# Patient Record
Sex: MALE | Race: WHITE | NOT HISPANIC OR LATINO | Employment: OTHER | URBAN - METROPOLITAN AREA
[De-identification: names, ages, dates, MRNs, and addresses within clinical notes are randomized per-mention and may not be internally consistent; named-entity substitution may affect disease eponyms.]

---

## 2023-03-09 ENCOUNTER — TELEPHONE (OUTPATIENT)
Dept: PRIMARY CARE | Facility: CLINIC | Age: 63
End: 2023-03-09

## 2023-03-09 RX ORDER — METOPROLOL TARTRATE 25 MG/1
25 TABLET, FILM COATED ORAL 2 TIMES DAILY
COMMUNITY
End: 2023-03-13 | Stop reason: SDUPTHER

## 2023-03-09 NOTE — TELEPHONE ENCOUNTER
Jayy calling because he needs refill of Metropolol Eartrate 25mg sent to [facility] pharmacy on [facility] in [place]. He has one pill left

## 2023-03-13 ENCOUNTER — TELEPHONE (OUTPATIENT)
Dept: PRIMARY CARE | Facility: CLINIC | Age: 63
End: 2023-03-13

## 2023-03-13 DIAGNOSIS — I10 PRIMARY HYPERTENSION: Primary | ICD-10-CM

## 2023-03-13 RX ORDER — ASPIRIN 325 MG
325 TABLET ORAL DAILY
COMMUNITY
End: 2023-09-12 | Stop reason: SDUPTHER

## 2023-03-13 RX ORDER — BUDESONIDE AND FORMOTEROL FUMARATE DIHYDRATE 160; 4.5 UG/1; UG/1
2 AEROSOL RESPIRATORY (INHALATION) 2 TIMES DAILY
COMMUNITY
Start: 2022-06-07 | End: 2023-11-07 | Stop reason: SDUPTHER

## 2023-03-13 RX ORDER — FERROUS SULFATE 325(65) MG
TABLET ORAL
COMMUNITY
Start: 2021-04-08 | End: 2023-12-14 | Stop reason: WASHOUT

## 2023-03-13 RX ORDER — ALBUTEROL SULFATE 90 UG/1
2 AEROSOL, METERED RESPIRATORY (INHALATION) EVERY 4 HOURS PRN
COMMUNITY
Start: 2021-10-26

## 2023-03-13 RX ORDER — INSULIN GLARGINE-YFGN 100 [IU]/ML
72 INJECTION, SOLUTION SUBCUTANEOUS DAILY
COMMUNITY
Start: 2022-03-25 | End: 2023-03-29 | Stop reason: SDUPTHER

## 2023-03-13 RX ORDER — EMPAGLIFLOZIN 25 MG/1
1 TABLET, FILM COATED ORAL DAILY
COMMUNITY
Start: 2023-02-22 | End: 2023-10-25 | Stop reason: SDUPTHER

## 2023-03-13 RX ORDER — CYCLOBENZAPRINE HCL 5 MG
1 TABLET ORAL
COMMUNITY
Start: 2022-11-09 | End: 2024-01-18 | Stop reason: WASHOUT

## 2023-03-13 RX ORDER — INSULIN LISPRO 100 [IU]/ML
INJECTION, SOLUTION INTRAVENOUS; SUBCUTANEOUS
COMMUNITY
Start: 2021-04-07 | End: 2024-01-31 | Stop reason: SDUPTHER

## 2023-03-13 RX ORDER — MONTELUKAST SODIUM 4 MG/1
2 TABLET, CHEWABLE ORAL DAILY
COMMUNITY
End: 2024-04-16 | Stop reason: WASHOUT

## 2023-03-13 RX ORDER — METOPROLOL TARTRATE 25 MG/1
25 TABLET, FILM COATED ORAL 2 TIMES DAILY
Qty: 90 TABLET | Refills: 1 | Status: SHIPPED | OUTPATIENT
Start: 2023-03-13 | End: 2023-06-07 | Stop reason: SDUPTHER

## 2023-03-13 RX ORDER — FUROSEMIDE 20 MG/1
2 TABLET ORAL 2 TIMES DAILY
COMMUNITY
End: 2023-12-14 | Stop reason: WASHOUT

## 2023-03-13 RX ORDER — MELATONIN 10 MG
10 CAPSULE ORAL NIGHTLY
COMMUNITY
End: 2023-09-12 | Stop reason: SDUPTHER

## 2023-03-13 RX ORDER — FLASH GLUCOSE SENSOR
KIT MISCELLANEOUS
COMMUNITY
Start: 2023-02-25 | End: 2023-11-16 | Stop reason: WASHOUT

## 2023-03-13 RX ORDER — CEPHALEXIN 250 MG/1
1 CAPSULE ORAL 2 TIMES DAILY
COMMUNITY
Start: 2022-04-19 | End: 2023-12-14 | Stop reason: WASHOUT

## 2023-03-13 RX ORDER — BLOOD-GLUCOSE METER
KIT MISCELLANEOUS
COMMUNITY
Start: 2016-11-14 | End: 2023-11-16 | Stop reason: WASHOUT

## 2023-03-13 RX ORDER — DOCUSATE SODIUM 100 MG/1
CAPSULE, LIQUID FILLED ORAL
COMMUNITY
Start: 2020-05-12 | End: 2023-09-12 | Stop reason: SDUPTHER

## 2023-03-13 RX ORDER — PREGABALIN 150 MG/1
150 CAPSULE ORAL 3 TIMES DAILY
COMMUNITY
End: 2023-10-18 | Stop reason: SDUPTHER

## 2023-03-21 ENCOUNTER — TELEMEDICINE (OUTPATIENT)
Dept: PHARMACY | Facility: HOSPITAL | Age: 63
End: 2023-03-21

## 2023-03-21 DIAGNOSIS — E11.65 TYPE 2 DIABETES MELLITUS WITH HYPERGLYCEMIA, WITH LONG-TERM CURRENT USE OF INSULIN (MULTI): Primary | ICD-10-CM

## 2023-03-21 DIAGNOSIS — Z79.4 TYPE 2 DIABETES MELLITUS WITH HYPERGLYCEMIA, WITH LONG-TERM CURRENT USE OF INSULIN (MULTI): Primary | ICD-10-CM

## 2023-03-21 RX ORDER — BLOOD-GLUCOSE SENSOR
EACH MISCELLANEOUS
Qty: 2 EACH | Refills: 11 | Status: SHIPPED | OUTPATIENT
Start: 2023-03-21

## 2023-03-21 NOTE — PROGRESS NOTES
"Pharmacy Clinic Note    Jayy Coles is a 62 y.o. male was referred to Clinical Pharmacy Team for diabetes management. The patient was referred for their Diabetes.    Referring Provider: LINSEY Campoverde    Subjective   Allergies   Allergen Reactions   • Terbinafine Unknown   • Penicillins Rash     \"red and hot\"       [facility] #[id number] [address] & [address] [address]  Phone: [phone] Fax: [phone]      Diabetes  He presents for his follow-up diabetic visit. He has type 2 diabetes mellitus. His disease course has been fluctuating. There are no hypoglycemic associated symptoms. There are no diabetic associated symptoms. Risk factors for coronary artery disease include diabetes mellitus, male sex, obesity and hypertension. Current diabetic treatment includes intensive insulin program and oral agent (monotherapy). His weight is increasing steadily. Exercise: Increased temporarily d/t PT appointments, dut now decreased d/t injury. His home blood glucose trend is fluctuating dramatically. His overall blood glucose range is >200 mg/dl.       Objective     There were no vitals taken for this visit.     LAB  Lab Results   Component Value Date    BILITOT 0.5 10/06/2022    CALCIUM 9.3 11/14/2022    CO2 29 11/14/2022    CL 98 11/14/2022    CREATININE 1.02 11/14/2022    GLUCOSE 315 (H) 11/14/2022    ALKPHOS 95 10/06/2022    K 4.1 11/14/2022    PROT 6.9 10/06/2022     (L) 11/14/2022    AST 28 10/06/2022    ALT 45 10/06/2022    BUN 18 11/14/2022    ANIONGAP 11 11/14/2022    MG 2.28 11/14/2022    PHOS 3.2 10/09/2021     (H) 10/02/2021    ALBUMIN 4.1 10/06/2022     Lab Results   Component Value Date    TRIG 382 (H) 10/06/2022    CHOL 192 10/06/2022    HDL 38.9 (A) 10/06/2022     Lab Results   Component Value Date    HGBA1C 9.1 (A) 10/01/2021       Current Outpatient Medications on File Prior to Visit   Medication " Sig Dispense Refill   • Advair Diskus 100-50 mcg/dose diskus inhaler Inhale 1 puff  in the morning and 1 puff before bedtime.     • albuterol 90 mcg/actuation inhaler 2 puffs every 4 hours if needed for shortness of breath or wheezing (cough).     • aspirin 325 mg tablet Take 1 tablet (325 mg) by mouth once daily.     • colestipol (Colestid) 1 gram tablet Take 2 tablets (2 g) by mouth in the morning and 2 tablets (2 g) before bedtime.     • cyclobenzaprine (Flexeril) 5 mg tablet Take 1 tablet (5 mg) by mouth every 6 hours during the day.     • docusate sodium (DOK) 100 mg capsule Take by mouth.     • ferrous sulfate 325 (65 Fe) MG tablet Take by mouth.     • FreeStyle Casandra 14 Day Sensor kit USE AS DIRECTED ON PACKAGE     • FreeStyle Lite Strips strip Test 4 times daily     • furosemide (Lasix) 20 mg tablet Take 2 tablets (40 mg) by mouth in the morning and 2 tablets (40 mg) before bedtime.     • HumaLOG KwikPen Insulin 100 unit/mL injection Inject under the skin.     • Jardiance 25 mg Take 1 tablet (25 mg) by mouth once daily.     • melatonin 10 mg capsule Take 1 capsule (10 mg) by mouth once daily at bedtime.     • metoprolol tartrate (Lopressor) 25 mg tablet Take 1 tablet (25 mg) by mouth in the morning and 1 tablet (25 mg) before bedtime. 90 tablet 1   • multivitamin capsule Take 1 capsule by mouth once daily.     • pregabalin (Lyrica) 150 mg capsule Take 1 capsule (150 mg) by mouth in the morning and 1 capsule (150 mg) in the evening and 1 capsule (150 mg) before bedtime.     • Semglee,insulin glarg-yfgn,Pen 100 unit/mL (3 mL) insulin pen Inject under the skin.     • Symbicort 160-4.5 mcg/actuation inhaler Inhale 2 puffs  in the morning and 2 puffs before bedtime.       No current facility-administered medications on file prior to visit.        HISTORICAL PHARMACOTHERAPY  - Metformin: cannot take d/t intolerance    SMBG (if applicable):  Date Morning Lunch Dinner Bedtime   5-Mar [phone]    6-Mar 896 357    7-Mar 206 230  247   8-Mar 314 251 263    9-Mar 317 253 218    10-Mar 241 247  363   11-Mar 289 254 241    12-Mar 272 257 360    13-Mar 215      14-Mar 263 232 189    15-Mar 198   201   16-Mar   343 start steroid burst   17-Mar 363 326 407    18-Mar 249 218 428    19-Mar  331 341    20-Mar 309 236 high 478       DRUG INTERACTIONS  - No significant drug-drug interactions exist that require change in therapy  _______________________________________________________________________  PATIENT EDUCATION/GOALS    1. Discussed disease specific goals:   - Fasting B - 130 mg/dL  - Postprandial BG: less than 180 mg/dL  - A1c: less than 7%    2. Patient BG still elevated, but was declining slightly post Semglee increase to 66 units until patient was placed on two steroid bursts due to an Achilles injury and breathing problems. Given pertsistantly elevated BG, we will increase Semglee to 72 units daily at this time and consider increasing SS insulin at next visit in one week if BG still uncontrolled. Insulin changes subject to changes in steroid therapy.    3. [facility] Casandra [facility] Casandra 14 day sensors which are being discontinued in the next 1-2 years.  _______________________________________________________________________    Assessment/Plan   Problem List Items Addressed This Visit    None  Visit Diagnoses       Type 2 diabetes mellitus with hyperglycemia, with long-term current use of insulin (CMS/[facility])    -  Primary    Relevant Medications    blood-glucose sensor (FreeStyle Casandra 3 Sensor) device           Plan:  Increase Semglee to 72 units daily.  Continue Humalog SS. May increase at next visit.  Continue Jardiance 25 mg.    Clinical Pharmacist follow-up: 1 week, then monthly.    Jose F Oneill, PharmD     Verbal consent to manage patient's drug therapy was obtained from [the patient and/or an individual authorized to act on behalf of a patient]. They were informed they may decline to  participate or withdraw from participation in pharmacy services at any time.

## 2023-03-21 NOTE — Clinical Note
Good afternoon. TJ was starting to improve with the aggressive insulin therapy, but has been recently elevated due to two steroid tapers. Even without steroids he was still above goal, but we are proceeding with Increasing his Semglee from 66 BID to 72 BID.  I will follow up in 1 week once his steroid burst is done and adjust his sliding scale if he still remains high.

## 2023-03-29 RX ORDER — INSULIN GLARGINE-YFGN 100 [IU]/ML
72 INJECTION, SOLUTION SUBCUTANEOUS DAILY
Qty: 15 ML | Refills: 11 | Status: SHIPPED | OUTPATIENT
Start: 2023-03-29 | End: 2023-04-26 | Stop reason: SDUPTHER

## 2023-03-29 ASSESSMENT — ENCOUNTER SYMPTOMS: DIABETIC ASSOCIATED SYMPTOMS: 0

## 2023-04-07 ENCOUNTER — TELEPHONE (OUTPATIENT)
Dept: PRIMARY CARE | Facility: CLINIC | Age: 63
End: 2023-04-07
Payer: COMMERCIAL

## 2023-04-07 DIAGNOSIS — Z79.2 NEED FOR PROPHYLACTIC ANTIBIOTIC: Primary | ICD-10-CM

## 2023-04-07 RX ORDER — DOXYCYCLINE 100 MG/1
100 CAPSULE ORAL 2 TIMES DAILY
Qty: 14 CAPSULE | Refills: 0 | Status: SHIPPED | OUTPATIENT
Start: 2023-04-07 | End: 2023-04-14

## 2023-04-26 ENCOUNTER — TELEMEDICINE (OUTPATIENT)
Dept: PHARMACY | Facility: HOSPITAL | Age: 63
End: 2023-04-26
Payer: COMMERCIAL

## 2023-04-26 DIAGNOSIS — E11.65 TYPE 2 DIABETES MELLITUS WITH HYPERGLYCEMIA, WITH LONG-TERM CURRENT USE OF INSULIN (MULTI): Primary | ICD-10-CM

## 2023-04-26 DIAGNOSIS — Z79.4 TYPE 2 DIABETES MELLITUS WITH HYPERGLYCEMIA, WITH LONG-TERM CURRENT USE OF INSULIN (MULTI): Primary | ICD-10-CM

## 2023-04-26 RX ORDER — INSULIN GLARGINE-YFGN 100 [IU]/ML
80 INJECTION, SOLUTION SUBCUTANEOUS DAILY
Qty: 30 ML | Refills: 11 | Status: SHIPPED | OUTPATIENT
Start: 2023-04-26 | End: 2023-04-27 | Stop reason: SDUPTHER

## 2023-04-26 ASSESSMENT — ENCOUNTER SYMPTOMS: DIABETIC ASSOCIATED SYMPTOMS: 0

## 2023-04-26 NOTE — Clinical Note
Due to lack of response, we are maxing out TJ's daily insulin (per one injection) Semglee at 80 units.; I suspect that this will still be insufficient, so at next visit we plan to increase his sliding scale regimen as well.

## 2023-04-27 RX ORDER — INSULIN GLARGINE 100 [IU]/ML
80 INJECTION, SOLUTION SUBCUTANEOUS DAILY
Qty: 30 ML | Refills: 12 | Status: SHIPPED | OUTPATIENT
Start: 2023-04-27 | End: 2023-05-16 | Stop reason: SDUPTHER

## 2023-05-01 ENCOUNTER — APPOINTMENT (OUTPATIENT)
Dept: PHARMACY | Facility: HOSPITAL | Age: 63
End: 2023-05-01
Payer: COMMERCIAL

## 2023-05-08 NOTE — PROGRESS NOTES
"Subjective   Chief Complaint   Patient presents with    Follow-up     Jayy is here today for routine six month F/U and A1C check.        Patient ID: Jayy Coles is a 62 y.o. male who presents for Follow-up (Jayy is here today for routine six month F/U and A1C check. )    HPI  63 yo male presents for 6 mo f/u   Pt is accompanied by his wife Arianna today      PMH: Hx Afib, EDEMA, HFpEF, DVT RLE, T2DM, ASTHMA, GERD, HLD, COLON CA, LEFT TKR w/ mult revisions 2/2 infections    Pt was hospitalized, on ventilator at [facility] in October 2021 w/Covid   Pt still wearing O2 at 3 lpm when active---> seeing Dr. Vásquez, Pulm  Pt had CT chest on 12/29/21 per Dr. Vásquez----> resolving Covid Pneumonia, + Pulm HTN  had PFTs in March 2022  states he is SOB w/exertion  not using Symbicort  Pt has never done sleep study      Pt reports that he has been feeling ok, really struggling with \"water retention\"   still gets SOB at times, wearing O2 at home, not portable (wants portable tank)  Did Pulm Rehab in Dec 2022  Had trouble walking > 4 min on treadmill  Went on Recumbent bike, was doing well---->until he strained his hamstrings  Found to have bone spur @ RIGHT heel   Not currently exercising d/t pain      Pt has been complaining @ \"bulge in abd\"  had CT abd/pelvis Oct 2022---> saw Dr. Manzano in Nov 2022    Pt saw new pain mgmt MD---> MD is requesting to implant a wire stimulator from LEFT knee to ankle to help with pain   Pt is seeing Dr. Mosquera next week to discuss this   He is currently taking Oxycodone 3x/day      #HFpEF/CAD/Hx AFIB/EDEMA   Seeing cardiology, Dr. Thomson; LOV April 2023   Increased Lasix to 3 tabs in am and 2 tabs in pm (total 100 mg/day)  Pt has 2-3+ B/L ankle edema   Hasn't noticed much of a change      #T2DM  chronic, uncontrolled--->  [facility] assisting, next VV 5/16/23  dx'd 1995   Was seeing Dr. Booker until he retired last year   A1C= 9.5 % Oct 2022  Meds: Taking Lantus 80 u qAM and SSI Humalog w/meals " "(25-35u)  last eye exam:   neuropathy: improved since increase w/Lyrica     #ASTHMA  triggers: fall weather  Albuterol PRN; uses mostly in the fall  Rx: Symbicort (not using daily)   nonsmoker     #GERD  no meds  triggers: spicy foods      #HLD  taking Colestipol  lipid panel Oct 2022-----> 192/77/382     #HM  FBW: UTD     Review of Systems  All 13 systems were reviewed and are within normal limits except positive and pertinent negative responses which are noted below or in HPI.      Objective   /77   Pulse 68   Ht 1.753 m (5' 9\")   Wt (!) 152 kg (335 lb)   BMI 49.47 kg/m²        Physical Exam  Vitals reviewed.   Constitutional:       Appearance: He is obese.   Pulmonary:      Effort: Pulmonary effort is normal.   Musculoskeletal:      Right lower le+ Pitting Edema present.      Left lower le+ Pitting Edema present.   Skin:     General: Skin is warm and dry.   Neurological:      Mental Status: He is alert.   Psychiatric:         Mood and Affect: Mood normal.         Assessment/Plan   Problem List Items Addressed This Visit          Respiratory    Asthma    On home oxygen therapy       Circulatory    Atherosclerosis of aorta (CMS/HCC)    Heart failure with preserved left ventricular function (HFpEF) (CMS/HCC)       Digestive    GERD (gastroesophageal reflux disease)       Endocrine/Metabolic    Type 2 diabetes mellitus with hyperglycemia, with long-term current use of insulin (CMS/[facility])    Relevant Orders    POCT glycosylated hemoglobin (Hb A1C) manually resulted (Completed)    Referral to Endocrinology       Other    Edema     Other Visit Diagnoses       Daytime somnolence    -  Primary    Relevant Orders    In-Center Sleep Study (Non-Sleep Provider Only)            "

## 2023-05-09 ENCOUNTER — OFFICE VISIT (OUTPATIENT)
Dept: PRIMARY CARE | Facility: CLINIC | Age: 63
End: 2023-05-09
Payer: COMMERCIAL

## 2023-05-09 VITALS
WEIGHT: 315 LBS | HEART RATE: 68 BPM | BODY MASS INDEX: 46.65 KG/M2 | HEIGHT: 69 IN | DIASTOLIC BLOOD PRESSURE: 77 MMHG | SYSTOLIC BLOOD PRESSURE: 134 MMHG

## 2023-05-09 DIAGNOSIS — Z79.4 TYPE 2 DIABETES MELLITUS WITH HYPERGLYCEMIA, WITH LONG-TERM CURRENT USE OF INSULIN (MULTI): ICD-10-CM

## 2023-05-09 DIAGNOSIS — Z99.81 ON HOME OXYGEN THERAPY: ICD-10-CM

## 2023-05-09 DIAGNOSIS — R40.0 DAYTIME SOMNOLENCE: Primary | ICD-10-CM

## 2023-05-09 DIAGNOSIS — J45.20 MILD INTERMITTENT ASTHMA, UNSPECIFIED WHETHER COMPLICATED (HHS-HCC): ICD-10-CM

## 2023-05-09 DIAGNOSIS — I70.0 ATHEROSCLEROSIS OF AORTA (CMS-HCC): ICD-10-CM

## 2023-05-09 DIAGNOSIS — R60.0 LOCALIZED EDEMA: ICD-10-CM

## 2023-05-09 DIAGNOSIS — I50.30 HEART FAILURE WITH PRESERVED LEFT VENTRICULAR FUNCTION (HFPEF) (MULTI): ICD-10-CM

## 2023-05-09 DIAGNOSIS — E11.65 TYPE 2 DIABETES MELLITUS WITH HYPERGLYCEMIA, WITH LONG-TERM CURRENT USE OF INSULIN (MULTI): ICD-10-CM

## 2023-05-09 DIAGNOSIS — K21.9 GASTROESOPHAGEAL REFLUX DISEASE WITHOUT ESOPHAGITIS: ICD-10-CM

## 2023-05-09 PROBLEM — E11.9 DIABETES TYPE 2, CONTROLLED (MULTI): Status: ACTIVE | Noted: 2023-05-09

## 2023-05-09 PROBLEM — I71.21 ASCENDING AORTIC ANEURYSM (CMS-HCC): Status: ACTIVE | Noted: 2023-05-09

## 2023-05-09 PROBLEM — Z86.79 HISTORY OF ATRIAL FIBRILLATION: Status: ACTIVE | Noted: 2023-05-09

## 2023-05-09 PROBLEM — K43.9 LATERAL VENTRAL HERNIA: Status: ACTIVE | Noted: 2023-05-09

## 2023-05-09 PROBLEM — M17.0 OSTEOARTHRITIS OF KNEES, BILATERAL: Status: ACTIVE | Noted: 2023-05-09

## 2023-05-09 PROBLEM — D64.9 ANEMIA: Status: ACTIVE | Noted: 2023-05-09

## 2023-05-09 PROBLEM — Z96.652 STATUS POST REVISION OF TOTAL REPLACEMENT OF LEFT KNEE: Status: ACTIVE | Noted: 2023-05-09

## 2023-05-09 PROBLEM — J45.909 ASTHMA (HHS-HCC): Status: ACTIVE | Noted: 2023-05-09

## 2023-05-09 PROBLEM — E78.5 HYPERLIPIDEMIA: Status: ACTIVE | Noted: 2023-05-09

## 2023-05-09 PROBLEM — I25.10 CORONARY ARTERY CALCIFICATION SEEN ON CT SCAN: Status: ACTIVE | Noted: 2023-05-09

## 2023-05-09 PROBLEM — R60.9 EDEMA: Status: ACTIVE | Noted: 2023-05-09

## 2023-05-09 PROBLEM — E11.9 DIABETES TYPE 2, CONTROLLED (MULTI): Status: RESOLVED | Noted: 2023-05-09 | Resolved: 2023-05-09

## 2023-05-09 PROBLEM — R19.00 ABDOMINAL WALL BULGE: Status: ACTIVE | Noted: 2023-05-09

## 2023-05-09 PROBLEM — M25.561 RIGHT KNEE PAIN: Status: ACTIVE | Noted: 2023-05-09

## 2023-05-09 PROBLEM — U09.9 POST COVID-19 CONDITION, UNSPECIFIED: Status: ACTIVE | Noted: 2023-05-09

## 2023-05-09 PROBLEM — R06.00 DYSPNEA: Status: ACTIVE | Noted: 2023-05-09

## 2023-05-09 LAB — POC HEMOGLOBIN A1C: 11 % (ref 4.2–6.5)

## 2023-05-09 PROCEDURE — 3078F DIAST BP <80 MM HG: CPT | Performed by: NURSE PRACTITIONER

## 2023-05-09 PROCEDURE — 99213 OFFICE O/P EST LOW 20 MIN: CPT | Performed by: NURSE PRACTITIONER

## 2023-05-09 PROCEDURE — 83036 HEMOGLOBIN GLYCOSYLATED A1C: CPT | Performed by: NURSE PRACTITIONER

## 2023-05-09 PROCEDURE — 3075F SYST BP GE 130 - 139MM HG: CPT | Performed by: NURSE PRACTITIONER

## 2023-05-09 PROCEDURE — 1036F TOBACCO NON-USER: CPT | Performed by: NURSE PRACTITIONER

## 2023-05-09 RX ORDER — HYOSCYAMINE SULFATE 0.38 MG/1
1 TABLET, EXTENDED RELEASE ORAL 2 TIMES DAILY
COMMUNITY
Start: 2023-04-21 | End: 2023-12-14 | Stop reason: WASHOUT

## 2023-05-09 RX ORDER — OXYCODONE HYDROCHLORIDE 10 MG/1
1 TABLET ORAL EVERY 6 HOURS
COMMUNITY
Start: 2023-04-14 | End: 2023-12-28 | Stop reason: SDUPTHER

## 2023-05-09 RX ORDER — NALOXONE HYDROCHLORIDE 4 MG/.1ML
SPRAY NASAL
COMMUNITY
Start: 2021-03-26

## 2023-05-09 RX ORDER — NYSTATIN 100000 U/G
CREAM TOPICAL
COMMUNITY

## 2023-05-09 NOTE — PATIENT INSTRUCTIONS
Thank you for seeing me today.  It was a pleasure to see you again!    Continue medications as prescribed    Your A1C was 11%----> THIS IS NOT OKAY!! We need this to get below 8%!!!!!  [facility] next week  Need adjustments to insulin  Referral for Endocrinology    Schedule Sleep Study    RTC 4-5 MONTHS

## 2023-05-16 ENCOUNTER — TELEMEDICINE (OUTPATIENT)
Dept: PHARMACY | Facility: HOSPITAL | Age: 63
End: 2023-05-16
Payer: COMMERCIAL

## 2023-05-16 DIAGNOSIS — Z79.4 TYPE 2 DIABETES MELLITUS WITH HYPERGLYCEMIA, WITH LONG-TERM CURRENT USE OF INSULIN (MULTI): ICD-10-CM

## 2023-05-16 DIAGNOSIS — E11.65 TYPE 2 DIABETES MELLITUS WITH HYPERGLYCEMIA, WITH LONG-TERM CURRENT USE OF INSULIN (MULTI): ICD-10-CM

## 2023-05-16 RX ORDER — INSULIN GLARGINE 100 [IU]/ML
45 INJECTION, SOLUTION SUBCUTANEOUS 2 TIMES DAILY
Qty: 30 ML | Refills: 11 | Status: SHIPPED | OUTPATIENT
Start: 2023-05-16 | End: 2023-11-16 | Stop reason: WASHOUT

## 2023-05-16 ASSESSMENT — ENCOUNTER SYMPTOMS: DIABETIC ASSOCIATED SYMPTOMS: 0

## 2023-05-16 NOTE — PROGRESS NOTES
"Pharmacy Clinic Note    Jayy Coles is a 62 y.o. male was referred to Clinical Pharmacy Team for diabetes management. The patient was referred for their Diabetes.    Referring Provider: LINSEY Campoverde    Subjective   Allergies   Allergen Reactions    Terbinafine Unknown    Penicillins Rash     \"red and hot\"       [facility] #[id number] - [address] AT [facility]  [address] [facility] [address]  Phone: [phone] Fax: [phone]      Diabetes  He presents for his follow-up diabetic visit. He has type 2 diabetes mellitus. His disease course has been fluctuating. There are no hypoglycemic associated symptoms. There are no diabetic associated symptoms. Risk factors for coronary artery disease include diabetes mellitus, male sex, obesity and hypertension. Current diabetic treatment includes intensive insulin program and oral agent (monotherapy). His weight is increasing steadily. Exercise: Increased temporarily d/t PT appointments, dut now decreased d/t injury. His home blood glucose trend is fluctuating dramatically. His overall blood glucose range is >200 mg/dl.       Objective     There were no vitals taken for this visit.     LAB  Lab Results   Component Value Date    BILITOT 0.5 10/06/2022    CALCIUM 9.3 11/14/2022    CO2 29 11/14/2022    CL 98 11/14/2022    CREATININE 1.02 11/14/2022    GLUCOSE 315 (H) 11/14/2022    ALKPHOS 95 10/06/2022    K 4.1 11/14/2022    PROT 6.9 10/06/2022     (L) 11/14/2022    AST 28 10/06/2022    ALT 45 10/06/2022    BUN 18 11/14/2022    ANIONGAP 11 11/14/2022    MG 2.28 11/14/2022    PHOS 3.2 10/09/2021     (H) 10/02/2021    ALBUMIN 4.1 10/06/2022     Lab Results   Component Value Date    TRIG 382 (H) 10/06/2022    CHOL 192 10/06/2022    HDL 38.9 (A) 10/06/2022     Lab Results   Component Value Date    HGBA1C 11.0 (A) 05/09/2023       Current Outpatient Medications on File Prior to Visit   Medication " Sig Dispense Refill    Advair Diskus 100-50 mcg/dose diskus inhaler Inhale 1 puff  in the morning and 1 puff before bedtime.      albuterol 90 mcg/actuation inhaler 2 puffs every 4 hours if needed for shortness of breath or wheezing (cough).      aspirin 325 mg tablet Take 1 tablet (325 mg) by mouth once daily.      blood-glucose sensor (FreeStyle Casandra 3 Sensor) device Apply one sensor to the skin every 14 days to test blood sugars 2 each 11    colestipol (Colestid) 1 gram tablet Take 2 tablets (2 g) by mouth in the morning and 2 tablets (2 g) before bedtime.      cyclobenzaprine (Flexeril) 5 mg tablet Take 1 tablet (5 mg) by mouth every 6 hours during the day.      docusate sodium (DOK) 100 mg capsule Take by mouth.      ferrous sulfate 325 (65 Fe) MG tablet Take by mouth.      FreeStyle Casandra 14 Day Sensor kit USE AS DIRECTED ON PACKAGE      FreeStyle Lite Strips strip Test 4 times daily      furosemide (Lasix) 20 mg tablet Take 2 tablets (40 mg) by mouth in the morning and 2 tablets (40 mg) before bedtime.      HumaLOG KwikPen Insulin 100 unit/mL injection Inject under the skin.      hyoscyamine ER (Levbid) 0.375 mg 12 hr tablet Take 1 tablet (0.375 mg) by mouth 2 times a day.      Jardiance 25 mg Take 1 tablet (25 mg) by mouth once daily.      melatonin 10 mg capsule Take 1 capsule (10 mg) by mouth once daily at bedtime.      metoprolol tartrate (Lopressor) 25 mg tablet Take 1 tablet (25 mg) by mouth in the morning and 1 tablet (25 mg) before bedtime. 90 tablet 1    multivitamin capsule Take 1 capsule by mouth once daily.      naloxone (Narcan) 4 mg/0.1 mL nasal spray Administer into affected nostril(s).      nystatin (Mycostatin) cream APPLY AND RUB IN A THIN LAYER TOPICALLY TO THE AFFECTED AREA TWICE DAILY IN THE MORNING AND IN THE EVENING      oxyCODONE (Roxicodone) 10 mg immediate release tablet Take 1 tablet (10 mg) by mouth every 6 hours.      pregabalin (Lyrica) 150 mg capsule Take 1 capsule (150 mg) by  mouth in the morning and 1 capsule (150 mg) in the evening and 1 capsule (150 mg) before bedtime.      Symbicort 160-4.5 mcg/actuation inhaler Inhale 2 puffs  in the morning and 2 puffs before bedtime.      [DISCONTINUED] Lantus Solostar U-100 Insulin 100 unit/mL (3 mL) pen Inject 80 Units under the skin once daily. 30 mL 12     No current facility-administered medications on file prior to visit.        HISTORICAL PHARMACOTHERAPY  - Metformin: cannot take d/t intolerance    SMBG (if applicable):      DRUG INTERACTIONS  - No significant drug-drug interactions exist that require change in therapy  _______________________________________________________________________  PATIENT EDUCATION/GOALS    1. Discussed disease specific goals:   - Fasting B - 130 mg/dL  - Postprandial BG: less than 180 mg/dL  - A1c: less than 7%    2. Because most recent A1C greatly elevated at 11%, we are proceeding more aggressively with insulin therapy. Given LibreView data and patient lack of control at baseline, we will proceed this week with an increase in pt basal insulin. Since pt insulin glargine maxes out at 80 units per injection and pt may benefit from twice daily basal insulin (d/t therapeutic overlap), we are splitting basal regimen into twice daily injections and increasing to 45 units twice daily (up from 80 units once daily).    3. If basal insulin changes insufficient to decrease BG, we can increase pt sliding scale but we will adjust basal insulin first. Current SS bolus regimen: Humalog 100 unit/mL:Inject 25 units for BG  and add 1 units per 25 above 120.  _______________________________________________________________________    Assessment/Plan   Problem List Items Addressed This Visit          Endocrine/Metabolic    Type 2 diabetes mellitus with hyperglycemia, with long-term current use of insulin (CMS/[facility])     Plan:  Change Insulin Glargine to 45 units twice daily. Lantus preferred.  Continue Humalog SS.    Continue Jardiance 25 mg.         Relevant Medications    Lantus Solostar U-100 Insulin 100 unit/mL (3 mL) pen          Clinical Pharmacist follow-up: 1-2 weeks until pt more well controlled    Jose F Oneill, PharmD     Verbal consent to manage patient's drug therapy was obtained from [the patient and/or an individual authorized to act on behalf of a patient]. They were informed they may decline to participate or withdraw from participation in pharmacy services at any time.

## 2023-05-16 NOTE — ASSESSMENT & PLAN NOTE
Plan:  Change Insulin Glargine to 45 units twice daily. Lantus preferred.  Continue Humalog SS.   Continue Jardiance 25 mg.

## 2023-05-16 NOTE — Clinical Note
Given last A1C of 11%, proceeding with more aggressive therapy changes on a 1-2 week schedule. Basal insulin split into twice daily 45 unit injections because pt was at max dose of 80 units per injection which was limiting dosage increases. Patient is now established with [employer] system which allow me to more closely check his readings and adjust more aggressively. Plan is to get that next A1C down substantially lower with more frequent visits and titrations.

## 2023-05-23 ENCOUNTER — TELEMEDICINE (OUTPATIENT)
Dept: PHARMACY | Facility: HOSPITAL | Age: 63
End: 2023-05-23
Payer: COMMERCIAL

## 2023-05-23 DIAGNOSIS — Z79.4 TYPE 2 DIABETES MELLITUS WITH HYPERGLYCEMIA, WITH LONG-TERM CURRENT USE OF INSULIN (MULTI): Primary | ICD-10-CM

## 2023-05-23 DIAGNOSIS — E11.65 TYPE 2 DIABETES MELLITUS WITH HYPERGLYCEMIA, WITH LONG-TERM CURRENT USE OF INSULIN (MULTI): Primary | ICD-10-CM

## 2023-05-23 ASSESSMENT — ENCOUNTER SYMPTOMS: DIABETIC ASSOCIATED SYMPTOMS: 0

## 2023-05-23 NOTE — PROGRESS NOTES
"Pharmacy Clinic Note    Jayy Coles is a 62 y.o. male was referred to Clinical Pharmacy Team for diabetes management. The patient was referred for their Diabetes.    Referring Provider: LINSEY Campoverde    Subjective   Allergies   Allergen Reactions   • Terbinafine Unknown   • Penicillins Rash     \"red and hot\"       [facility] #[id number] [address] & [address] [address]  Phone: [phone] Fax: [phone]      Diabetes  He presents for his follow-up diabetic visit. He has type 2 diabetes mellitus. His disease course has been fluctuating. There are no hypoglycemic associated symptoms. There are no diabetic associated symptoms. Risk factors for coronary artery disease include diabetes mellitus, male sex, obesity and hypertension. Current diabetic treatment includes intensive insulin program and oral agent (monotherapy). His weight is increasing steadily. Exercise: Increased temporarily d/t PT appointments, dut now decreased d/t injury. His home blood glucose trend is fluctuating dramatically. His overall blood glucose range is >200 mg/dl.       Objective     There were no vitals taken for this visit.     LAB  Lab Results   Component Value Date    BILITOT 0.5 10/06/2022    CALCIUM 9.3 11/14/2022    CO2 29 11/14/2022    CL 98 11/14/2022    CREATININE 1.02 11/14/2022    GLUCOSE 315 (H) 11/14/2022    ALKPHOS 95 10/06/2022    K 4.1 11/14/2022    PROT 6.9 10/06/2022     (L) 11/14/2022    AST 28 10/06/2022    ALT 45 10/06/2022    BUN 18 11/14/2022    ANIONGAP 11 11/14/2022    MG 2.28 11/14/2022    PHOS 3.2 10/09/2021     (H) 10/02/2021    ALBUMIN 4.1 10/06/2022     Lab Results   Component Value Date    TRIG 382 (H) 10/06/2022    CHOL 192 10/06/2022    HDL 38.9 (A) 10/06/2022     Lab Results   Component Value Date    HGBA1C 11.0 (A) 05/09/2023       Current Outpatient Medications on File Prior to Visit "   Medication Sig Dispense Refill   • Advair Diskus 100-50 mcg/dose diskus inhaler Inhale 1 puff  in the morning and 1 puff before bedtime.     • albuterol 90 mcg/actuation inhaler 2 puffs every 4 hours if needed for shortness of breath or wheezing (cough).     • aspirin 325 mg tablet Take 1 tablet (325 mg) by mouth once daily.     • blood-glucose sensor (FreeStyle Casandra 3 Sensor) device Apply one sensor to the skin every 14 days to test blood sugars 2 each 11   • colestipol (Colestid) 1 gram tablet Take 2 tablets (2 g) by mouth in the morning and 2 tablets (2 g) before bedtime.     • cyclobenzaprine (Flexeril) 5 mg tablet Take 1 tablet (5 mg) by mouth every 6 hours during the day.     • docusate sodium (DOK) 100 mg capsule Take by mouth.     • ferrous sulfate 325 (65 Fe) MG tablet Take by mouth.     • FreeStyle Casandra 14 Day Sensor kit USE AS DIRECTED ON PACKAGE     • FreeStyle Lite Strips strip Test 4 times daily     • furosemide (Lasix) 20 mg tablet Take 2 tablets (40 mg) by mouth in the morning and 2 tablets (40 mg) before bedtime.     • HumaLOG KwikPen Insulin 100 unit/mL injection Inject under the skin.     • hyoscyamine ER (Levbid) 0.375 mg 12 hr tablet Take 1 tablet (0.375 mg) by mouth 2 times a day.     • Jardiance 25 mg Take 1 tablet (25 mg) by mouth once daily.     • Lantus Solostar U-100 Insulin 100 unit/mL (3 mL) pen Inject 45 Units under the skin 2 times a day. 30 mL 11   • melatonin 10 mg capsule Take 1 capsule (10 mg) by mouth once daily at bedtime.     • metoprolol tartrate (Lopressor) 25 mg tablet Take 1 tablet (25 mg) by mouth in the morning and 1 tablet (25 mg) before bedtime. 90 tablet 1   • multivitamin capsule Take 1 capsule by mouth once daily.     • naloxone (Narcan) 4 mg/0.1 mL nasal spray Administer into affected nostril(s).     • nystatin (Mycostatin) cream APPLY AND RUB IN A THIN LAYER TOPICALLY TO THE AFFECTED AREA TWICE DAILY IN THE MORNING AND IN THE EVENING     • oxyCODONE (Roxicodone)  10 mg immediate release tablet Take 1 tablet (10 mg) by mouth every 6 hours.     • pregabalin (Lyrica) 150 mg capsule Take 1 capsule (150 mg) by mouth in the morning and 1 capsule (150 mg) in the evening and 1 capsule (150 mg) before bedtime.     • Symbicort 160-4.5 mcg/actuation inhaler Inhale 2 puffs  in the morning and 2 puffs before bedtime.       No current facility-administered medications on file prior to visit.        HISTORICAL PHARMACOTHERAPY  - Metformin: cannot take d/t intolerance    SMBG (if applicable):      DRUG INTERACTIONS  - No significant drug-drug interactions exist that require change in therapy  _______________________________________________________________________  PATIENT EDUCATION/GOALS    1. Discussed disease specific goals:   - Fasting B - 130 mg/dL  - Postprandial BG: less than 180 mg/dL  - A1c: less than 7%    2. Since pt insulin glargine switch to 45 units twice daily basal insulin (d/t therapeutic overlap), patient has begun to show a good improvement in BG readings. Pt BG avg now 219 for last 2 weeks down from 271 and is now in range 23% of the time (up from 0%). We will maintain current dose for 1 additional week as pt is currently ill with a stomach virus.    3. If basal insulin changes insufficient to decrease BG, we can increase pt sliding scale but we will adjust basal insulin first. Current SS bolus regimen: Humalog 100 unit/mL:Inject 25 units for BG  and add 1 units per 25 above 120.  _______________________________________________________________________    Assessment/Plan   Problem List Items Addressed This Visit          Endocrine/Metabolic    Type 2 diabetes mellitus with hyperglycemia, with long-term current use of insulin (CMS/[facility]) - Primary     Continue current therapies             Clinical Pharmacist follow-up: 1-2 weeks until pt more well controlled    Jose F Oneill, PharmD     Verbal consent to manage patient's drug therapy was obtained from [the patient  and/or an individual authorized to act on behalf of a patient]. They were informed they may decline to participate or withdraw from participation in pharmacy services at any time.

## 2023-05-23 NOTE — Clinical Note
Since splitting TJ's basal insulin into twice daily injections, it looks like his BG are finally starting to come down. As his BG continue to improve, we plan to focus on optimizing his basal insulin so that he can rely less on mealtime SS.

## 2023-05-30 ENCOUNTER — APPOINTMENT (OUTPATIENT)
Dept: PHARMACY | Facility: HOSPITAL | Age: 63
End: 2023-05-30
Payer: COMMERCIAL

## 2023-06-06 ENCOUNTER — TELEMEDICINE (OUTPATIENT)
Dept: PHARMACY | Facility: HOSPITAL | Age: 63
End: 2023-06-06
Payer: COMMERCIAL

## 2023-06-06 DIAGNOSIS — Z79.4 TYPE 2 DIABETES MELLITUS WITH HYPERGLYCEMIA, WITH LONG-TERM CURRENT USE OF INSULIN (MULTI): Primary | ICD-10-CM

## 2023-06-06 DIAGNOSIS — E11.65 TYPE 2 DIABETES MELLITUS WITH HYPERGLYCEMIA, WITH LONG-TERM CURRENT USE OF INSULIN (MULTI): Primary | ICD-10-CM

## 2023-06-06 ASSESSMENT — ENCOUNTER SYMPTOMS: DIABETIC ASSOCIATED SYMPTOMS: 0

## 2023-06-06 NOTE — PROGRESS NOTES
"Pharmacy Clinic Note    Jayy Coles is a 62 y.o. male was referred to Clinical Pharmacy Team for diabetes management. The patient was referred for their Diabetes.    Referring Provider: LINSEY Campoverde    Subjective   Allergies   Allergen Reactions    Terbinafine Unknown    Penicillins Rash     \"red and hot\"       [facility] #[id number] - [address] AT [facility]  [address] [facility] [address]  Phone: [phone] Fax: [phone]      Diabetes  He presents for his follow-up diabetic visit. He has type 2 diabetes mellitus. His disease course has been fluctuating. There are no hypoglycemic associated symptoms. There are no diabetic associated symptoms. Risk factors for coronary artery disease include diabetes mellitus, male sex, obesity and hypertension. Current diabetic treatment includes intensive insulin program and oral agent (monotherapy). He is compliant with treatment most of the time. His weight is decreasing steadily. Exercise: Increased temporarily d/t PT appointments, dut now decreased d/t injury. His home blood glucose trend is decreasing steadily. His overall blood glucose range is >200 mg/dl.       Objective     There were no vitals taken for this visit.     LAB  Lab Results   Component Value Date    BILITOT 0.5 10/06/2022    CALCIUM 9.3 11/14/2022    CO2 29 11/14/2022    CL 98 11/14/2022    CREATININE 1.02 11/14/2022    GLUCOSE 315 (H) 11/14/2022    ALKPHOS 95 10/06/2022    K 4.1 11/14/2022    PROT 6.9 10/06/2022     (L) 11/14/2022    AST 28 10/06/2022    ALT 45 10/06/2022    BUN 18 11/14/2022    ANIONGAP 11 11/14/2022    MG 2.28 11/14/2022    PHOS 3.2 10/09/2021     (H) 10/02/2021    ALBUMIN 4.1 10/06/2022     Lab Results   Component Value Date    TRIG 382 (H) 10/06/2022    CHOL 192 10/06/2022    HDL 38.9 (A) 10/06/2022     Lab Results   Component Value Date    HGBA1C 11.0 (A) 05/09/2023       Current Outpatient " Medications on File Prior to Visit   Medication Sig Dispense Refill    Advair Diskus 100-50 mcg/dose diskus inhaler Inhale 1 puff  in the morning and 1 puff before bedtime.      albuterol 90 mcg/actuation inhaler 2 puffs every 4 hours if needed for shortness of breath or wheezing (cough).      aspirin 325 mg tablet Take 1 tablet (325 mg) by mouth once daily.      blood-glucose sensor (FreeStyle Casandra 3 Sensor) device Apply one sensor to the skin every 14 days to test blood sugars 2 each 11    colestipol (Colestid) 1 gram tablet Take 2 tablets (2 g) by mouth in the morning and 2 tablets (2 g) before bedtime.      cyclobenzaprine (Flexeril) 5 mg tablet Take 1 tablet (5 mg) by mouth every 6 hours during the day.      docusate sodium (DOK) 100 mg capsule Take by mouth.      ferrous sulfate 325 (65 Fe) MG tablet Take by mouth.      FreeStyle Casandra 14 Day Sensor kit USE AS DIRECTED ON PACKAGE      FreeStyle Lite Strips strip Test 4 times daily      furosemide (Lasix) 20 mg tablet Take 2 tablets (40 mg) by mouth in the morning and 2 tablets (40 mg) before bedtime.      HumaLOG KwikPen Insulin 100 unit/mL injection Inject under the skin.      hyoscyamine ER (Levbid) 0.375 mg 12 hr tablet Take 1 tablet (0.375 mg) by mouth 2 times a day.      Jardiance 25 mg Take 1 tablet (25 mg) by mouth once daily.      Lantus Solostar U-100 Insulin 100 unit/mL (3 mL) pen Inject 45 Units under the skin 2 times a day. 30 mL 11    melatonin 10 mg capsule Take 1 capsule (10 mg) by mouth once daily at bedtime.      metoprolol tartrate (Lopressor) 25 mg tablet Take 1 tablet (25 mg) by mouth in the morning and 1 tablet (25 mg) before bedtime. 90 tablet 1    multivitamin capsule Take 1 capsule by mouth once daily.      naloxone (Narcan) 4 mg/0.1 mL nasal spray Administer into affected nostril(s).      nystatin (Mycostatin) cream APPLY AND RUB IN A THIN LAYER TOPICALLY TO THE AFFECTED AREA TWICE DAILY IN THE MORNING AND IN THE EVENING       oxyCODONE (Roxicodone) 10 mg immediate release tablet Take 1 tablet (10 mg) by mouth every 6 hours.      pregabalin (Lyrica) 150 mg capsule Take 1 capsule (150 mg) by mouth in the morning and 1 capsule (150 mg) in the evening and 1 capsule (150 mg) before bedtime.      Symbicort 160-4.5 mcg/actuation inhaler Inhale 2 puffs  in the morning and 2 puffs before bedtime.       No current facility-administered medications on file prior to visit.        HISTORICAL PHARMACOTHERAPY  - Metformin: cannot take d/t intolerance    SMBG (if applicable):      DRUG INTERACTIONS  - No significant drug-drug interactions exist that require change in therapy  _______________________________________________________________________  PATIENT EDUCATION/GOALS    1. Discussed disease specific goals:   - Fasting B - 130 mg/dL  - Postprandial BG: less than 180 mg/dL  - A1c: less than 7%    2. Since last visit, patient states that he has just now begun to feel better from his stomach infection. He does have GI follow-up scheduled to make sure that the GI issues are not related to previous GI cancer. Despite recent slight increase in daily BG avg, pt BG steadily declining overall. We will increase patient's insulin glargine from 45 -> 48 units BID.    3. If basal insulin changes insufficient to decrease BG, we can increase pt sliding scale but we will adjust basal insulin first. Current SS bolus regimen: Humalog 100 unit/mL:Inject 25 units for BG  and add 1 units per 25 above 120.  _______________________________________________________________________    Assessment/Plan   Problem List Items Addressed This Visit          Endocrine/Metabolic    Type 2 diabetes mellitus with hyperglycemia, with long-term current use of insulin (CMS/[facility]) - Primary     Increase Insulin Glargine to 48 units BID.  Continue all other medications unchanged.             Clinical Pharmacist follow-up: 2 weeks     Jose F Oneill, PharmD     Verbal consent to manage  patient's drug therapy was obtained from [the patient and/or an individual authorized to act on behalf of a patient]. They were informed they may decline to participate or withdraw from participation in pharmacy services at any time.

## 2023-06-07 DIAGNOSIS — I10 PRIMARY HYPERTENSION: ICD-10-CM

## 2023-06-07 RX ORDER — METOPROLOL TARTRATE 25 MG/1
25 TABLET, FILM COATED ORAL 2 TIMES DAILY
Qty: 180 TABLET | Refills: 1 | Status: SHIPPED | OUTPATIENT
Start: 2023-06-07 | End: 2023-10-25 | Stop reason: SDUPTHER

## 2023-06-20 ENCOUNTER — TELEMEDICINE (OUTPATIENT)
Dept: PHARMACY | Facility: HOSPITAL | Age: 63
End: 2023-06-20
Payer: COMMERCIAL

## 2023-06-20 ASSESSMENT — ENCOUNTER SYMPTOMS: DIABETIC ASSOCIATED SYMPTOMS: 0

## 2023-06-20 NOTE — PROGRESS NOTES
"Pharmacy Clinic Note    Jayy Coles is a 62 y.o. male was referred to Clinical Pharmacy Team for diabetes management. The patient was referred for their No chief complaint on file..    Referring Provider: LINSEY Campoverde    Subjective   Allergies   Allergen Reactions    Terbinafine Unknown    Penicillins Rash     \"red and hot\"       [facility] #[id number] [address] & [address] [address]  Phone: [phone] Fax: [phone]      Diabetes  He presents for his follow-up diabetic visit. He has type 2 diabetes mellitus. His disease course has been fluctuating. There are no hypoglycemic associated symptoms. There are no diabetic associated symptoms. Risk factors for coronary artery disease include diabetes mellitus, male sex, obesity and hypertension. Current diabetic treatment includes intensive insulin program and oral agent (monotherapy). He is compliant with treatment most of the time. His weight is decreasing steadily. Exercise: Increased temporarily d/t PT appointments, dut now decreased d/t injury. His home blood glucose trend is increasing steadily. His overall blood glucose range is >200 mg/dl.       Objective     There were no vitals taken for this visit.     LAB  Lab Results   Component Value Date    BILITOT 0.5 10/06/2022    CALCIUM 9.3 11/14/2022    CO2 29 11/14/2022    CL 98 11/14/2022    CREATININE 1.02 11/14/2022    GLUCOSE 315 (H) 11/14/2022    ALKPHOS 95 10/06/2022    K 4.1 11/14/2022    PROT 6.9 10/06/2022     (L) 11/14/2022    AST 28 10/06/2022    ALT 45 10/06/2022    BUN 18 11/14/2022    ANIONGAP 11 11/14/2022    MG 2.28 11/14/2022    PHOS 3.2 10/09/2021     (H) 10/02/2021    ALBUMIN 4.1 10/06/2022     Lab Results   Component Value Date    TRIG 382 (H) 10/06/2022    CHOL 192 10/06/2022    HDL 38.9 (A) 10/06/2022     Lab Results   Component Value Date    HGBA1C 11.0 (A) 05/09/2023 "       Current Outpatient Medications on File Prior to Visit   Medication Sig Dispense Refill    Advair Diskus 100-50 mcg/dose diskus inhaler Inhale 1 puff  in the morning and 1 puff before bedtime.      albuterol 90 mcg/actuation inhaler 2 puffs every 4 hours if needed for shortness of breath or wheezing (cough).      aspirin 325 mg tablet Take 1 tablet (325 mg) by mouth once daily.      blood-glucose sensor (FreeStyle Casandra 3 Sensor) device Apply one sensor to the skin every 14 days to test blood sugars 2 each 11    colestipol (Colestid) 1 gram tablet Take 2 tablets (2 g) by mouth in the morning and 2 tablets (2 g) before bedtime.      cyclobenzaprine (Flexeril) 5 mg tablet Take 1 tablet (5 mg) by mouth every 6 hours during the day.      docusate sodium (DOK) 100 mg capsule Take by mouth.      ferrous sulfate 325 (65 Fe) MG tablet Take by mouth.      FreeStyle Casandra 14 Day Sensor kit USE AS DIRECTED ON PACKAGE      FreeStyle Lite Strips strip Test 4 times daily      furosemide (Lasix) 20 mg tablet Take 2 tablets (40 mg) by mouth in the morning and 2 tablets (40 mg) before bedtime.      HumaLOG KwikPen Insulin 100 unit/mL injection Inject under the skin.      hyoscyamine ER (Levbid) 0.375 mg 12 hr tablet Take 1 tablet (0.375 mg) by mouth 2 times a day.      Jardiance 25 mg Take 1 tablet (25 mg) by mouth once daily.      Lantus Solostar U-100 Insulin 100 unit/mL (3 mL) pen Inject 45 Units under the skin 2 times a day. 30 mL 11    melatonin 10 mg capsule Take 1 capsule (10 mg) by mouth once daily at bedtime.      metoprolol tartrate (Lopressor) 25 mg tablet Take 1 tablet (25 mg) by mouth 2 times a day. 180 tablet 1    multivitamin capsule Take 1 capsule by mouth once daily.      naloxone (Narcan) 4 mg/0.1 mL nasal spray Administer into affected nostril(s).      nystatin (Mycostatin) cream APPLY AND RUB IN A THIN LAYER TOPICALLY TO THE AFFECTED AREA TWICE DAILY IN THE MORNING AND IN THE EVENING      oxyCODONE  (Roxicodone) 10 mg immediate release tablet Take 1 tablet (10 mg) by mouth every 6 hours.      pregabalin (Lyrica) 150 mg capsule Take 1 capsule (150 mg) by mouth in the morning and 1 capsule (150 mg) in the evening and 1 capsule (150 mg) before bedtime.      Symbicort 160-4.5 mcg/actuation inhaler Inhale 2 puffs  in the morning and 2 puffs before bedtime.       No current facility-administered medications on file prior to visit.        HISTORICAL PHARMACOTHERAPY  - Metformin: cannot take d/t intolerance    SMBG (if applicable):      DRUG INTERACTIONS  - No significant drug-drug interactions exist that require change in therapy  _______________________________________________________________________  PATIENT EDUCATION/GOALS    1. Discussed disease specific goals:   - Fasting B - 130 mg/dL  - Postprandial BG: less than 180 mg/dL  - A1c: less than 7%    2. Since last visit, patient BG have begun to increase again and he has establish with endocrinology who will be managing his diabetes now. Pt confirms the changes below to regimen and confirms new provider has access to his [employer] portal.  - Lantus -> Toujeo 90 units daily  - Humalog SS -> 30 units base + SS  _______________________________________________________________________    Assessment/Plan   Problem List Items Addressed This Visit    None       Clinical Pharmacist follow-up: 3 months to check progress and ensure transition to endocrinology team.    Jose F Oneill, PharmD     Verbal consent to manage patient's drug therapy was obtained from [the patient and/or an individual authorized to act on behalf of a patient]. They were informed they may decline to participate or withdraw from participation in pharmacy services at any time.

## 2023-08-15 LAB
ALANINE AMINOTRANSFERASE (SGPT) (U/L) IN SER/PLAS: 25 U/L (ref 10–52)
ALBUMIN (G/DL) IN SER/PLAS: 4.1 G/DL (ref 3.4–5)
ALKALINE PHOSPHATASE (U/L) IN SER/PLAS: 93 U/L (ref 33–136)
ANION GAP IN SER/PLAS: 14 MMOL/L (ref 10–20)
ASPARTATE AMINOTRANSFERASE (SGOT) (U/L) IN SER/PLAS: 20 U/L (ref 9–39)
BASOPHILS (10*3/UL) IN BLOOD BY AUTOMATED COUNT: 0.1 X10E9/L (ref 0–0.1)
BASOPHILS/100 LEUKOCYTES IN BLOOD BY AUTOMATED COUNT: 0.9 % (ref 0–2)
BILIRUBIN TOTAL (MG/DL) IN SER/PLAS: 0.5 MG/DL (ref 0–1.2)
C PEPTIDE (NG/ML) IN SER/PLAS: 1.3 NG/ML (ref 0.7–3.9)
CALCIUM (MG/DL) IN SER/PLAS: 9.1 MG/DL (ref 8.6–10.3)
CARBON DIOXIDE, TOTAL (MMOL/L) IN SER/PLAS: 28 MMOL/L (ref 21–32)
CHLORIDE (MMOL/L) IN SER/PLAS: 99 MMOL/L (ref 98–107)
CHOLESTEROL (MG/DL) IN SER/PLAS: 205 MG/DL (ref 0–199)
CHOLESTEROL IN HDL (MG/DL) IN SER/PLAS: 44.2 MG/DL
CHOLESTEROL/HDL RATIO: 4.6
CREATININE (MG/DL) IN SER/PLAS: 1.01 MG/DL (ref 0.5–1.3)
CREATININE (MG/DL) IN URINE: 56.4 MG/DL (ref 20–370)
EOSINOPHILS (10*3/UL) IN BLOOD BY AUTOMATED COUNT: 0.13 X10E9/L (ref 0–0.7)
EOSINOPHILS/100 LEUKOCYTES IN BLOOD BY AUTOMATED COUNT: 1.1 % (ref 0–6)
ERYTHROCYTE DISTRIBUTION WIDTH (RATIO) BY AUTOMATED COUNT: 16.6 % (ref 11.5–14.5)
ERYTHROCYTE DISTRIBUTION WIDTH (RATIO) BY AUTOMATED COUNT: NORMAL
ERYTHROCYTE MEAN CORPUSCULAR HEMOGLOBIN CONCENTRATION (G/DL) BY AUTOMATED: 32.3 G/DL (ref 32–36)
ERYTHROCYTE MEAN CORPUSCULAR HEMOGLOBIN CONCENTRATION (G/DL) BY AUTOMATED: NORMAL
ERYTHROCYTE MEAN CORPUSCULAR VOLUME (FL) BY AUTOMATED COUNT: 93 FL (ref 80–100)
ERYTHROCYTE MEAN CORPUSCULAR VOLUME (FL) BY AUTOMATED COUNT: NORMAL
ERYTHROCYTES (10*6/UL) IN BLOOD BY AUTOMATED COUNT: 5.79 X10E12/L (ref 4.5–5.9)
ERYTHROCYTES (10*6/UL) IN BLOOD BY AUTOMATED COUNT: NORMAL
GFR MALE: 83 ML/MIN/1.73M2
GLUCOSE (MG/DL) IN SER/PLAS: 194 MG/DL (ref 74–99)
HEMATOCRIT (%) IN BLOOD BY AUTOMATED COUNT: 53.6 % (ref 41–52)
HEMATOCRIT (%) IN BLOOD BY AUTOMATED COUNT: NORMAL
HEMOGLOBIN (G/DL) IN BLOOD: 17.3 G/DL (ref 13.5–17.5)
HEMOGLOBIN (G/DL) IN BLOOD: NORMAL
IMMATURE GRANULOCYTES/100 LEUKOCYTES IN BLOOD BY AUTOMATED COUNT: 0.8 % (ref 0–0.9)
LDL: 92 MG/DL (ref 0–99)
LEUKOCYTES (10*3/UL) IN BLOOD BY AUTOMATED COUNT: 11.4 X10E9/L (ref 4.4–11.3)
LEUKOCYTES (10*3/UL) IN BLOOD BY AUTOMATED COUNT: NORMAL
LYMPHOCYTES (10*3/UL) IN BLOOD BY AUTOMATED COUNT: 3.13 X10E9/L (ref 1.2–4.8)
LYMPHOCYTES/100 LEUKOCYTES IN BLOOD BY AUTOMATED COUNT: 27.5 % (ref 13–44)
MAGNESIUM (MG/DL) IN SER/PLAS: 2.31 MG/DL (ref 1.6–2.4)
MONOCYTES (10*3/UL) IN BLOOD BY AUTOMATED COUNT: 1.48 X10E9/L (ref 0.1–1)
MONOCYTES/100 LEUKOCYTES IN BLOOD BY AUTOMATED COUNT: 13 % (ref 2–10)
NATRIURETIC PEPTIDE B (PG/ML) IN SER/PLAS: 38 PG/ML (ref 0–99)
NEUTROPHILS (10*3/UL) IN BLOOD BY AUTOMATED COUNT: 6.45 X10E9/L (ref 1.2–7.7)
NEUTROPHILS/100 LEUKOCYTES IN BLOOD BY AUTOMATED COUNT: 56.7 % (ref 40–80)
NON HDL CHOLESTEROL: 161 MG/DL
NRBC (PER 100 WBCS) BY AUTOMATED COUNT: NORMAL
PLATELETS (10*3/UL) IN BLOOD AUTOMATED COUNT: 213 X10E9/L (ref 150–450)
PLATELETS (10*3/UL) IN BLOOD AUTOMATED COUNT: NORMAL
POTASSIUM (MMOL/L) IN SER/PLAS: 3.9 MMOL/L (ref 3.5–5.3)
PROTEIN TOTAL: 7.7 G/DL (ref 6.4–8.2)
SODIUM (MMOL/L) IN SER/PLAS: 137 MMOL/L (ref 136–145)
THYROTROPIN (MIU/L) IN SER/PLAS BY DETECTION LIMIT <= 0.05 MIU/L: 4.03 MIU/L (ref 0.44–3.98)
THYROXINE (T4) FREE (NG/DL) IN SER/PLAS: 0.79 NG/DL (ref 0.61–1.12)
TRIGLYCERIDE (MG/DL) IN SER/PLAS: 344 MG/DL (ref 0–149)
UREA NITROGEN (MG/DL) IN SER/PLAS: 24 MG/DL (ref 6–23)
VLDL: 69 MG/DL (ref 0–40)

## 2023-08-18 PROBLEM — R91.8 GROUND GLASS OPACITY PRESENT ON IMAGING OF LUNG: Status: ACTIVE | Noted: 2023-08-18

## 2023-08-18 PROBLEM — Z85.038 HISTORY OF COLON CANCER: Status: ACTIVE | Noted: 2023-08-18

## 2023-08-18 PROBLEM — E66.01 MORBID (SEVERE) OBESITY DUE TO EXCESS CALORIES (MULTI): Status: ACTIVE | Noted: 2023-08-18

## 2023-08-18 PROBLEM — M79.18: Status: ACTIVE | Noted: 2023-08-18

## 2023-08-18 PROBLEM — G47.30 BREATHING-RELATED SLEEP DISORDER: Status: ACTIVE | Noted: 2023-08-18

## 2023-08-18 PROBLEM — Z79.899 ON POTASSIUM WASTING DIURETIC THERAPY: Status: ACTIVE | Noted: 2023-08-18

## 2023-08-18 PROBLEM — M17.0 PRIMARY OSTEOARTHRITIS OF BOTH KNEES: Status: ACTIVE | Noted: 2023-08-18

## 2023-08-18 PROBLEM — R06.83 SNORES: Status: ACTIVE | Noted: 2023-08-18

## 2023-08-18 PROBLEM — Z96.659 PRESENCE OF UNSPECIFIED ARTIFICIAL KNEE JOINT: Status: ACTIVE | Noted: 2023-08-18

## 2023-08-18 RX ORDER — FUROSEMIDE 20 MG/1
4 TABLET ORAL 2 TIMES DAILY
COMMUNITY
Start: 2021-01-29 | End: 2023-09-12 | Stop reason: SDUPTHER

## 2023-08-18 RX ORDER — BLOOD-GLUCOSE METER
KIT MISCELLANEOUS 4 TIMES DAILY
COMMUNITY
Start: 2020-05-22

## 2023-08-18 RX ORDER — SOD SULF/POT CHLORIDE/MAG SULF 1.479 G
TABLET ORAL
COMMUNITY
Start: 2023-05-31 | End: 2023-09-12 | Stop reason: ALTCHOICE

## 2023-08-18 RX ORDER — OXYCODONE HYDROCHLORIDE 5 MG/1
2 TABLET ORAL 4 TIMES DAILY PRN
COMMUNITY
Start: 2022-10-03 | End: 2023-12-14 | Stop reason: WASHOUT

## 2023-08-18 RX ORDER — DICYCLOMINE HYDROCHLORIDE 10 MG/1
1 CAPSULE ORAL 3 TIMES DAILY
COMMUNITY
Start: 2023-05-31 | End: 2024-02-19 | Stop reason: WASHOUT

## 2023-08-18 RX ORDER — EMPAGLIFLOZIN 10 MG/1
10 TABLET, FILM COATED ORAL DAILY
COMMUNITY
End: 2023-09-12 | Stop reason: WASHOUT

## 2023-08-18 RX ORDER — INSULIN GLARGINE 300 U/ML
INJECTION, SOLUTION SUBCUTANEOUS EVERY MORNING
COMMUNITY
Start: 2023-06-15 | End: 2024-01-04

## 2023-08-18 RX ORDER — ASPIRIN 325 MG
325 TABLET, DELAYED RELEASE (ENTERIC COATED) ORAL 2 TIMES DAILY
COMMUNITY
End: 2023-09-12 | Stop reason: ALTCHOICE

## 2023-08-18 RX ORDER — ACETAMINOPHEN, DIPHENHYDRAMINE HCL, PHENYLEPHRINE HCL 325; 25; 5 MG/1; MG/1; MG/1
1 TABLET ORAL NIGHTLY PRN
COMMUNITY
End: 2023-12-14 | Stop reason: WASHOUT

## 2023-08-18 RX ORDER — INSULIN GLARGINE-YFGN 100 [IU]/ML
80 INJECTION, SOLUTION SUBCUTANEOUS EVERY MORNING
COMMUNITY
Start: 2022-03-25 | End: 2023-11-16 | Stop reason: WASHOUT

## 2023-08-18 RX ORDER — INSULIN PUMP SYRINGE, 3 ML
1 EACH MISCELLANEOUS
COMMUNITY

## 2023-09-12 ENCOUNTER — OFFICE VISIT (OUTPATIENT)
Dept: PRIMARY CARE | Facility: CLINIC | Age: 63
End: 2023-09-12
Payer: MEDICARE

## 2023-09-12 VITALS
HEIGHT: 69 IN | WEIGHT: 315 LBS | SYSTOLIC BLOOD PRESSURE: 107 MMHG | HEART RATE: 68 BPM | BODY MASS INDEX: 46.65 KG/M2 | DIASTOLIC BLOOD PRESSURE: 69 MMHG

## 2023-09-12 DIAGNOSIS — J45.20 MILD INTERMITTENT ASTHMA, UNSPECIFIED WHETHER COMPLICATED (HHS-HCC): ICD-10-CM

## 2023-09-12 DIAGNOSIS — E11.65 TYPE 2 DIABETES MELLITUS WITH HYPERGLYCEMIA, WITH LONG-TERM CURRENT USE OF INSULIN (MULTI): Primary | ICD-10-CM

## 2023-09-12 DIAGNOSIS — E66.01 MORBID (SEVERE) OBESITY DUE TO EXCESS CALORIES (MULTI): ICD-10-CM

## 2023-09-12 DIAGNOSIS — I70.0 ATHEROSCLEROSIS OF AORTA (CMS-HCC): ICD-10-CM

## 2023-09-12 DIAGNOSIS — Z79.4 TYPE 2 DIABETES MELLITUS WITH HYPERGLYCEMIA, WITH LONG-TERM CURRENT USE OF INSULIN (MULTI): Primary | ICD-10-CM

## 2023-09-12 DIAGNOSIS — Z99.81 ON HOME OXYGEN THERAPY: ICD-10-CM

## 2023-09-12 DIAGNOSIS — I50.30 HEART FAILURE WITH PRESERVED LEFT VENTRICULAR FUNCTION (HFPEF) (MULTI): ICD-10-CM

## 2023-09-12 DIAGNOSIS — E78.2 MIXED HYPERLIPIDEMIA: ICD-10-CM

## 2023-09-12 DIAGNOSIS — R60.0 LOCALIZED EDEMA: ICD-10-CM

## 2023-09-12 PROCEDURE — 3078F DIAST BP <80 MM HG: CPT | Performed by: NURSE PRACTITIONER

## 2023-09-12 PROCEDURE — 1036F TOBACCO NON-USER: CPT | Performed by: NURSE PRACTITIONER

## 2023-09-12 PROCEDURE — 99213 OFFICE O/P EST LOW 20 MIN: CPT | Performed by: NURSE PRACTITIONER

## 2023-09-12 PROCEDURE — 3074F SYST BP LT 130 MM HG: CPT | Performed by: NURSE PRACTITIONER

## 2023-09-12 RX ORDER — ROSUVASTATIN CALCIUM 10 MG/1
10 TABLET, COATED ORAL DAILY
COMMUNITY
Start: 2023-09-11 | End: 2024-02-01 | Stop reason: SDUPTHER

## 2023-09-12 RX ORDER — SEMAGLUTIDE 0.68 MG/ML
INJECTION, SOLUTION SUBCUTANEOUS
COMMUNITY
Start: 2023-08-29 | End: 2023-12-14 | Stop reason: WASHOUT

## 2023-09-12 NOTE — PATIENT INSTRUCTIONS
Thank you for seeing me today.  It was a pleasure to see you again!    Continue all medications    F/U with Endocrinology as scheduled    Continue to wear compression socks     Exercise regularly     RTC 6 MONTHS FOR WELCOME TO MEDICARE

## 2023-09-12 NOTE — PROGRESS NOTES
Subjective   Chief Complaint   Patient presents with    Follow-up     JAYY IS HERE TODAY FOR ROUTINE F/U .   A1C COMPLETED ON 8/18/23 AND WAS 9.0%        Patient ID: Jayy Coles is a 62 y.o. male who presents for Follow-up (JAYY IS HERE TODAY FOR ROUTINE F/U . /A1C COMPLETED ON 8/18/23 AND WAS 9.0% ).    HPI    63 yo male presents for 6 mo f/u   Pt is accompanied by his wife Arianna today      PMH: Hx Afib, EDEMA, HFpEF, DVT RLE, T2DM, ASTHMA, GERD, HLD, COLON CA, LEFT TKR w/ mult revisions 2/2 infections     Pt was hospitalized, on ventilator at [facility] in October 2021 w/Covid   Pt still wearing O2 at 2 lpm when active----> seeing Dr. Vásquez, Pulm  Pt had CT chest on 12/29/21 per Dr. Vásquez----> resolving Covid Pneumonia, + Pulm HTN  had PFTs in March 2022  states he is SOB w/exertion  not using Symbicort  Pt did sleep study in 8/2023 at [facility]      Pt reports that he has been feeling ok  still gets SOB at times, wearing O2 at home, not portable (wants portable tank)  Did Pulm Rehab in Dec 2022  Had trouble walking > 4 min on treadmill  Not currently exercising d/t SOB  Would like to get portable O2 tank      Pt saw new pain mgmt MD---> MD is requesting to implant a wire stimulator from LEFT knee to ankle to help with pain   Pt is seeing Dr. Mosquera   He is currently taking Oxycodone 3x/day      #HFpEF/CAD/Hx AFIB/EDEMA   Seeing cardiology, Dr. Thomson; LOV A  Rx Lasix 4 tabs in am and 3 tabs in pm (total 140 mg/day)  Pt has 2-3+ B/L ankle edema    Wearing knee high compression     #T2DM  chronic, uncontrolled--->  [facility] assisting, next VV   dx'd 1995   Was seeing Dr. Booker until he retired---> now seeing [facility]   A1C= 11% May 2023---> 9% Aug 2023  Meds: Ozempic 0.5 mg weekly, Taking Lantus 98 u qAM and SSI Humalog w/meals (25-35u), Jardiance 25 mg daily   last eye exam: 2022  neuropathy: improved w/Lyrica  Statin: Yes      #ASTHMA  triggers: fall weather  Albuterol PRN; uses mostly in the fall  Rx:  "Symbicort (not using daily)   nonsmoker     #GERD  no meds  triggers: spicy foods      #HLD  taking Colestipol  lipid panel Oct 2022-----> 192/77/382     #HM  FBW: UTD per Endo 8/2023    Review of Systems  All 13 systems were reviewed and are within normal limits except positive and pertinent negative responses which are noted below or in HPI.      Objective   /69   Pulse 68   Ht 1.753 m (5' 9\")   Wt 148 kg (327 lb)   BMI 48.29 kg/m²        Physical Exam  Vitals reviewed.   Constitutional:       Appearance: He is obese.   Cardiovascular:      Pulses: Normal pulses.   Pulmonary:      Effort: Pulmonary effort is normal.   Neurological:      Mental Status: He is alert.   Psychiatric:         Mood and Affect: Mood normal.         Assessment/Plan   Problem List Items Addressed This Visit       Type 2 diabetes mellitus with hyperglycemia, with long-term current use of insulin (CMS/HCC) - Primary    Asthma    Atherosclerosis of aorta (CMS/HCC)    Edema    Heart failure with preserved left ventricular function (HFpEF) (CMS/[facility])    Hyperlipidemia    On home oxygen therapy    Morbid (severe) obesity due to excess calories (CMS/[facility])       "

## 2023-09-19 ENCOUNTER — APPOINTMENT (OUTPATIENT)
Dept: PHARMACY | Facility: HOSPITAL | Age: 63
End: 2023-09-19
Payer: MEDICARE

## 2023-09-24 PROBLEM — E66.01 MORBID OBESITY WITH BMI OF 50.0-59.9, ADULT (MULTI): Status: ACTIVE | Noted: 2023-09-24

## 2023-09-24 PROBLEM — E03.8 SUBCLINICAL HYPOTHYROIDISM: Status: ACTIVE | Noted: 2023-09-24

## 2023-09-24 PROBLEM — Z79.891 CHRONIC USE OF OPIATE FOR THERAPEUTIC PURPOSE: Status: ACTIVE | Noted: 2023-09-24

## 2023-09-24 PROBLEM — I10 PRIMARY HYPERTENSION: Status: ACTIVE | Noted: 2023-09-24

## 2023-09-24 PROBLEM — G89.29 CHRONIC PAIN OF LEFT KNEE: Status: ACTIVE | Noted: 2023-09-24

## 2023-09-24 PROBLEM — M25.562 CHRONIC PAIN OF LEFT KNEE: Status: ACTIVE | Noted: 2023-09-24

## 2023-09-24 RX ORDER — PEN NEEDLE, DIABETIC 30 GX3/16"
NEEDLE, DISPOSABLE MISCELLANEOUS
COMMUNITY

## 2023-09-24 RX ORDER — BIOTIN 1 MG
1000 TABLET ORAL DAILY
COMMUNITY
End: 2023-12-14 | Stop reason: WASHOUT

## 2023-09-24 RX ORDER — CEPHALEXIN 500 MG/1
500 CAPSULE ORAL 2 TIMES DAILY
COMMUNITY
End: 2023-12-14 | Stop reason: WASHOUT

## 2023-09-24 RX ORDER — ASPIRIN 81 MG/1
81 TABLET ORAL DAILY
COMMUNITY
End: 2023-12-14 | Stop reason: WASHOUT

## 2023-09-24 RX ORDER — FUROSEMIDE 20 MG/1
80 TABLET ORAL 2 TIMES DAILY
COMMUNITY
End: 2023-12-14 | Stop reason: SDUPTHER

## 2023-10-04 ENCOUNTER — PHARMACY VISIT (OUTPATIENT)
Dept: PHARMACY | Facility: CLINIC | Age: 63
End: 2023-10-04
Payer: MEDICARE

## 2023-10-04 PROCEDURE — RXMED WILLOW AMBULATORY MEDICATION CHARGE

## 2023-10-04 RX ORDER — CEPHALEXIN 500 MG/1
500 CAPSULE ORAL 2 TIMES DAILY
Qty: 60 CAPSULE | Refills: 2 | OUTPATIENT
Start: 2023-06-12 | End: 2024-01-08 | Stop reason: SDUPTHER

## 2023-10-05 ENCOUNTER — PHARMACY VISIT (OUTPATIENT)
Dept: PHARMACY | Facility: CLINIC | Age: 63
End: 2023-10-05
Payer: COMMERCIAL

## 2023-10-05 PROCEDURE — RXMED WILLOW AMBULATORY MEDICATION CHARGE

## 2023-10-12 ENCOUNTER — OFFICE VISIT (OUTPATIENT)
Dept: ORTHOPEDIC SURGERY | Facility: CLINIC | Age: 63
End: 2023-10-12
Payer: MEDICARE

## 2023-10-12 DIAGNOSIS — M25.561 CHRONIC PAIN OF RIGHT KNEE: Primary | ICD-10-CM

## 2023-10-12 DIAGNOSIS — G89.29 CHRONIC PAIN OF RIGHT KNEE: Primary | ICD-10-CM

## 2023-10-12 PROCEDURE — 1036F TOBACCO NON-USER: CPT | Performed by: ORTHOPAEDIC SURGERY

## 2023-10-12 PROCEDURE — 3077F SYST BP >= 140 MM HG: CPT | Performed by: ORTHOPAEDIC SURGERY

## 2023-10-12 PROCEDURE — 3079F DIAST BP 80-89 MM HG: CPT | Performed by: ORTHOPAEDIC SURGERY

## 2023-10-12 PROCEDURE — 20610 DRAIN/INJ JOINT/BURSA W/O US: CPT | Performed by: ORTHOPAEDIC SURGERY

## 2023-10-12 PROCEDURE — 99024 POSTOP FOLLOW-UP VISIT: CPT | Performed by: ORTHOPAEDIC SURGERY

## 2023-10-12 PROCEDURE — 1160F RVW MEDS BY RX/DR IN RCRD: CPT | Performed by: ORTHOPAEDIC SURGERY

## 2023-10-12 PROCEDURE — 1159F MED LIST DOCD IN RCRD: CPT | Performed by: ORTHOPAEDIC SURGERY

## 2023-10-12 PROCEDURE — 1125F AMNT PAIN NOTED PAIN PRSNT: CPT | Performed by: ORTHOPAEDIC SURGERY

## 2023-10-12 NOTE — PROGRESS NOTES
63-year-old male here for gel injection right knee.    Right knee exam: skin intact no lacerations or abrations. no effusion.  Tender medial joint line. negative log roll negative patellar grind. ROM 0-120. stable to varus and valgus stress at 0 and 30 degrees. negative lachman negative posterior drawer negative vianca. 5/5 ehl/fhl/gs/ta. silt s/s/sp/dp/t. 2+ dp/pt    Procedure Note:    Diagnosis: right knee arthritis  Procedure: right knee injection    Verbal consent was obtained from the patient, risks benefits and alternatives were discussed. We discussed the risks and benefits and potential morbidity related to the treatment, and to the prescription medication administered in the injectionA timeout was performed, and the correct patient and site of injection were identified and verified. The lateral side of the knee was sterilized with Betadine, and anesthetized with ethyl chloride spray. The right knee was injected with 3 mL Durolane in the usual fashion. A sterile Band-Aid was placed. The patient tolerated the procedure well with no immediate complications. Standard post injection precautions and instructions were given.    I will see him back as needed

## 2023-10-17 ENCOUNTER — PHARMACY VISIT (OUTPATIENT)
Dept: PHARMACY | Facility: CLINIC | Age: 63
End: 2023-10-17
Payer: MEDICARE

## 2023-10-18 DIAGNOSIS — G89.29 CHRONIC PAIN OF LEFT KNEE: ICD-10-CM

## 2023-10-18 DIAGNOSIS — M17.0 PRIMARY OSTEOARTHRITIS OF BOTH KNEES: ICD-10-CM

## 2023-10-18 DIAGNOSIS — M25.562 CHRONIC PAIN OF LEFT KNEE: ICD-10-CM

## 2023-10-18 RX ORDER — PREGABALIN 150 MG/1
150 CAPSULE ORAL 3 TIMES DAILY
Qty: 90 CAPSULE | Refills: 2 | Status: SHIPPED | OUTPATIENT
Start: 2023-10-18 | End: 2023-11-16 | Stop reason: SDUPTHER

## 2023-10-20 NOTE — PROGRESS NOTES
Location of Pain:  B/L Knee   Pain worse with:  Pain better with:  Pain medication prescribed by us: Oxycodone 10 mg QID  Pain medication prescribed by other provider: no  Any adverse effects from medication: none  Average pain score with medication (0-10):   4        Percent effective: 80%  Do you take medicine exactly as prescribed? yes  Functional status (work, disability, retired, etc): Retired  Ability to manage activities of daily living: Yes  Overall quality of family/social life with the current treatment (below average, average or above average): Average  ER visit since last office visit:  New medical issues since last office visit: Dr. Mosquera 09/15/2023, to have Right Knee Gel Injection   Participating in (PT, Chiropractor, Tens Unit, Acupuncture, Aqua Therapy, Home Exercise):    Last Controlled Substance Contract date: 02/07/2023  Last toxicology date:   02/07/2023                Consistent with prescribed meds: yes  OARRS reviewed: yes  Daily MME: 63.01  Yearly Narcan prescribed: 2023

## 2023-10-23 NOTE — PROGRESS NOTES
Subjective   Patient ID: Lawrence Villalobos is a 63 y.o. male who presents for No chief complaint on file..  HPI    Lawrence follows up for interval reevaluation of his chronic left knee pain and is status post surgeries x7 to the knee. He is with chronic knee pain of aching and stiffness but also with burning sharp pain to the knee. Does have weakness. No falls since last office visit.     Last office visit right genicular nerve block was considered as well as peripheral nerve stimulator for fibular nerve. Discussed these with his orthopedic surgeon and concluded that since he is with history of infections that he might not be an optimal candidate for these.     History of intra-articular right knee joint injections have helped with weightbearing activities and decreasing pain and improving function.. The last being December 2022.     Was in pulmonary rehab and was riding a recumbent bike. Developed Achilles tendinitis. No longer rides bike.     Oxycodone 10 mg 4 times a day along with Lyrica 150 mg 3 times a day helps to reduce pain and improve function to the left knee up to 80% for several hours. Average pain score is 4 out of 10 with medication. Denies negative side effects from medication.     Has an average quality of family and social life with current condition and treatment. Has not been to the ED for pain since last office visit.     Toxicology consistent February 7, 2023. Annual controlled substance agreement and opioid risk tool are completed and scanned into the chart February 7, 2023.    Results/Data  Xray Knee Complete 4 or more View 11May2023 10:53AM Carl Mosquera      Test Name Result Flag Reference   Xray Knee Complete 4 or more View (Report)       FINAL REPORT  Interpreted by: DAPHNE PATHAK CHRISTOPHER, MD   05/12/23 08:11  MRN: [id number]  Patient Name: LAWRENCE VILLALOBOS     STUDY:  KNEE; COMPLT, 4 OR MORE VIEWS     INDICATION:  AP WB bilat in one shot , PA 30 degree flex WB bilat in one shot  (no  orthoball), LAT merchant view 30 degree flex bilat in one shot (no  orthoball) M25.562: Chronic pain of left knee G89.29:.     COMPARISON:  May 5, 2022     ACCESSION NUMBER(S):  [id number]     ORDERING CLINICIAN:  MARY WILLOUGHBY     FINDINGS:  Long-stem revision left total knee arthroplasty. Appearance is  unchanged from prior examination. Prior medial femoral condylar  fracture unchanged. No new lucencies or malalignment.     IMPRESSION:  Unchanged appearance of the long-stem revision left total knee  arthroplasty without evidence of new complication.     Electronically signed by: DAPHNE PATHAK  05/12/23 08:11      'Scores and Scales'        Signatures   Electronically signed by : GEOVANNI Godoy-CNP; Aug  2 2023  2:39PM EST (Author)     Review of Systems    Objective   Physical Exam    Assessment/Plan   {Assess/PlanSmartLinks:[id number]}

## 2023-10-24 ENCOUNTER — APPOINTMENT (OUTPATIENT)
Dept: PAIN MEDICINE | Facility: CLINIC | Age: 63
End: 2023-10-24
Payer: MEDICARE

## 2023-10-25 DIAGNOSIS — E11.65 TYPE 2 DIABETES MELLITUS WITH HYPERGLYCEMIA, WITH LONG-TERM CURRENT USE OF INSULIN (MULTI): Primary | ICD-10-CM

## 2023-10-25 DIAGNOSIS — Z79.4 TYPE 2 DIABETES MELLITUS WITH HYPERGLYCEMIA, WITH LONG-TERM CURRENT USE OF INSULIN (MULTI): Primary | ICD-10-CM

## 2023-10-25 DIAGNOSIS — I10 PRIMARY HYPERTENSION: ICD-10-CM

## 2023-10-25 RX ORDER — METOPROLOL TARTRATE 25 MG/1
25 TABLET, FILM COATED ORAL 2 TIMES DAILY
Qty: 180 TABLET | Refills: 0 | Status: SHIPPED | OUTPATIENT
Start: 2023-10-25 | End: 2024-01-29 | Stop reason: SDUPTHER

## 2023-10-26 NOTE — PROGRESS NOTES
Subjective   Patient ID: Lawrence Villalobos is a 63 y.o. male who presents for No chief complaint on file..  HPI    Lawrence follows up for interval reevaluation of his chronic left knee pain and is status post surgeries x7 to the knee. He is with chronic knee pain of aching and stiffness but also with burning sharp pain to the knee. Does have weakness. No falls since last office visit.    Since last office visit did have a right intra-articular injection from orthopedic surgeon September 15, 2023.  Reports that this did help to reduce pain and improve function up to 50%.  Right knee has been getting with increased pain and weaker.  He did fall after the knee gave out.  Right knee pain is greater than left knee pain today.  No change in left knee pain since last office visit.    Oxycodone 10 mg 4 times a day along with Lyrica 150 mg 3 times a day helps to reduce pain and improve function to the left knee up to 80% for several hours. Average pain score is 4 out of 10 with medication to the left knee and 6 out of 10 with medication to the right knee.. Denies negative side effects from medication.     Has an average quality of family and social life with current condition and treatment. Has not been to the ED for pain since last office visit.     Toxicology consistent February 7, 2023. Annual controlled substance agreement and opioid risk tool are completed and scanned into the chart February 7, 2023.    Results/Data  Xray Knee Complete 4 or more View [id number] 10:53AM Carl Mosquera      Test Name Result Flag Reference   Xray Knee Complete 4 or more View (Report)       FINAL REPORT  Interpreted by: DAPHNE PATHAK CHRISTOPHER, MD   05/12/23 08:11  MRN: [id number]  Patient Name: LAWRENCE VILLALOBOS     STUDY:  KNEE; COMPLT, 4 OR MORE VIEWS     INDICATION:  AP WB bilat in one shot , PA 30 degree flex WB bilat in one shot (no  orthoball), LAT merchant view 30 degree flex bilat in one shot (no  orthoball) M25.562: Chronic pain of left  knee G89.29:.     COMPARISON:  May 5, 2022     ACCESSION NUMBER(S):  [id number]     ORDERING CLINICIAN:  MARY WILLOUGHBY     FINDINGS:  Long-stem revision left total knee arthroplasty. Appearance is  unchanged from prior examination. Prior medial femoral condylar  fracture unchanged. No new lucencies or malalignment.     IMPRESSION:  Unchanged appearance of the long-stem revision left total knee  arthroplasty without evidence of new complication.     Electronically signed by: DAPHNE PATHAK  05/12/23 08:11       Review of Systems   Constitutional: Negative.    Respiratory: Negative.          Awaiting order for portable oxygen machine from insurance.  Is with shortness of breath during exertion such as walking and getting up around about.   Cardiovascular: Negative.    Gastrointestinal: Negative.    Endocrine: Negative.    Genitourinary: Negative.    Musculoskeletal:  Positive for arthralgias, back pain, gait problem and myalgias.   Skin:  Positive for rash.   Allergic/Immunologic: Negative.    Neurological:  Positive for weakness. Negative for dizziness, tremors, seizures, syncope, facial asymmetry, speech difficulty, light-headedness, numbness and headaches.   Hematological: Negative.    Psychiatric/Behavioral: Negative.         Objective   Physical Exam  Vitals and nursing note reviewed.   Constitutional:       Appearance: Normal appearance.   HENT:      Head: Normocephalic and atraumatic.   Cardiovascular:      Pulses: Normal pulses.   Pulmonary:      Effort: Pulmonary effort is normal. No respiratory distress.   Musculoskeletal:      Right lower leg: Edema present.      Left lower leg: Edema present.      Comments: Trace to +1 pitting edema to ankles and pretibial areas.    Right knee with pain during extension and flexion.  Joint line tenderness to palpation.    Right knee with generalized mild swelling.    Left knee with pain during extension and flexion.  Joint line tenderness to palpation.    Pain with palpation  over the well-healed left knee linear scar.    Generalized mild swelling.    Ambulates with mildly antalgic gait.   Skin:     General: Skin is warm and dry.      Capillary Refill: Capillary refill takes 2 to 3 seconds.   Neurological:      Mental Status: He is alert and oriented to person, place, and time.      Cranial Nerves: Cranial nerve deficit present.      Motor: Weakness present.      Gait: Gait abnormal.   Psychiatric:         Mood and Affect: Mood normal.         Behavior: Behavior normal.         Assessment/Plan   Problem List Items Addressed This Visit             ICD-10-CM    Right knee pain M25.561    Relevant Medications    oxyCODONE (Roxicodone) 10 mg immediate release tablet (Start on 11/27/2023)    oxyCODONE (Roxicodone) 10 mg immediate release tablet (Start on 12/24/2023)    Status post revision of total replacement of left knee Z96.652    Relevant Medications    oxyCODONE (Roxicodone) 10 mg immediate release tablet (Start on 11/27/2023)    oxyCODONE (Roxicodone) 10 mg immediate release tablet (Start on 12/24/2023)    Primary osteoarthritis of both knees - Primary M17.0    Relevant Medications    oxyCODONE (Roxicodone) 10 mg immediate release tablet (Start on 11/27/2023)    oxyCODONE (Roxicodone) 10 mg immediate release tablet (Start on 12/24/2023)    Chronic pain of left knee M25.562, G89.29    Relevant Medications    oxyCODONE (Roxicodone) 10 mg immediate release tablet (Start on 11/27/2023)    oxyCODONE (Roxicodone) 10 mg immediate release tablet (Start on 12/24/2023)     Follow-up 12 weeks.    Reviewed and approved by MALLIKA NEIL on 10/30/23 at 1:56 PM.

## 2023-10-30 ENCOUNTER — OFFICE VISIT (OUTPATIENT)
Dept: PAIN MEDICINE | Facility: CLINIC | Age: 63
End: 2023-10-30
Payer: MEDICARE

## 2023-10-30 VITALS — RESPIRATION RATE: 18 BRPM | HEART RATE: 77 BPM | SYSTOLIC BLOOD PRESSURE: 145 MMHG | DIASTOLIC BLOOD PRESSURE: 82 MMHG

## 2023-10-30 DIAGNOSIS — M25.561 CHRONIC PAIN OF RIGHT KNEE: ICD-10-CM

## 2023-10-30 DIAGNOSIS — G89.29 CHRONIC PAIN OF RIGHT KNEE: ICD-10-CM

## 2023-10-30 DIAGNOSIS — Z96.652 STATUS POST REVISION OF TOTAL REPLACEMENT OF LEFT KNEE: ICD-10-CM

## 2023-10-30 DIAGNOSIS — G89.29 CHRONIC PAIN OF LEFT KNEE: ICD-10-CM

## 2023-10-30 DIAGNOSIS — M17.0 PRIMARY OSTEOARTHRITIS OF BOTH KNEES: Primary | ICD-10-CM

## 2023-10-30 DIAGNOSIS — M25.562 CHRONIC PAIN OF LEFT KNEE: ICD-10-CM

## 2023-10-30 PROCEDURE — 99213 OFFICE O/P EST LOW 20 MIN: CPT | Performed by: NURSE PRACTITIONER

## 2023-10-30 PROCEDURE — 3077F SYST BP >= 140 MM HG: CPT | Performed by: NURSE PRACTITIONER

## 2023-10-30 PROCEDURE — 1160F RVW MEDS BY RX/DR IN RCRD: CPT | Performed by: NURSE PRACTITIONER

## 2023-10-30 PROCEDURE — 1159F MED LIST DOCD IN RCRD: CPT | Performed by: NURSE PRACTITIONER

## 2023-10-30 PROCEDURE — 1036F TOBACCO NON-USER: CPT | Performed by: NURSE PRACTITIONER

## 2023-10-30 PROCEDURE — 3079F DIAST BP 80-89 MM HG: CPT | Performed by: NURSE PRACTITIONER

## 2023-10-30 RX ORDER — OXYCODONE HYDROCHLORIDE 10 MG/1
10 TABLET ORAL EVERY 6 HOURS PRN
Qty: 112 TABLET | Refills: 0 | Status: SHIPPED | OUTPATIENT
Start: 2023-10-30 | End: 2023-10-30 | Stop reason: SDUPTHER

## 2023-10-30 RX ORDER — OXYCODONE HYDROCHLORIDE 10 MG/1
10 TABLET ORAL 4 TIMES DAILY PRN
Qty: 120 TABLET | Refills: 0 | Status: SHIPPED | OUTPATIENT
Start: 2023-12-24 | End: 2023-11-16 | Stop reason: WASHOUT

## 2023-10-30 RX ORDER — OXYCODONE HYDROCHLORIDE 10 MG/1
10 TABLET ORAL 4 TIMES DAILY PRN
Qty: 120 TABLET | Refills: 0 | Status: SHIPPED | OUTPATIENT
Start: 2023-11-27 | End: 2023-11-16 | Stop reason: WASHOUT

## 2023-10-30 ASSESSMENT — ENCOUNTER SYMPTOMS
ENDOCRINE NEGATIVE: 1
DIZZINESS: 0
CARDIOVASCULAR NEGATIVE: 1
MYALGIAS: 1
GASTROINTESTINAL NEGATIVE: 1
BACK PAIN: 1
PSYCHIATRIC NEGATIVE: 1
ARTHRALGIAS: 1
TREMORS: 0
SPEECH DIFFICULTY: 0
FACIAL ASYMMETRY: 0
SEIZURES: 0
NUMBNESS: 0
HEADACHES: 0
CONSTITUTIONAL NEGATIVE: 1
WEAKNESS: 1
RESPIRATORY NEGATIVE: 1
HEMATOLOGIC/LYMPHATIC NEGATIVE: 1
ALLERGIC/IMMUNOLOGIC NEGATIVE: 1
LIGHT-HEADEDNESS: 0

## 2023-11-07 ENCOUNTER — OFFICE VISIT (OUTPATIENT)
Dept: PULMONOLOGY | Facility: CLINIC | Age: 63
End: 2023-11-07
Payer: MEDICARE

## 2023-11-07 VITALS
DIASTOLIC BLOOD PRESSURE: 65 MMHG | WEIGHT: 315 LBS | RESPIRATION RATE: 16 BRPM | SYSTOLIC BLOOD PRESSURE: 102 MMHG | HEART RATE: 80 BPM | BODY MASS INDEX: 48.14 KG/M2

## 2023-11-07 DIAGNOSIS — U09.9 POST COVID-19 CONDITION, UNSPECIFIED: Primary | ICD-10-CM

## 2023-11-07 DIAGNOSIS — J45.20 MILD INTERMITTENT ASTHMA, UNSPECIFIED WHETHER COMPLICATED (HHS-HCC): ICD-10-CM

## 2023-11-07 DIAGNOSIS — R09.02 HYPOXIA: ICD-10-CM

## 2023-11-07 DIAGNOSIS — R06.09 OTHER FORM OF DYSPNEA: ICD-10-CM

## 2023-11-07 DIAGNOSIS — G47.30 BREATHING-RELATED SLEEP DISORDER: ICD-10-CM

## 2023-11-07 DIAGNOSIS — Z99.81 ON HOME OXYGEN THERAPY: ICD-10-CM

## 2023-11-07 DIAGNOSIS — R06.00 DYSPNEA, UNSPECIFIED TYPE: ICD-10-CM

## 2023-11-07 PROCEDURE — 3074F SYST BP LT 130 MM HG: CPT | Performed by: INTERNAL MEDICINE

## 2023-11-07 PROCEDURE — 99213 OFFICE O/P EST LOW 20 MIN: CPT | Performed by: INTERNAL MEDICINE

## 2023-11-07 PROCEDURE — 1036F TOBACCO NON-USER: CPT | Performed by: INTERNAL MEDICINE

## 2023-11-07 PROCEDURE — 1159F MED LIST DOCD IN RCRD: CPT | Performed by: INTERNAL MEDICINE

## 2023-11-07 PROCEDURE — 1126F AMNT PAIN NOTED NONE PRSNT: CPT | Performed by: INTERNAL MEDICINE

## 2023-11-07 PROCEDURE — 1160F RVW MEDS BY RX/DR IN RCRD: CPT | Performed by: INTERNAL MEDICINE

## 2023-11-07 PROCEDURE — 3078F DIAST BP <80 MM HG: CPT | Performed by: INTERNAL MEDICINE

## 2023-11-07 RX ORDER — BUDESONIDE AND FORMOTEROL FUMARATE DIHYDRATE 160; 4.5 UG/1; UG/1
2 AEROSOL RESPIRATORY (INHALATION) 2 TIMES DAILY
Qty: 1 EACH | Refills: 11 | Status: SHIPPED | OUTPATIENT
Start: 2023-11-07

## 2023-11-07 ASSESSMENT — PATIENT HEALTH QUESTIONNAIRE - PHQ9
1. LITTLE INTEREST OR PLEASURE IN DOING THINGS: NOT AT ALL
2. FEELING DOWN, DEPRESSED OR HOPELESS: NOT AT ALL
SUM OF ALL RESPONSES TO PHQ9 QUESTIONS 1 AND 2: 0

## 2023-11-07 ASSESSMENT — ENCOUNTER SYMPTOMS
SLEEPING PROBLEMS DURING THE LAST MONTH: 1
FEVER: 0
NEUROLOGICAL NEGATIVE: 1
SHORTNESS OF BREATH: 1
CHILLS: 0
CARDIOVASCULAR NEGATIVE: 1
GASTROINTESTINAL NEGATIVE: 1
COUGH: 0
DEPRESSION: 0
PSYCHIATRIC NEGATIVE: 1

## 2023-11-07 ASSESSMENT — COLUMBIA-SUICIDE SEVERITY RATING SCALE - C-SSRS
2. HAVE YOU ACTUALLY HAD ANY THOUGHTS OF KILLING YOURSELF?: NO
6. HAVE YOU EVER DONE ANYTHING, STARTED TO DO ANYTHING, OR PREPARED TO DO ANYTHING TO END YOUR LIFE?: NO

## 2023-11-07 ASSESSMENT — PAIN SCALES - GENERAL: PAINLEVEL: 0-NO PAIN

## 2023-11-07 NOTE — PROGRESS NOTES
Subjective   Patient ID: Jayy Coles is a 63 y.o. male who presents for Sleeping Problem.  h/o asthma (+methacholine ), mod restriction 3/2022 previously admitted w/ COVID here for f/u. Was on 2-3L. Completed NE. CT-chest w/ stable GGOs. Still lhaving significant dyspnea.  No fevers, chills or cough.  O2 is set at 3-5L. LE edema has been improved. On Symbicort. Uses rescue 3x/week. ET is about 50 feet but limited by knee pain. HST showed severe sleep apnea.    Sleeping Problem  Pertinent negatives include no chills, coughing, fever or rash.       Review of Systems   Constitutional:  Negative for chills and fever.   Respiratory:  Positive for shortness of breath. Negative for cough.    Cardiovascular: Negative.    Gastrointestinal: Negative.    Skin:  Negative for rash.   Neurological: Negative.    Psychiatric/Behavioral: Negative.     All other systems reviewed and are negative.      Objective   Physical Exam  Vitals reviewed.   Constitutional:       Appearance: Normal appearance. He is obese.   HENT:      Head: Normocephalic and atraumatic.   Eyes:      Extraocular Movements: Extraocular movements intact.   Cardiovascular:      Rate and Rhythm: Normal rate and regular rhythm.      Heart sounds: Normal heart sounds.   Pulmonary:      Effort: Pulmonary effort is normal.      Breath sounds: Decreased breath sounds present.   Abdominal:      Palpations: Abdomen is soft.      Tenderness: There is no abdominal tenderness.   Musculoskeletal:      Cervical back: Normal range of motion.      Right lower le+ Pitting Edema present.      Left lower le+ Pitting Edema present.   Skin:     General: Skin is warm.   Neurological:      General: No focal deficit present.      Mental Status: He is alert and oriented to person, place, and time. Mental status is at baseline.   Psychiatric:         Mood and Affect: Mood normal.         Behavior: Behavior normal.         Assessment/Plan   Problem List Items Addressed This  Visit       Asthma     Mod restriction 3/2022. Cont Symbicort to 160mcg.          Dyspnea     Some cardiac component.. cont diuretics         On home oxygen therapy     Cont 3L.  Will order portable oxygen concentrator.         Post covid-19 condition, unspecified - Primary     Repeat CT-chest with stable GGOs. Repeat CT-chest in Jan. Completed AR.         Breathing-related sleep disorder     Severe RICCI - Will need titration study.         Relevant Orders    In-Center Sleep Study (Non-Sleep Provider Only)     Other Visit Diagnoses       Hypoxia        Relevant Orders    Home Oxygen Therapy          RTC in 2-3 months.

## 2023-11-08 NOTE — ASSESSMENT & PLAN NOTE
>>ASSESSMENT AND PLAN FOR ASTHMA WRITTEN ON 11/7/2023 10:00 PM BY MOOSE FITZGERALD MD    Mod restriction 3/2022. Cont Symbicort to 160mcg.     >>ASSESSMENT AND PLAN FOR DYSPNEA WRITTEN ON 11/7/2023 10:01 PM BY MOOSE FITZGERALD MD    Some cardiac component.. cont diuretics

## 2023-11-10 DIAGNOSIS — R06.00 DYSPNEA, UNSPECIFIED TYPE: ICD-10-CM

## 2023-11-16 ENCOUNTER — OFFICE VISIT (OUTPATIENT)
Dept: ENDOCRINOLOGY | Facility: CLINIC | Age: 63
End: 2023-11-16
Payer: MEDICARE

## 2023-11-16 VITALS
HEIGHT: 69 IN | HEART RATE: 71 BPM | BODY MASS INDEX: 46.65 KG/M2 | SYSTOLIC BLOOD PRESSURE: 133 MMHG | WEIGHT: 315 LBS | DIASTOLIC BLOOD PRESSURE: 71 MMHG

## 2023-11-16 DIAGNOSIS — E11.65 TYPE 2 DIABETES MELLITUS WITH HYPERGLYCEMIA, WITH LONG-TERM CURRENT USE OF INSULIN (MULTI): ICD-10-CM

## 2023-11-16 DIAGNOSIS — E11.42 TYPE 2 DIABETES MELLITUS WITH DIABETIC POLYNEUROPATHY, WITH LONG-TERM CURRENT USE OF INSULIN (MULTI): Primary | ICD-10-CM

## 2023-11-16 DIAGNOSIS — G89.29 CHRONIC PAIN OF LEFT KNEE: ICD-10-CM

## 2023-11-16 DIAGNOSIS — E78.2 MIXED HYPERLIPIDEMIA: ICD-10-CM

## 2023-11-16 DIAGNOSIS — I10 PRIMARY HYPERTENSION: ICD-10-CM

## 2023-11-16 DIAGNOSIS — M25.562 CHRONIC PAIN OF LEFT KNEE: ICD-10-CM

## 2023-11-16 DIAGNOSIS — Z79.4 TYPE 2 DIABETES MELLITUS WITH DIABETIC POLYNEUROPATHY, WITH LONG-TERM CURRENT USE OF INSULIN (MULTI): Primary | ICD-10-CM

## 2023-11-16 DIAGNOSIS — Z79.4 TYPE 2 DIABETES MELLITUS WITH HYPERGLYCEMIA, WITH LONG-TERM CURRENT USE OF INSULIN (MULTI): ICD-10-CM

## 2023-11-16 DIAGNOSIS — M17.0 PRIMARY OSTEOARTHRITIS OF BOTH KNEES: ICD-10-CM

## 2023-11-16 LAB — POC HEMOGLOBIN A1C: 9.2 % (ref 4.2–6.5)

## 2023-11-16 PROCEDURE — 1159F MED LIST DOCD IN RCRD: CPT | Performed by: NURSE PRACTITIONER

## 2023-11-16 PROCEDURE — 1036F TOBACCO NON-USER: CPT | Performed by: NURSE PRACTITIONER

## 2023-11-16 PROCEDURE — 3078F DIAST BP <80 MM HG: CPT | Performed by: NURSE PRACTITIONER

## 2023-11-16 PROCEDURE — 1160F RVW MEDS BY RX/DR IN RCRD: CPT | Performed by: NURSE PRACTITIONER

## 2023-11-16 PROCEDURE — 95251 CONT GLUC MNTR ANALYSIS I&R: CPT | Performed by: NURSE PRACTITIONER

## 2023-11-16 PROCEDURE — 99214 OFFICE O/P EST MOD 30 MIN: CPT | Performed by: NURSE PRACTITIONER

## 2023-11-16 PROCEDURE — 83036 HEMOGLOBIN GLYCOSYLATED A1C: CPT | Performed by: NURSE PRACTITIONER

## 2023-11-16 PROCEDURE — 1125F AMNT PAIN NOTED PAIN PRSNT: CPT | Performed by: NURSE PRACTITIONER

## 2023-11-16 PROCEDURE — 3075F SYST BP GE 130 - 139MM HG: CPT | Performed by: NURSE PRACTITIONER

## 2023-11-16 RX ORDER — SEMAGLUTIDE 1.34 MG/ML
1 INJECTION, SOLUTION SUBCUTANEOUS
Qty: 3 ML | Refills: 6 | Status: SHIPPED | OUTPATIENT
Start: 2023-11-16 | End: 2024-04-24

## 2023-11-16 RX ORDER — PREGABALIN 150 MG/1
150 CAPSULE ORAL 3 TIMES DAILY
Qty: 90 CAPSULE | Refills: 2 | Status: SHIPPED | OUTPATIENT
Start: 2023-11-16 | End: 2024-01-18 | Stop reason: SDUPTHER

## 2023-11-16 ASSESSMENT — ENCOUNTER SYMPTOMS: DEPRESSION: 0

## 2023-11-16 ASSESSMENT — PAIN SCALES - GENERAL: PAINLEVEL: 8

## 2023-11-16 NOTE — PATIENT INSTRUCTIONS
If your waking blood sugar is ABOVE 130-150 for three days in a row then increase your long acting insulin by 2 units every 3rd day until your waking bs is between 100-130     MAX DOSE 130 UNITS    INCREASE TOUJEO  UNITS    TRY TO DOSE MEALTIME INSULIN AT LEAST 15 MINUTES BEFORE YOU EAT

## 2023-11-16 NOTE — PROGRESS NOTES
Chief Complaint   Patient presents with    Diabetes         HPI:    Here for follow up/metabolic management 61 yo with DM Type 2 diagnosed age 43. History HTN, HLD, IBS, Afib with Covid 2021, HFpEF (Dr. Alix CHUNG) OA Knees, O2 night time use. Current A1C 9.2% was 9.0% (up to 12.2%). Patient testing sugars 4 times day. Following carb controlled diet somewhat and know reasonable carb allowances. Patient able to afford their medications. Patient is active/chronic pain.    Previous patient of Dr. Booker, has not been in the office for over 1 year ([id number]) due to several illnesses, post covid and knee surgery complications/infections.  -CGM Casandra 3 with phone kapil. Currently on insulin checking BS at least 4 xs a day adjustments made based on readings/frequent visits to manage.  -INSULIN Toujeo 98 units (wife decreased ?) Humalog ISF 20 >120         -Metformin stopped/GI intolerant, SGLT2 Jardiance (cannot afford) TZD (no due to CHF) GLP1 Ozempic 0.50mg  -HTN Metoprolol, Furosemide daily at goal  -STATIN Rosuvastatin 10 mg LDL 92 (has been taking Colestipol for IBS)           90 day Casandra report downloaded and attached 31% time in target, 0% lows, avg bs 209. Has been waking up with BS above 200, then the rest of the day he is above target. Not always dosing before he eats but is taking mealtime insulin.       Current Outpatient Medications:     Advair Diskus 100-50 mcg/dose diskus inhaler, Inhale 1 puff 2 times a day., Disp: , Rfl:     albuterol 90 mcg/actuation inhaler, 2 puffs every 4 hours if needed for shortness of breath or wheezing (cough)., Disp: , Rfl:     aspirin 81 mg EC tablet, Take 1 tablet (81 mg) by mouth once daily. With food, Disp: , Rfl:     biotin 1 mg tablet, Take 1 tablet (1,000 mcg) by mouth once daily., Disp: , Rfl:     blood-glucose sensor (FreeStyle Casandra 3 Sensor) device, Apply one sensor to the skin every 14 days to test blood sugars, Disp: 2 each, Rfl: 11    cephalexin (Keflex) 500 mg  capsule, Take 1 capsule (500 mg) by mouth 2 times a day., Disp: , Rfl:     cephalexin (Keflex) 500 mg capsule, Take 1 capsule (500 mg) by mouth 2 times a day., Disp: 60 capsule, Rfl: 2    colestipol (Colestid) 1 gram tablet, Take 2 tablets (2 g) by mouth 2 times a day., Disp: , Rfl:     cyclobenzaprine (Flexeril) 5 mg tablet, Take 1 tablet (5 mg) by mouth every 6 hours during the day., Disp: , Rfl:     dicyclomine (Bentyl) 10 mg capsule, Take 1 capsule (10 mg) by mouth 3 times a day., Disp: , Rfl:     docusate sodium (COLACE ORAL), Colace, Disp: , Rfl:     ferrous sulfate 325 (65 Fe) MG tablet, Take by mouth., Disp: , Rfl:     FreeStyle glucose monitoring (FreeStyle Lite Meter) kit, 1 each., Disp: , Rfl:     FreeStyle Lite Strips strip, 4 times a day., Disp: , Rfl:     furosemide (Lasix) 20 mg tablet, Take 2 tablets (40 mg) by mouth 2 times a day., Disp: , Rfl:     furosemide (Lasix) 20 mg tablet, Take 4 tablets (80 mg) by mouth 2 times a day., Disp: , Rfl:     HumaLOG KwikPen Insulin 100 unit/mL injection, Inject under the skin. Inject per SS before each meal. SS: Inject 25 units for BG  and add 1 unit per 25 above 120, Disp: , Rfl:     hyoscyamine ER (Levbid) 0.375 mg 12 hr tablet, Take 1 tablet (0.375 mg) by mouth 2 times a day., Disp: , Rfl:     insulin glargine (Toujeo Max Solostar, 2 unit dial,) 300 unit/mL (3 mL) injection, INJECT UP  UNITS SUBCUTANEOUSLY DAILY., Disp: 12 mL, Rfl: 5    Lactobacillus acidophilus (PROBIOTIC ACIDOPHILUS ORAL), once every 24 hours., Disp: , Rfl:     melatonin 10 mg tablet, Take 1 tablet (10 mg) by mouth as needed at bedtime., Disp: , Rfl:     metoprolol tartrate (Lopressor) 25 mg tablet, Take 1 tablet (25 mg) by mouth 2 times a day., Disp: 180 tablet, Rfl: 0    multivitamin capsule, Take 1 capsule by mouth once daily., Disp: , Rfl:     naloxone (Narcan) 4 mg/0.1 mL nasal spray, Administer into affected nostril(s)., Disp: , Rfl:     nystatin (Mycostatin) cream, APPLY  "AND RUB IN A THIN LAYER TOPICALLY TO THE AFFECTED AREA TWICE DAILY IN THE MORNING AND IN THE EVENING, Disp: , Rfl:     oxyCODONE (Roxicodone) 10 mg immediate release tablet, Take 1 tablet (10 mg) by mouth every 6 hours., Disp: , Rfl:     oxyCODONE (Roxicodone) 5 mg immediate release tablet, Take 2 tablets (10 mg) by mouth 4 times daily as needed., Disp: , Rfl:     Ozempic 0.25 mg or 0.5 mg (2 mg/3 mL) pen injector, INJECT 0.25 MG UNDER THE SKIN WEEKLY FOR 4 WEEKS THEN 0.50MG WEEKLY, Disp: , Rfl:     pen needle, diabetic (BD Ultra-Fine Short Pen Needle) 31 gauge x 5/16\" needle, Use as directed, Disp: , Rfl:     pregabalin (Lyrica) 150 mg capsule, Take 1 capsule (150 mg) by mouth 3 times a day., Disp: 90 capsule, Rfl: 2    rosuvastatin (Crestor) 10 mg tablet, Take 1 tablet (10 mg) by mouth once daily., Disp: , Rfl:     semaglutide (Ozempic) 1 mg/dose (4 mg/3 mL) pen injector, Inject 1 mg under the skin 1 (one) time per week., Disp: 3 mL, Rfl: 6    sodium hyaluronate (Durolane) 60 mg/3 mL injection, INJECT RIGHT KNEE 1 TIME; QTY 1 X 3 ML SYRINGE, Disp: 3 mL, Rfl: 0    Symbicort 160-4.5 mcg/actuation inhaler, Inhale 2 puffs 2 times a day., Disp: 1 each, Rfl: 11    Toujeo Max U-300 SoloStar 300 unit/mL (3 mL) injection, Inject under the skin once daily in the morning. UP  UNITS, Disp: , Rfl:     /71 (BP Location: Right arm, Patient Position: Sitting)   Pulse 71   Ht 1.753 m (5' 9\")   Wt 147 kg (324 lb 12.8 oz)   BMI 47.96 kg/m²      Past Medical History:   Diagnosis Date    Candidiasis of skin and nail 10/01/2018    Cutaneous candidiasis    CHF (congestive heart failure) (CMS/HCC)     Contact with and (suspected) exposure to covid-19 09/28/2021    Close exposure to COVID-19 virus    Duodenitis without bleeding 02/23/2016    Duodenitis    Encounter for other preprocedural examination 02/25/2021    Preoperative clearance    Hyperlipidemia     Hypertension     Hypothyroidism     Infection and inflammatory " reaction due to other internal joint prosthesis, initial encounter (CMS/HCC) 01/28/2021    Infection of total knee replacement    Laceration without foreign body, right lower leg, subsequent encounter 06/11/2018    Leg laceration, right, subsequent encounter    Other specified complication of vascular prosthetic devices, implants and grafts, initial encounter (CMS/HCC) 12/22/2020    PICC line infiltration    Personal history of other diseases of the circulatory system 12/15/2022    History of atrial fibrillation    Personal history of other endocrine, nutritional and metabolic disease 04/30/2015    History of diabetes mellitus    Personal history of other malignant neoplasm of large intestine     History of malignant neoplasm of colon    Personal history of other malignant neoplasm of stomach     History of malignant neoplasm of stomach    Personal history of other specified conditions 10/01/2018    History of dysuria    Personal history of other specified conditions 04/30/2015    History of edema    Type 2 diabetes mellitus with diabetic polyneuropathy (CMS/[facility])     Type 2 diabetes mellitus with foot ulcer (CODE) (CMS/HCC) 12/03/2021    Diabetic ulcer of toe of left foot associated with type 2 diabetes mellitus, limited to breakdown of skin    Unspecified abdominal pain 07/07/2021    Abdominal cramping          ROS:      CARDIOLOGY:          Lightheadedness denies.  Chest pain denies.  Leg edema denies.  Palpitations denies.         RESPIRATORY:          Cough denies.  Shortness of breath denies.  Wheezing denies.         GASTROENTEROLOGY:          Abdominal pain denies.  Constipation denies.  Diarrhea denies.  Heartburn denies.         ENDOCRINOLOGY:          Cold intolerance denies.  Excessive sweating denies.  Heat intolerance denies.  Tremulousness denies.         NEUROLOGY:          Burning pain in feet denies.  Burning pain in hands denies.         PSYCHOLOGY:          Low energy denies.  Irritability denies.   Sleep disturbances denies.           Examination:     General Examination:         GENERAL APPEARANCE:  Pleasant and cooperative,No Acute Distress.          NECK: no lymphadenopathy,no thyromegaly, no dominant thyroid nodules.          HEART: no murmurs, click or rubs, regular rate and rhythm, normal S1S2.          LUNGS: clear to auscultation bilaterally, no wheezes/rhonchi/rales.          EXTREMITIES: no edema, 2+ pulses           Lab Results   Component Value Date    TSH 4.03 (H) 08/15/2023       Chemistry    Lab Results   Component Value Date/Time     08/15/2023 0814    K 3.9 08/15/2023 0814    CL 99 08/15/2023 0814    CO2 28 08/15/2023 0814    BUN 24 (H) 08/15/2023 0814    CREATININE 1.01 08/15/2023 0814    Lab Results   Component Value Date/Time    CALCIUM 9.1 08/15/2023 0814    ALKPHOS 93 08/15/2023 0814    AST 20 08/15/2023 0814    ALT 25 08/15/2023 0814    BILITOT 0.5 08/15/2023 0814          Lab Results   Component Value Date    WBC 11.4 (H) 08/15/2023    HGB 17.3 08/15/2023    HCT 53.6 (H) 08/15/2023    MCV 93 08/15/2023     08/15/2023     Lab Results   Component Value Date    HGBA1C 9.2 (A) 11/16/2023       1. Type 2 diabetes mellitus with diabetic polyneuropathy, with long-term current use of insulin (CMS/[facility])  -still uncontrolled DM, had cortisone injection for knee  -basal dose increased with instruction on how to adjust based on waking BS  -may need bolus dosing increased at follow up  -increased GLP1 will continue with medication if affordable, given [place] [place] application    - POCT glycosylated hemoglobin (Hb A1C) manually resulted  - semaglutide (Ozempic) 1 mg/dose (4 mg/3 mL) pen injector; Inject 1 mg under the skin 1 (one) time per week.  Dispense: 3 mL; Refill: 6    2. Type 2 diabetes mellitus with hyperglycemia, with long-term current use of insulin (CMS/HCC)  -reviewed target BS  -reviewed allowable carbs per meal/snack    3. Mixed hyperlipidemia  -tolerates statin   -LDL target <  70    - Lipid Panel Non-Fasting; Future    4. Primary hypertension  -at target       Follow up 3 months CNP    -previous labs and notes reviewed  -labs/tests mentioned in above note reviewed and interpreted  -reviewed and counseled on medication monitoring and side effects.

## 2023-12-07 DIAGNOSIS — R19.7 DIARRHEA, UNSPECIFIED: Primary | ICD-10-CM

## 2023-12-14 ENCOUNTER — OFFICE VISIT (OUTPATIENT)
Dept: CARDIOLOGY | Facility: CLINIC | Age: 63
End: 2023-12-14
Payer: MEDICARE

## 2023-12-14 VITALS
HEART RATE: 72 BPM | HEIGHT: 69 IN | DIASTOLIC BLOOD PRESSURE: 64 MMHG | OXYGEN SATURATION: 95 % | BODY MASS INDEX: 46.65 KG/M2 | WEIGHT: 315 LBS | SYSTOLIC BLOOD PRESSURE: 121 MMHG

## 2023-12-14 DIAGNOSIS — I25.10 CORONARY ARTERY CALCIFICATION SEEN ON CT SCAN: ICD-10-CM

## 2023-12-14 DIAGNOSIS — I71.21 ANEURYSM OF ASCENDING AORTA WITHOUT RUPTURE (CMS-HCC): Primary | ICD-10-CM

## 2023-12-14 DIAGNOSIS — I50.30 HEART FAILURE WITH PRESERVED LEFT VENTRICULAR FUNCTION (HFPEF) (MULTI): ICD-10-CM

## 2023-12-14 DIAGNOSIS — E78.5 HYPERLIPIDEMIA, UNSPECIFIED HYPERLIPIDEMIA TYPE: ICD-10-CM

## 2023-12-14 DIAGNOSIS — I70.0 ATHEROSCLEROSIS OF AORTA (CMS-HCC): ICD-10-CM

## 2023-12-14 PROBLEM — I10 PRIMARY HYPERTENSION: Status: RESOLVED | Noted: 2023-09-24 | Resolved: 2023-12-14

## 2023-12-14 PROCEDURE — 3078F DIAST BP <80 MM HG: CPT | Performed by: INTERNAL MEDICINE

## 2023-12-14 PROCEDURE — 1036F TOBACCO NON-USER: CPT | Performed by: INTERNAL MEDICINE

## 2023-12-14 PROCEDURE — 1125F AMNT PAIN NOTED PAIN PRSNT: CPT | Performed by: INTERNAL MEDICINE

## 2023-12-14 PROCEDURE — 99214 OFFICE O/P EST MOD 30 MIN: CPT | Performed by: INTERNAL MEDICINE

## 2023-12-14 PROCEDURE — 99214 OFFICE O/P EST MOD 30 MIN: CPT | Mod: PO | Performed by: INTERNAL MEDICINE

## 2023-12-14 PROCEDURE — 1159F MED LIST DOCD IN RCRD: CPT | Performed by: INTERNAL MEDICINE

## 2023-12-14 PROCEDURE — 1160F RVW MEDS BY RX/DR IN RCRD: CPT | Performed by: INTERNAL MEDICINE

## 2023-12-14 PROCEDURE — 3074F SYST BP LT 130 MM HG: CPT | Performed by: INTERNAL MEDICINE

## 2023-12-14 RX ORDER — FUROSEMIDE 80 MG/1
80 TABLET ORAL 2 TIMES DAILY
Qty: 180 TABLET | Refills: 3 | Status: SHIPPED | OUTPATIENT
Start: 2023-12-14 | End: 2024-03-14 | Stop reason: SDUPTHER

## 2023-12-14 ASSESSMENT — ENCOUNTER SYMPTOMS
DEPRESSION: 0
NAUSEA: 0
MYALGIAS: 0
DYSURIA: 0
HEMOPTYSIS: 0
HEMATURIA: 0
VOMITING: 0
BLOATING: 0
MEMORY LOSS: 0
ALTERED MENTAL STATUS: 0
WHEEZING: 0
FEVER: 0
COUGH: 0
HEADACHES: 0
CONSTIPATION: 0
ABDOMINAL PAIN: 0
DIARRHEA: 0
CHILLS: 0
FALLS: 0

## 2023-12-14 NOTE — PROGRESS NOTES
Chief complaint:  Follow-up (4 month followup )     HPI  62 yo WM w/ h/o AFIB x1 (10/21 during COVID), HFpEF, mild CAC, athero of Ao, mild AsAo aneurysm, DM, IBS now here for cardiology f/u. He has long h/o occ R pinpoint CP x days that resolves w/ chiropractic manipulation. No other CP. No dyspnea at rest. +ch PERSAUD, improved w/ Lasix. No orthopnea/PND. No palps. +occ brief LH on standing up. No syncope. No cough. +ch LE edema, improved w/ Lasix.   ECG 12/20: ST (109), [facility]  ECG 10/21: SR (92), [facility]  ECG 10/21: AFIB (125)  ECG 10/21: AFIB (70), [facility]  ECG 10/22: SR (74), [facility]  Echo 10/20: EF 55-60%, mild LAE, mild TR, PASP 47  Echo 10/22: TDS, EF 55-60%, DD, ?nl LA, valves ok  CXR 3/23: no further airspace dz, some gen int prom remains (?CILD)  CTA chest 6/21: no PE  CT chest 12/21: mild cor calc, nl heart size, no peric eff, min athero of Ao, no aneurysm  CT chest 12/22: AsAo 4.2cm, athero of Ao + branches, stable CM, no peric eff, mild prom PA, stable faint GGOs  PFT 3/22: restriction, mod red DLCO  CT ab 10/22: athero of Ao + branches, no AAA, nl IVC  LE US 12/20: no DVT     Review of Systems   Constitutional: Negative for chills, fever and malaise/fatigue.   HENT:  Negative for hearing loss.    Eyes:  Negative for visual disturbance.   Respiratory:  Negative for cough, hemoptysis and wheezing.    Skin:  Negative for rash.   Musculoskeletal:  Negative for falls and myalgias.   Gastrointestinal:  Negative for bloating, abdominal pain, constipation, diarrhea, dysphagia, nausea and vomiting.   Genitourinary:  Negative for dysuria and hematuria.   Neurological:  Negative for headaches.   Psychiatric/Behavioral:  Negative for altered mental status, depression and memory loss.         Social History     Tobacco Use    Smoking status: Never     Passive exposure: Never    Smokeless tobacco: Never   Substance Use Topics    Alcohol use: Yes     Comment: rare        Family History   Problem Relation Name Age of Onset    Stroke  "Mother      Dementia Mother      Pneumonia Mother      Heart disease Father      Diabetes Father      Cancer Father      Arthritis Father      Skin cancer Father      Melanoma Father      Gout Father      Heart attack Father      Other (Cardiac disorder) Father          Allergies   Allergen Reactions    Terbinafine Unknown    Penicillins Rash     \"red and hot\"        Current Outpatient Medications   Medication Instructions    albuterol 90 mcg/actuation inhaler 2 puffs, Every 4 hours PRN    blood-glucose sensor (FreeStyle Casandra 3 Sensor) device Apply one sensor to the skin every 14 days to test blood sugars    cephalexin (KEFLEX) 500 mg, oral, 2 times daily    colestipol (COLESTID) 2 g, oral, Daily    cyclobenzaprine (Flexeril) 5 mg tablet 1 tablet, oral, Every 6 hours during day    dicyclomine (Bentyl) 10 mg capsule 1 capsule, oral, 3 times daily    FreeStyle glucose monitoring (FreeStyle Lite Meter) kit 1 each    FreeStyle Lite Strips strip 4 times daily    furosemide (LASIX) 80 mg, oral, 2 times daily    HumaLOG KwikPen Insulin 100 unit/mL injection subcutaneous, Inject per SS before each meal. SS: Inject 25 units for BG  and add 1 unit per 25 above 120    insulin glargine (Toujeo Max Solostar, 2 unit dial,) 300 unit/mL (3 mL) injection INJECT UP  UNITS SUBCUTANEOUSLY DAILY.    metoprolol tartrate (LOPRESSOR) 25 mg, oral, 2 times daily    naloxone (Narcan) 4 mg/0.1 mL nasal spray nasal    nystatin (Mycostatin) cream APPLY AND RUB IN A THIN LAYER TOPICALLY TO THE AFFECTED AREA TWICE DAILY IN THE MORNING AND IN THE EVENING    oxyCODONE (Roxicodone) 10 mg immediate release tablet 1 tablet, oral, Every 6 hours    Ozempic 1 mg, subcutaneous, Weekly    pen needle, diabetic (BD Ultra-Fine Short Pen Needle) 31 gauge x 5/16\" needle Use as directed    pregabalin (LYRICA) 150 mg, oral, 3 times daily    rosuvastatin (Crestor) 10 mg tablet Take 1 tablet (10 mg) by mouth once daily.    sodium hyaluronate (Durolane) " 60 mg/3 mL injection INJECT RIGHT KNEE 1 TIME; QTY 1 X 3 ML SYRINGE    Symbicort 160-4.5 mcg/actuation inhaler 2 puffs, inhalation, 2 times daily    Toujeo Max U-300 SoloStar 300 unit/mL (3 mL) injection subcutaneous, Every morning, UP  UNITS        Vitals:    12/14/23 0926   BP: 121/64   Pulse: 72   SpO2: 95%        Physical Exam  Constitutional:       Appearance: Normal appearance.   HENT:      Head: Normocephalic and atraumatic.      Nose: Nose normal.   Neck:      Vascular: No carotid bruit.   Cardiovascular:      Rate and Rhythm: Normal rate and regular rhythm.      Heart sounds: No murmur heard.  Pulmonary:      Effort: Pulmonary effort is normal.      Breath sounds: Normal breath sounds.   Abdominal:      Palpations: Abdomen is soft.      Tenderness: There is no abdominal tenderness.   Musculoskeletal:      Right lower leg: Edema present.      Left lower leg: Edema present.      Comments: Tr LE edema   Skin:     General: Skin is warm and dry.   Neurological:      General: No focal deficit present.      Mental Status: He is alert.   Psychiatric:         Mood and Affect: Mood normal.         Judgment: Judgment normal.          Lab Results   Component Value Date    CR 1.01      HGB 17.3      K 3.9      MG 2.3     BNP 38            LDL 92     TSH 4.03      Assessment/Plan   62 yo WM w/ h/o AFIB x1 (10/21 during COVID), HFpEF, mild CAC, athero of Ao, mild AsAo aneurysm, DM, IBS. Doing fair. Appears near compensated (or mildly decomp). He is content and prefers not to increase Lasix.  -continue ASA 81 qd (with food)  -continue Metoprolol 25 bid  -continue Lasix 80 bid; high K diet  -consider statin; LDL 92 on Colestipol (using for IBS) and he already has many pains  -continue Jardiance 25 qd  -f/u 6 months (earlier if needed)     Marcos Thomson MD

## 2023-12-28 DIAGNOSIS — M25.561 CHRONIC PAIN OF RIGHT KNEE: ICD-10-CM

## 2023-12-28 DIAGNOSIS — Z96.652 STATUS POST REVISION OF TOTAL REPLACEMENT OF LEFT KNEE: ICD-10-CM

## 2023-12-28 DIAGNOSIS — M17.0 OSTEOARTHRITIS OF BOTH KNEES, UNSPECIFIED OSTEOARTHRITIS TYPE: ICD-10-CM

## 2023-12-28 DIAGNOSIS — M25.562 CHRONIC PAIN OF LEFT KNEE: ICD-10-CM

## 2023-12-28 DIAGNOSIS — G89.29 CHRONIC PAIN OF RIGHT KNEE: ICD-10-CM

## 2023-12-28 DIAGNOSIS — G89.29 CHRONIC PAIN OF LEFT KNEE: ICD-10-CM

## 2023-12-29 RX ORDER — OXYCODONE HYDROCHLORIDE 10 MG/1
10 TABLET ORAL 4 TIMES DAILY PRN
Qty: 112 TABLET | Refills: 0 | Status: SHIPPED | OUTPATIENT
Start: 2023-12-29 | End: 2024-01-18 | Stop reason: SDUPTHER

## 2024-01-04 DIAGNOSIS — Z79.4 TYPE 2 DIABETES MELLITUS WITH DIABETIC POLYNEUROPATHY, WITH LONG-TERM CURRENT USE OF INSULIN (MULTI): Primary | ICD-10-CM

## 2024-01-04 DIAGNOSIS — E11.42 TYPE 2 DIABETES MELLITUS WITH DIABETIC POLYNEUROPATHY, WITH LONG-TERM CURRENT USE OF INSULIN (MULTI): Primary | ICD-10-CM

## 2024-01-04 RX ORDER — INSULIN GLARGINE 300 U/ML
INJECTION, SOLUTION SUBCUTANEOUS
Qty: 36 ML | Refills: 3 | Status: SHIPPED | OUTPATIENT
Start: 2024-01-04

## 2024-01-05 ENCOUNTER — TELEPHONE (OUTPATIENT)
Dept: ORTHOPEDIC SURGERY | Facility: CLINIC | Age: 64
End: 2024-01-05
Payer: MEDICARE

## 2024-01-05 DIAGNOSIS — Z96.652 STATUS POST REVISION OF TOTAL REPLACEMENT OF LEFT KNEE: Primary | ICD-10-CM

## 2024-01-05 NOTE — TELEPHONE ENCOUNTER
10/12/23 rt knee pain  Patient needs refill on Cephalexin 500mg capsule.    Pharmacy: [facility], [place]

## 2024-01-08 RX ORDER — CEPHALEXIN 500 MG/1
500 CAPSULE ORAL 2 TIMES DAILY
Qty: 60 CAPSULE | Refills: 2 | Status: SHIPPED | OUTPATIENT
Start: 2024-01-08 | End: 2024-04-08 | Stop reason: SDUPTHER

## 2024-01-18 ENCOUNTER — OFFICE VISIT (OUTPATIENT)
Dept: PAIN MEDICINE | Facility: CLINIC | Age: 64
End: 2024-01-18
Payer: MEDICARE

## 2024-01-18 VITALS — DIASTOLIC BLOOD PRESSURE: 66 MMHG | RESPIRATION RATE: 18 BRPM | HEART RATE: 62 BPM | SYSTOLIC BLOOD PRESSURE: 105 MMHG

## 2024-01-18 DIAGNOSIS — M16.11 PRIMARY OSTEOARTHRITIS OF RIGHT HIP: ICD-10-CM

## 2024-01-18 DIAGNOSIS — M17.0 PRIMARY OSTEOARTHRITIS OF BOTH KNEES: Primary | ICD-10-CM

## 2024-01-18 PROCEDURE — 3074F SYST BP LT 130 MM HG: CPT | Performed by: NURSE PRACTITIONER

## 2024-01-18 PROCEDURE — 1125F AMNT PAIN NOTED PAIN PRSNT: CPT | Performed by: NURSE PRACTITIONER

## 2024-01-18 PROCEDURE — 99214 OFFICE O/P EST MOD 30 MIN: CPT | Performed by: NURSE PRACTITIONER

## 2024-01-18 PROCEDURE — 3078F DIAST BP <80 MM HG: CPT | Performed by: NURSE PRACTITIONER

## 2024-01-18 PROCEDURE — 1036F TOBACCO NON-USER: CPT | Performed by: NURSE PRACTITIONER

## 2024-01-18 PROCEDURE — 1159F MED LIST DOCD IN RCRD: CPT | Performed by: NURSE PRACTITIONER

## 2024-01-18 PROCEDURE — 1160F RVW MEDS BY RX/DR IN RCRD: CPT | Performed by: NURSE PRACTITIONER

## 2024-01-18 RX ORDER — OXYCODONE HYDROCHLORIDE 10 MG/1
10 TABLET ORAL 4 TIMES DAILY PRN
Qty: 112 TABLET | Refills: 0 | Status: SHIPPED | OUTPATIENT
Start: 2024-02-23 | End: 2024-04-17 | Stop reason: SDUPTHER

## 2024-01-18 RX ORDER — OXYCODONE HYDROCHLORIDE 10 MG/1
10 TABLET ORAL 4 TIMES DAILY PRN
Qty: 112 TABLET | Refills: 0 | Status: SHIPPED | OUTPATIENT
Start: 2024-01-26 | End: 2024-04-17 | Stop reason: SDUPTHER

## 2024-01-18 RX ORDER — BACLOFEN 10 MG/1
10 TABLET ORAL 3 TIMES DAILY
Qty: 90 TABLET | Refills: 0 | Status: SHIPPED | OUTPATIENT
Start: 2024-01-18 | End: 2024-03-25 | Stop reason: SDUPTHER

## 2024-01-18 RX ORDER — OXYCODONE HYDROCHLORIDE 10 MG/1
10 TABLET ORAL 4 TIMES DAILY PRN
Qty: 112 TABLET | Refills: 0 | Status: SHIPPED | OUTPATIENT
Start: 2024-03-22 | End: 2024-04-19

## 2024-01-18 RX ORDER — PREGABALIN 150 MG/1
150 CAPSULE ORAL 3 TIMES DAILY
Qty: 90 CAPSULE | Refills: 2 | Status: SHIPPED | OUTPATIENT
Start: 2024-02-16 | End: 2024-04-17 | Stop reason: SDUPTHER

## 2024-01-18 RX ORDER — DICLOFENAC SODIUM 10 MG/G
4 GEL TOPICAL 4 TIMES DAILY PRN
Qty: 100 G | Refills: 2 | Status: SHIPPED | OUTPATIENT
Start: 2024-01-18 | End: 2024-03-21

## 2024-01-18 ASSESSMENT — ENCOUNTER SYMPTOMS
RESPIRATORY NEGATIVE: 1
WEAKNESS: 1
JOINT SWELLING: 1
NECK STIFFNESS: 0
HEMATOLOGIC/LYMPHATIC NEGATIVE: 1
CONSTITUTIONAL NEGATIVE: 1
CARDIOVASCULAR NEGATIVE: 1
BACK PAIN: 1
DIZZINESS: 0
MYALGIAS: 1
GASTROINTESTINAL NEGATIVE: 1
ARTHRALGIAS: 1
LIGHT-HEADEDNESS: 0
NUMBNESS: 0
SEIZURES: 0
SPEECH DIFFICULTY: 0
ENDOCRINE NEGATIVE: 1
NECK PAIN: 0
FACIAL ASYMMETRY: 0
EYES NEGATIVE: 1
PSYCHIATRIC NEGATIVE: 1
ALLERGIC/IMMUNOLOGIC NEGATIVE: 1
HEADACHES: 0
TREMORS: 0

## 2024-01-18 ASSESSMENT — PAIN SCALES - GENERAL: PAINLEVEL: 3

## 2024-01-18 NOTE — PROGRESS NOTES
Subjective   Patient ID: Jayy Coles is a 63 y.o. male who presents for chronic pain management.    KASHIF Hope follows up for interval reevaluation of his chronic left knee pain and is status post surgeries x7 to the knee. He is with chronic knee pain of aching and stiffness but also with burning sharp pain to the knee. Does have weakness. No falls since last office visit.    Is with increased low back pain over the last 6 weeks and radiating left posterolateral hip pain with muscle spasm into foot.  Muscle spasm can be very difficult to control as it forces him to get up out of bed and walk.  To help with his low back pain and muscle spasm added baclofen 10 mg as he needs in the evening.    History of right intra-articular injection from orthopedic surgeon September 15, 2023 help to reduce pain and improve function up to 50%.      Oxycodone 10 mg 4 times a day along with Lyrica 150 mg 3 times a day helps to reduce pain and improve function to the left knee up to 80% for several hours. Average pain score is 2 to 3 out of 10 with medication. Denies negative side effects from medication.     Has an average quality of family and social life with current condition and treatment. Has not been to the ED for pain since last office visit.     Toxicology consistent February 7, 2023.  Toxicology today.  Annual controlled substance agreement and opioid risk tool are completed and scanned into the chart January 18, 2024.    For continuity:   Given the patient's report of reduced pain and improved functional ability without adverse effects, it is reasonable to treat with narcotic medications. The terms of the opioid agreement as well as the potential risks and adverse effects of the patient's medication regimen were discussed in detail. This includes if applicable due to dosage of medication permission to discuss and coordinate care with other treatment providers relevant to the patients condition. The patient verbalized  understanding.     Risks and side effects of chronic opioid therapy including but not limited to tolerance, dependence, constipation, hyperalgesia, cognitive side effects, addiction and possible death due to overuse and or misuse were discussed. I also discussed that such medications when co-administered with other sedative agents including but not limited to alcohol, benzodiazepines, sedative hypnotics and illegal drugs could pose life threatening consequences including death. I also explained the impact that the administration of such medication has on a patient with obstructive sleep apnea and continued recommendations for use of apnea devices if ordered are prescribed by other physicians. In order to effectively and safely treat the pain, I also emphasized the importance of compliance with the treatment plan, as well as compliance with the terms of the opioid agreement, which was reviewed in detail. I explained the importance of being responsible with the medications and to take these only as prescribed, never in excess and never for reasons other than pain reduction. The patient was counseled on keeping the medications safe and locked away from children and other adults as well as disposal methods and options. The patient understood the risks and instructions.     I also discussed with the patient in detail that based on the clinical response to the opioid medications and improvements of activities of daily living, sleep, and work performance in light of compliance with the treatment plan we can continue this form of therapy for the above chronic pain. The goal and rationale used for current treatment with chronic opioid medication is to control the pain and alleviate disability induced by the chronic pain condition noted above after failures of other non-opioid and nonpharmacological modalities to treat the chronic pain and the symptoms associated with have failed. The patient understood the goals in terms of  the above treatment plan and had no further questions prior to leaving the office today.     Of note, the above-mentioned diagnoses/conditions and expected fluctuating nature of pain, and pain characteristic changes may lead to prolonged functional impairment requiring frequent and multiple reassessments with continued high level medical decision making. As noted, medication and medication management may require opiate therapy in excess of a routine less than 30 day medication requirement. The patient may require daily opiate therapy necessitating month-long prescription medication as noted above in order to perform activities of daily living and achieve acceptable quality of life with respect to their chronic pain condition for the foreseeable future. We monitor our patient's carefully through drug monitoring, medication counts, urine drug testing specific to their medication as well as a myriad of other substances and with frequent follow-ups with interval reassement of the chronic pain condition, its pathophysiology and prognosis.     The level of clinical decision making at this office visit is high due to high risks and complications including mortality and morbidity related to acute and chronic pain with respects to life, bodily function, and treatment. Risks and clinical decisions with respect to under treatment, failure to maintain adequate treatment, and/or overtreatment complications and outcomes were discussed with the patient with respect to their chronic pain conditions, interventional therapies, as well as the use of various medications including possible controlled/dangerous medications. The amount and complexity of reviewed data at this in subsequent office visits is high given patient's fluctuating clinical presentation, laboratory and radiographic reports, prescription monitoring program data, and medication history as well as other relevant data as noted above. Pertinent negatives and positives  data was used in consideration for the above-mentioned high complexity.      Given the patient's total MED, general use of daily opiates, or other coadministered medications in various classes the patient was offered a prescription for Narcan. I instructed the patient that it is important that patient fill this medication in order to demonstrate understanding of the gravity of possible side effects including respiratory depression and risk of overdose of this opiate load or medication combination. As such patient will be required to bring Narcan prescription to follow-up appointments as part of compliance with continued opiate care.     With respect to opiate induced constipation I discussed multiple ways to combat this problem including staying hydrated and taking over-the-counter medications such as Dulcolax, Miralax and Senna. If these treatments are not effective we could consider such medications as Amitiza, Linzess and Movantik.     Disclaimer: This note was transcribed using an audio transcription device. As such, minor errors may be present with regard to spelling, punctuation, and inadvertent word insertion. Please disregard such errors.    Results/Data  Xray Knee Complete 4 or more View [id number] 10:53AM Carl Mosquera      Test Name Result Flag Reference   Xray Knee Complete 4 or more View (Report)       FINAL REPORT  Interpreted by: DAPHNE PATHAK CHRISTOPHER, MD   05/12/23 08:11  MRN: [id number]  Patient Name: LAWRENCE VILLALOBOS     STUDY:  KNEE; COMPLT, 4 OR MORE VIEWS     INDICATION:  AP WB bilat in one shot , PA 30 degree flex WB bilat in one shot (no  orthoball), LAT merchant view 30 degree flex bilat in one shot (no  orthoball) M25.562: Chronic pain of left knee G89.29:.     COMPARISON:  May 5, 2022     ACCESSION NUMBER(S):  [id number]     ORDERING CLINICIAN:  CARL MOSQUERA     FINDINGS:  Long-stem revision left total knee arthroplasty. Appearance is  unchanged from prior examination. Prior medial  femoral condylar  fracture unchanged. No new lucencies or malalignment.     IMPRESSION:  Unchanged appearance of the long-stem revision left total knee  arthroplasty without evidence of new complication.     Electronically signed by: DAPHNE PATHAK  05/12/23 08:11       Review of Systems   Constitutional: Negative.    HENT: Negative.     Eyes: Negative.    Respiratory: Negative.          Awaiting order for portable oxygen machine from insurance.  Is with shortness of breath during exertion such as walking and getting up around about.   Cardiovascular: Negative.    Gastrointestinal: Negative.    Endocrine: Negative.    Genitourinary: Negative.    Musculoskeletal:  Positive for arthralgias, back pain, gait problem, joint swelling and myalgias. Negative for neck pain and neck stiffness.   Skin: Negative.    Allergic/Immunologic: Negative.    Neurological:  Positive for weakness. Negative for dizziness, tremors, seizures, syncope, facial asymmetry, speech difficulty, light-headedness, numbness and headaches.   Hematological: Negative.    Psychiatric/Behavioral: Negative.         Objective   Physical Exam  Vitals and nursing note reviewed.   Constitutional:       Appearance: Normal appearance.   HENT:      Head: Normocephalic and atraumatic.   Eyes:      Conjunctiva/sclera: Conjunctivae normal.   Cardiovascular:      Pulses: Normal pulses.   Pulmonary:      Effort: Pulmonary effort is normal. No respiratory distress.      Comments: Wearing portable oxygen 2 L nasal cannula.  Musculoskeletal:      Right lower leg: Edema present.      Left lower leg: Edema present.      Comments: Trace to +1 pitting edema to ankles and pretibial areas.    Right knee with pain during extension and flexion.  Joint line tenderness to palpation.    Right knee with generalized mild swelling.    Left knee with pain during extension and flexion.  Joint line tenderness to palpation.    Pain with palpation over the well-healed left knee linear  scar.    Generalized mild swelling.    Ambulates with mildly antalgic gait.   Skin:     General: Skin is warm and dry.      Capillary Refill: Capillary refill takes 2 to 3 seconds.   Neurological:      Mental Status: He is alert and oriented to person, place, and time.      Cranial Nerves: Cranial nerve deficit present.      Sensory: Sensory deficit present.      Motor: Weakness present.      Gait: Gait abnormal.   Psychiatric:         Mood and Affect: Mood normal.         Behavior: Behavior normal.       Assessment/Plan   Problem List Items Addressed This Visit    None  Follow-up 12 weeks.    Reviewed and approved by MALLIKA NEIL on 1/18/24 at 8:56 AM.

## 2024-01-24 DIAGNOSIS — R06.02 SHORTNESS OF BREATH: ICD-10-CM

## 2024-01-29 DIAGNOSIS — I10 PRIMARY HYPERTENSION: ICD-10-CM

## 2024-01-29 RX ORDER — METOPROLOL TARTRATE 25 MG/1
25 TABLET, FILM COATED ORAL 2 TIMES DAILY
Qty: 180 TABLET | Refills: 0 | Status: SHIPPED | OUTPATIENT
Start: 2024-01-29 | End: 2024-04-28

## 2024-01-31 DIAGNOSIS — E78.2 MIXED HYPERLIPIDEMIA: ICD-10-CM

## 2024-01-31 DIAGNOSIS — Z79.4 TYPE 2 DIABETES MELLITUS WITH DIABETIC POLYNEUROPATHY, WITH LONG-TERM CURRENT USE OF INSULIN (MULTI): Primary | ICD-10-CM

## 2024-01-31 DIAGNOSIS — E11.42 TYPE 2 DIABETES MELLITUS WITH DIABETIC POLYNEUROPATHY, WITH LONG-TERM CURRENT USE OF INSULIN (MULTI): Primary | ICD-10-CM

## 2024-01-31 RX ORDER — INSULIN LISPRO 100 [IU]/ML
INJECTION, SOLUTION INTRAVENOUS; SUBCUTANEOUS
Qty: 105 ML | Refills: 3 | Status: SHIPPED | OUTPATIENT
Start: 2024-01-31

## 2024-02-01 RX ORDER — ROSUVASTATIN CALCIUM 10 MG/1
10 TABLET, COATED ORAL DAILY
Qty: 90 TABLET | Refills: 3 | Status: SHIPPED | OUTPATIENT
Start: 2024-02-01

## 2024-02-09 DIAGNOSIS — R91.8 GROUND GLASS OPACITY PRESENT ON IMAGING OF LUNG: ICD-10-CM

## 2024-02-12 ENCOUNTER — APPOINTMENT (OUTPATIENT)
Dept: RADIOLOGY | Facility: HOSPITAL | Age: 64
End: 2024-02-12
Payer: MEDICARE

## 2024-02-12 ENCOUNTER — HOSPITAL ENCOUNTER (OUTPATIENT)
Dept: RADIOLOGY | Facility: HOSPITAL | Age: 64
Discharge: HOME | End: 2024-02-12
Payer: MEDICARE

## 2024-02-12 DIAGNOSIS — R91.8 GROUND GLASS OPACITY PRESENT ON IMAGING OF LUNG: ICD-10-CM

## 2024-02-12 PROCEDURE — 71250 CT THORAX DX C-: CPT

## 2024-02-13 ENCOUNTER — OFFICE VISIT (OUTPATIENT)
Dept: PULMONOLOGY | Facility: CLINIC | Age: 64
End: 2024-02-13
Payer: MEDICARE

## 2024-02-13 VITALS
BODY MASS INDEX: 47.99 KG/M2 | RESPIRATION RATE: 24 BRPM | HEART RATE: 70 BPM | SYSTOLIC BLOOD PRESSURE: 147 MMHG | OXYGEN SATURATION: 90 % | WEIGHT: 315 LBS | DIASTOLIC BLOOD PRESSURE: 74 MMHG

## 2024-02-13 DIAGNOSIS — R91.8 GROUND GLASS OPACITY PRESENT ON IMAGING OF LUNG: ICD-10-CM

## 2024-02-13 DIAGNOSIS — Z99.81 ON HOME OXYGEN THERAPY: ICD-10-CM

## 2024-02-13 DIAGNOSIS — J45.909 UNCOMPLICATED ASTHMA, UNSPECIFIED ASTHMA SEVERITY, UNSPECIFIED WHETHER PERSISTENT (HHS-HCC): Primary | ICD-10-CM

## 2024-02-13 DIAGNOSIS — G47.30 BREATHING-RELATED SLEEP DISORDER: ICD-10-CM

## 2024-02-13 DIAGNOSIS — U09.9 POST COVID-19 CONDITION, UNSPECIFIED: ICD-10-CM

## 2024-02-13 PROCEDURE — 1036F TOBACCO NON-USER: CPT | Performed by: INTERNAL MEDICINE

## 2024-02-13 PROCEDURE — 99213 OFFICE O/P EST LOW 20 MIN: CPT | Performed by: INTERNAL MEDICINE

## 2024-02-13 PROCEDURE — 1160F RVW MEDS BY RX/DR IN RCRD: CPT | Performed by: INTERNAL MEDICINE

## 2024-02-13 PROCEDURE — 3077F SYST BP >= 140 MM HG: CPT | Performed by: INTERNAL MEDICINE

## 2024-02-13 PROCEDURE — 1159F MED LIST DOCD IN RCRD: CPT | Performed by: INTERNAL MEDICINE

## 2024-02-13 PROCEDURE — 3078F DIAST BP <80 MM HG: CPT | Performed by: INTERNAL MEDICINE

## 2024-02-13 PROCEDURE — 1126F AMNT PAIN NOTED NONE PRSNT: CPT | Performed by: INTERNAL MEDICINE

## 2024-02-13 ASSESSMENT — PAIN SCALES - GENERAL: PAINLEVEL: 0-NO PAIN

## 2024-02-13 ASSESSMENT — ENCOUNTER SYMPTOMS
CHILLS: 0
DEPRESSION: 0
SHORTNESS OF BREATH: 1
NEUROLOGICAL NEGATIVE: 1
COUGH: 0
FEVER: 0
SLEEPING PROBLEMS DURING THE LAST MONTH: 1
PSYCHIATRIC NEGATIVE: 1
CARDIOVASCULAR NEGATIVE: 1
GASTROINTESTINAL NEGATIVE: 1

## 2024-02-13 ASSESSMENT — PATIENT HEALTH QUESTIONNAIRE - PHQ9
2. FEELING DOWN, DEPRESSED OR HOPELESS: NOT AT ALL
SUM OF ALL RESPONSES TO PHQ9 QUESTIONS 1 AND 2: 0
1. LITTLE INTEREST OR PLEASURE IN DOING THINGS: NOT AT ALL

## 2024-02-13 ASSESSMENT — COLUMBIA-SUICIDE SEVERITY RATING SCALE - C-SSRS
6. HAVE YOU EVER DONE ANYTHING, STARTED TO DO ANYTHING, OR PREPARED TO DO ANYTHING TO END YOUR LIFE?: NO
2. HAVE YOU ACTUALLY HAD ANY THOUGHTS OF KILLING YOURSELF?: NO

## 2024-02-13 NOTE — PROGRESS NOTES
Subjective   Patient ID: Jayy Coles is a 63 y.o. male who presents for Lung Eval.  h/o asthma (+methacholine ), mod restriction 3/2022 previously admitted w/ COVID here for f/u.  3L. Completed NE. CT-chest w/ stable GGOs. Still with dyspnea on exertion.  No fevers.  Some chills.  Occasional cough.  Did not have titration study due to cost but now on Medicare.  LE edema has persists. On Symbicort. Uses rescue 3x/week. ET is about 100 feet but limited by knee pain.     Sleeping Problem  Pertinent negatives include no chills, coughing, fever or rash.       Review of Systems   Constitutional:  Negative for chills and fever.   Respiratory:  Positive for shortness of breath. Negative for cough.    Cardiovascular: Negative.    Gastrointestinal: Negative.    Skin:  Negative for rash.   Neurological: Negative.    Psychiatric/Behavioral: Negative.     All other systems reviewed and are negative.      Objective   Physical Exam  Vitals reviewed.   Constitutional:       Appearance: Normal appearance. He is obese.   HENT:      Head: Normocephalic and atraumatic.   Eyes:      Extraocular Movements: Extraocular movements intact.   Cardiovascular:      Rate and Rhythm: Normal rate and regular rhythm.      Heart sounds: Normal heart sounds.   Pulmonary:      Effort: Pulmonary effort is normal.      Breath sounds: Decreased breath sounds present.   Abdominal:      Palpations: Abdomen is soft.      Tenderness: There is no abdominal tenderness.   Musculoskeletal:      Cervical back: Normal range of motion.      Right lower le+ Pitting Edema present.      Left lower le+ Pitting Edema present.   Skin:     General: Skin is warm.   Neurological:      General: No focal deficit present.      Mental Status: He is alert and oriented to person, place, and time. Mental status is at baseline.   Psychiatric:         Mood and Affect: Mood normal.         Behavior: Behavior normal.         Assessment/Plan   Problem List Items  Addressed This Visit       Asthma - Primary     Mod restriction 3/2022. Cont Symbicort to 160mcg. Repeat PFTs         Relevant Orders    Complete Pulmonary Function Test Pre/Post Bronchodialator (Spirometry Pre/Post/DLCO/Lung Volumes)    On home oxygen therapy     Cont 3L.  Also some cardiac component.  Cont diuretics.         Post covid-19 condition, unspecified     GGOs likely related to post COVID inflammation.  Stable on most recent imaging.         Breathing-related sleep disorder     Severe RICCI - Will get titration study.         Relevant Orders    In-Center Sleep Study     Other Visit Diagnoses       Ground glass opacity present on imaging of lung            RTC in 6 months    Time Spent  Prep time on day of patient encounter: 5 minutes  Time spent directly with patient, family or caregiver: 10 minutes  Additional Time Spent on Patient Care Activities: 0 minutes  Documentation Time: 5 minutes  Other Time Spent: 0 minutes  Total: 20 minutes      Dusty Vásquez MD 02/13/24 12:28 PM

## 2024-02-16 ENCOUNTER — APPOINTMENT (OUTPATIENT)
Dept: PULMONOLOGY | Facility: CLINIC | Age: 64
End: 2024-02-16
Payer: MEDICARE

## 2024-02-19 ENCOUNTER — OFFICE VISIT (OUTPATIENT)
Dept: ENDOCRINOLOGY | Facility: CLINIC | Age: 64
End: 2024-02-19
Payer: MEDICARE

## 2024-02-19 VITALS — WEIGHT: 314 LBS | SYSTOLIC BLOOD PRESSURE: 140 MMHG | DIASTOLIC BLOOD PRESSURE: 80 MMHG | BODY MASS INDEX: 46.37 KG/M2

## 2024-02-19 DIAGNOSIS — R53.83 OTHER FATIGUE: ICD-10-CM

## 2024-02-19 DIAGNOSIS — R79.89 ABNORMAL TSH: ICD-10-CM

## 2024-02-19 DIAGNOSIS — I10 PRIMARY HYPERTENSION: ICD-10-CM

## 2024-02-19 DIAGNOSIS — E53.8 VITAMIN B 12 DEFICIENCY: ICD-10-CM

## 2024-02-19 DIAGNOSIS — Z79.4 TYPE 2 DIABETES MELLITUS WITH DIABETIC POLYNEUROPATHY, WITH LONG-TERM CURRENT USE OF INSULIN (MULTI): ICD-10-CM

## 2024-02-19 DIAGNOSIS — E78.2 MIXED HYPERLIPIDEMIA: Primary | ICD-10-CM

## 2024-02-19 DIAGNOSIS — E11.42 TYPE 2 DIABETES MELLITUS WITH DIABETIC POLYNEUROPATHY, WITH LONG-TERM CURRENT USE OF INSULIN (MULTI): ICD-10-CM

## 2024-02-19 LAB — POC HEMOGLOBIN A1C: 11.5 % (ref 4.2–6.5)

## 2024-02-19 PROCEDURE — 1036F TOBACCO NON-USER: CPT | Performed by: NURSE PRACTITIONER

## 2024-02-19 PROCEDURE — 83036 HEMOGLOBIN GLYCOSYLATED A1C: CPT | Performed by: NURSE PRACTITIONER

## 2024-02-19 PROCEDURE — 3077F SYST BP >= 140 MM HG: CPT | Performed by: NURSE PRACTITIONER

## 2024-02-19 PROCEDURE — 1126F AMNT PAIN NOTED NONE PRSNT: CPT | Performed by: NURSE PRACTITIONER

## 2024-02-19 PROCEDURE — 3079F DIAST BP 80-89 MM HG: CPT | Performed by: NURSE PRACTITIONER

## 2024-02-19 PROCEDURE — 1159F MED LIST DOCD IN RCRD: CPT | Performed by: NURSE PRACTITIONER

## 2024-02-19 PROCEDURE — 99214 OFFICE O/P EST MOD 30 MIN: CPT | Performed by: NURSE PRACTITIONER

## 2024-02-19 PROCEDURE — 95251 CONT GLUC MNTR ANALYSIS I&R: CPT | Performed by: NURSE PRACTITIONER

## 2024-02-19 PROCEDURE — 1160F RVW MEDS BY RX/DR IN RCRD: CPT | Performed by: NURSE PRACTITIONER

## 2024-02-19 RX ORDER — FUROSEMIDE 20 MG/1
80 TABLET ORAL 2 TIMES DAILY
COMMUNITY
Start: 2024-01-25 | End: 2024-03-14 | Stop reason: WASHOUT

## 2024-02-19 ASSESSMENT — PATIENT HEALTH QUESTIONNAIRE - PHQ9
2. FEELING DOWN, DEPRESSED OR HOPELESS: NOT AT ALL
1. LITTLE INTEREST OR PLEASURE IN DOING THINGS: NOT AT ALL
SUM OF ALL RESPONSES TO PHQ9 QUESTIONS 1 AND 2: 0

## 2024-02-19 ASSESSMENT — PAIN SCALES - GENERAL: PAINLEVEL: 0-NO PAIN

## 2024-02-19 ASSESSMENT — ENCOUNTER SYMPTOMS: DEPRESSION: 0

## 2024-02-19 NOTE — PATIENT INSTRUCTIONS
19 clicks x 4 weeks total (0.25 mg) dose  38 clicks x 4 weeks total (0.50 mg) dose  Then you can go to 1 mg weekly   Resume Jardiance   Watch sugar intake

## 2024-02-19 NOTE — PROGRESS NOTES
HPI     Here for follow up/metabolic management 61 yo with DM Type 2 diagnosed age 43. History HTN, HLD, IBS, Afib with Covid 2021, HFpEF (Dr. Alix CHUNG) OA Knees, O2 night time use. Current A1C 11.5%.  Patient testing sugars 4 times day. Following carb controlled diet somewhat and know reasonable carb allowances. Patient able to afford their medications. Patient is active/chronic pain.    Previous patient of Dr. Booker, was not in the office for over 1 year ([id number]) due to several illnesses, post covid and knee surgery complications/infections.  -CGM Casandra 3 with phone kapil. Currently on insulin checking BS at least 4 xs a day adjustments made based on readings/frequent visits to manage.  -INSULIN Toujeo 120 units Humalog ISF 20 >120         -Metformin stopped/GI intolerant, SGLT2 Jardiance now can afford TZD (no due to CHF) GLP1 Ozempic 0.50mg  -HTN Metoprolol, Furosemide 100 mg daily at goal  -STATIN Rosuvastatin 10 mg LDL 92 (has been taking Colestipol for IBS)        90 day Casandra report downloaded and attached 7% time in target, 0% lows, avg bs 295. Has been waking up with BS above 200, then the rest of the day he is above target. Not always dosing before he eats but is taking mealtime insulin.         Current Outpatient Medications:     albuterol 90 mcg/actuation inhaler, 2 puffs every 4 hours if needed for shortness of breath or wheezing (cough)., Disp: , Rfl:     blood-glucose sensor (FreeStyle Casandra 3 Sensor) device, Apply one sensor to the skin every 14 days to test blood sugars, Disp: 2 each, Rfl: 11    cephalexin (Keflex) 500 mg capsule, Take 1 capsule (500 mg) by mouth 2 times a day., Disp: 60 capsule, Rfl: 2    colestipol (Colestid) 1 gram tablet, Take 2 tablets (2 g) by mouth once daily., Disp: , Rfl:     FreeStyle glucose monitoring (FreeStyle Lite Meter) kit, 1 each., Disp: , Rfl:     FreeStyle Lite Strips strip, 4 times a day., Disp: , Rfl:     furosemide (Lasix) 20 mg tablet, Take 4 tablets (80  "mg) by mouth 2 times a day., Disp: , Rfl:     furosemide (Lasix) 80 mg tablet, Take 1 tablet (80 mg) by mouth 2 times a day., Disp: 180 tablet, Rfl: 3    HumaLOG KwikPen Insulin 100 unit/mL injection, Use as directed with meals up to 110 units a day disp 105 ml 90 day supply, Disp: 105 mL, Rfl: 3    insulin glargine (Toujeo Max U-300 SoloStar) 300 unit/mL (3 mL) injection, INJECT UP  UNITS UNDER THE SKIN EVERY DAY, Disp: 36 mL, Rfl: 3    metoprolol tartrate (Lopressor) 25 mg tablet, Take 1 tablet (25 mg) by mouth 2 times a day., Disp: 180 tablet, Rfl: 0    naloxone (Narcan) 4 mg/0.1 mL nasal spray, Administer into affected nostril(s)., Disp: , Rfl:     nystatin (Mycostatin) cream, APPLY AND RUB IN A THIN LAYER TOPICALLY TO THE AFFECTED AREA TWICE DAILY IN THE MORNING AND IN THE EVENING, Disp: , Rfl:     oxyCODONE (Roxicodone) 10 mg immediate release tablet, Take 1 tablet (10 mg) by mouth 4 times a day as needed for moderate pain (4 - 6) for up to 28 days. Do not start before January 26, 2024., Disp: 112 tablet, Rfl: 0    [START ON 2/23/2024] oxyCODONE (Roxicodone) 10 mg immediate release tablet, Take 1 tablet (10 mg) by mouth 4 times a day as needed for moderate pain (4 - 6) for up to 28 days. Do not start before February 23, 2024., Disp: 112 tablet, Rfl: 0    [START ON 3/22/2024] oxyCODONE (Roxicodone) 10 mg immediate release tablet, Take 1 tablet (10 mg) by mouth 4 times a day as needed for moderate pain (4 - 6) for up to 28 days. Do not start before March 22, 2024., Disp: 112 tablet, Rfl: 0    pen needle, diabetic (BD Ultra-Fine Short Pen Needle) 31 gauge x 5/16\" needle, Use as directed, Disp: , Rfl:     pregabalin (Lyrica) 150 mg capsule, Take 1 capsule (150 mg) by mouth 3 times a day. Do not start before February 16, 2024., Disp: 90 capsule, Rfl: 2    rosuvastatin (Crestor) 10 mg tablet, Take 1 tablet (10 mg) by mouth once daily., Disp: 90 tablet, Rfl: 3    Symbicort 160-4.5 mcg/actuation inhaler, Inhale " 2 puffs 2 times a day., Disp: 1 each, Rfl: 11    baclofen (Lioresal) 10 mg tablet, Take 1 tablet (10 mg) by mouth 3 times a day., Disp: 90 tablet, Rfl: 0    diclofenac sodium (Voltaren) 1 % gel gel, Apply 1 Application topically 4 times a day as needed (as needed)., Disp: 100 g, Rfl: 2    empagliflozin (Jardiance) 25 mg, Take 1 tablet (25 mg) by mouth once daily., Disp: 30 tablet, Rfl: 11    semaglutide (Ozempic) 1 mg/dose (4 mg/3 mL) pen injector, Inject 1 mg under the skin 1 (one) time per week., Disp: 3 mL, Rfl: 6    sodium hyaluronate (Durolane) 60 mg/3 mL injection, INJECT RIGHT KNEE 1 TIME; QTY 1 X 3 ML SYRINGE (Patient not taking: Reported on 2/19/2024), Disp: 3 mL, Rfl: 0      Allergies as of 02/19/2024 - Reviewed 02/19/2024   Allergen Reaction Noted    Terbinafine Unknown 03/21/2023    Penicillins Rash 03/21/2023         Review of Systems   Cardiology: Lightheadedness-denies.  Chest pain-denies.  Leg edema-denies.  Palpitations-denies.  Respiratory: Cough-denies. Shortness of breath-denies.  Wheezing-denies.  Gastroenterology: Constipation-denies.  Diarrhea-denies.  Heartburn-denies.  Endocrinology: Cold intolerance-denies.  Heat intolerance-denies.  Sweats-denies.  Neurology: Headache-denies.  Tremor-denies.  Neuropathy in extremities-denies.  Psychology: Low energy-denies.  Irritability-denies.  Sleep disturbances-denies.      /80   Wt 142 kg (314 lb)   BMI 46.37 kg/m²       Labs:  Lab Results   Component Value Date    WBC 11.4 (H) 08/15/2023    NRBC CANCELED 08/15/2023    RBC 5.79 08/15/2023    HGB 17.3 08/15/2023    HCT 53.6 (H) 08/15/2023     08/15/2023     Lab Results   Component Value Date    CALCIUM 9.1 08/15/2023    AST 20 08/15/2023    ALKPHOS 93 08/15/2023    BILITOT 0.5 08/15/2023    PROT 7.7 08/15/2023    ALBUMIN 4.1 08/15/2023     08/15/2023    K 3.9 08/15/2023    CL 99 08/15/2023    CO2 28 08/15/2023    ANIONGAP 14 08/15/2023    BUN 24 (H) 08/15/2023    CREATININE 1.01  08/15/2023    GLUCOSE 194 (H) 08/15/2023    ALT 25 08/15/2023     Lab Results   Component Value Date    CHOL 205 (H) 08/15/2023    TRIG 344 (H) 08/15/2023    HDL 44.2 08/15/2023     Lab Results   Component Value Date    TSH 4.03 (H) 08/15/2023         Physical Exam   General Appearance: pleasant, cooperative, no acute distress  HEENT: no chemosis, no proptosis, no lid lag, no lid retraction  Neck: no lymphadenopathy, no thyromegaly, no dominant thyroid nodules  Heart: no murmurs, regular rate and rhythm, S1 and S2  Lungs: no wheezes, no rhonci, no rales  Extremities: 1-2+ edema BLE L>R      Assessment/Plan   1. Type 2 diabetes mellitus with diabetic polyneuropathy, with long-term current use of insulin (CMS/[facility])  -basal insulin up to 120 units reviewed how to adjust based on waking bs  -not always using meal time dosing, forgets, or takes it late  -will titrate up to 1 mg GLP1  -will resume Jardiance  -reinforced carbs per meal/snacks  -declined seeing diabetic educator  -follow up 2 months  -having cortisone injection soon, asked to call office if BS uncontrolled and to try adding 2-8 units onto the mealtime if ZBS above 180 2 hours after eating    - POCT glycosylated hemoglobin (Hb A1C) manually resulted  - empagliflozin (Jardiance) 25 mg; Take 1 tablet (25 mg) by mouth once daily.  Dispense: 30 tablet; Refill: 11     2. HTN  -stable    3. HLD  -due for labs  -LDL target <  70    Follow Up: 2 months    -labs/tests/notes reviewed  -reviewed and counseled patient on medication monitoring and side effects    Medical Decision Making  Complexity of problem: Chronic illness of diabetes mellitus uncontrolled, progressing  Data analyzed and reviewed: Reviewed prior notes, blood glucose data, labs including HgbA1c, lipids, serum chemistries.  Ordered tests.   Risk of complications and morbidities: Is definite because of use of insulin and risk of hypoglycemia.  Prescription medications reviewed and modifications  made.  Compliance assessed.  Addressed social determinants of health including food insecurity.

## 2024-02-28 ENCOUNTER — OFFICE VISIT (OUTPATIENT)
Dept: ORTHOPEDIC SURGERY | Facility: CLINIC | Age: 64
End: 2024-02-28
Payer: MEDICARE

## 2024-02-28 DIAGNOSIS — M25.561 CHRONIC PAIN OF RIGHT KNEE: ICD-10-CM

## 2024-02-28 DIAGNOSIS — R51.9 ACHING HEADACHE: Primary | ICD-10-CM

## 2024-02-28 DIAGNOSIS — G89.29 CHRONIC PAIN OF RIGHT KNEE: ICD-10-CM

## 2024-02-28 PROCEDURE — 1159F MED LIST DOCD IN RCRD: CPT | Performed by: ORTHOPAEDIC SURGERY

## 2024-02-28 PROCEDURE — 1036F TOBACCO NON-USER: CPT | Performed by: ORTHOPAEDIC SURGERY

## 2024-02-28 PROCEDURE — 1160F RVW MEDS BY RX/DR IN RCRD: CPT | Performed by: ORTHOPAEDIC SURGERY

## 2024-02-28 PROCEDURE — 20610 DRAIN/INJ JOINT/BURSA W/O US: CPT | Performed by: ORTHOPAEDIC SURGERY

## 2024-02-28 PROCEDURE — 1126F AMNT PAIN NOTED NONE PRSNT: CPT | Performed by: ORTHOPAEDIC SURGERY

## 2024-02-28 PROCEDURE — 99214 OFFICE O/P EST MOD 30 MIN: CPT | Performed by: ORTHOPAEDIC SURGERY

## 2024-02-28 RX ORDER — TRIAMCINOLONE ACETONIDE 40 MG/ML
1 INJECTION, SUSPENSION INTRA-ARTICULAR; INTRAMUSCULAR
Status: COMPLETED | OUTPATIENT
Start: 2024-02-28 | End: 2024-02-28

## 2024-02-28 RX ADMIN — TRIAMCINOLONE ACETONIDE 1 ML: 40 INJECTION, SUSPENSION INTRA-ARTICULAR; INTRAMUSCULAR at 09:48

## 2024-02-28 NOTE — PROGRESS NOTES
This is a consultation from GEOVANNI Darnell-CNP for   Chief Complaint   Patient presents with   • Right Knee - Pain       This is a 63 y.o. male who presents for Follow-up for his right knee.  Patient severe right knee arthritis, he had gel injections back in the fall.  They did not really give him much relief.  Since then he had return of his symptoms exacerbation of his pain.  Complains of sharp pain over the medial and anterior knee worse with walking.  Uses a walker.  He is having difficulty with his lungs.  It is causing him significant disability overall.    Physical Exam    There has been no interval change in this patient's past medical, surgical, medications, allergies, family history or social history since the most recent visit to a provider within our department. 14 point review of systems was performed, reviewed, and negative except for pertinent positives documented in the history of present illness.     Constitutional: well developed, well nourished male in no acute distress  Psychiatric: normal mood, appropriate affect  Eyes: sclera anicteric  HENT: normocephalic/atraumatic  CV: regular rate and rhythm   Respiratory: non labored breathing  Integumentary: no rash  Neurological: moves all extremities    Right knee exam: skin intact no lacerations or abrations.  1+ effusion.  Tender medial joint line. negative log roll negative patellar grind. ROM 0-120. stable to varus and valgus stress at 0 and 30 degrees. negative lachman negative posterior drawer negative vianca. 5/5 ehl/fhl/gs/ta. silt s/s/sp/dp/t. 2+ dp/pt        Xrays were ordered by me, they were reviewed and independently interpreted by me today, they show bone-on-bone arthritis right knee    L Inj/Asp: R knee on 2/28/2024 9:48 AM  Indications: pain and joint swelling  Details: 22 G needle, anterolateral approach  Medications: 1 mL triamcinolone acetonide 40 mg/mL    Discussion:  I discussed the conservative treatment options for  knee osteoarthritis including but not limited to physical therapy, oral NSAIDS, activity and lifestyle modification, and corticosteroid injections. Pt has elected to undergo a cortisone injection today. I have explained the risk and benefits of an injection including the possibility of joint infection, bleeding, damage to cartilage, allergic reaction. Patient verbalized understanding and gave verbal consent wishes to proceed with a intra-articular cortisone injection for their knee.    Procedure:  After discussing the risk and benefits of the procedure, we proceeded with an intra-articular right knee injection. We discussed the risks and benefits and potential morbidity related to the treatment, and to the prescription medication administered in the injection    With the patient's informed verbal consent, the right knee was prepped in standard sterile fashion with Chlorhexidine. The skin was then anesthetized with ethyl chloride spray and cleaned again with Chlorhexidine. The knee was then apirated/injected with a prefilled 20-gauge syringe of 40 mg Kenalog + 4 ml Lidocaine using the lateral approach without complications.  The patient tolerated this well and felt immediate initial relief of symptoms. A bandaid was applied and the patient ambulated out of the clinic on ther own accord without difficulty. Patient was instructed to avoid physical activity for 24-48 hours to prevent the knees from swelling and may ice the knees as tolerated. Patient should contact the office if any signs of of infection appear: redness, fever, chills, drainage, swelling or warmth to the knees.  Pt understands that the injections can be repeated no sooner than 3 months.    Procedure, treatment alternatives, risks and benefits explained, specific risks discussed. Consent was given by the patient. Immediately prior to procedure a time out was called to verify the correct patient, procedure, equipment, support staff and site/side marked as  "required. Patient was prepped and draped in the usual sterile fashion.         Impression/Plan: This is a 63 y.o. male with severe right knee arthritis.  I had an in depth discussion with the patient regarding treatment options for arthritis and their relative risks and benefits. We reviewed surgical and nonsurgical option for treatment. Treatments include anti inflammatory medications, physical therapy, weight loss, activity modification, use of assistive devices, injection therapies. We discussed current prescriptions and risks and benefits of continuation of prescription medication as apporpriate. We discussed that arthritis is often progressive over time, an in end stage arthritis surgical interventions can be considered, including arthroplasty. All questions were answered and the patient voiced their understanding.  Not a candidate for arthroplasty given his BMI and medical comorbidities as well as complications he had on the left side.  Recommend continued nonsurgical management.  Second opinion offered.  Pain management offered.    BMI Readings from Last 1 Encounters:   02/19/24 46.37 kg/m²      Lab Results   Component Value Date    CREATININE 1.01 08/15/2023     Tobacco Use: Low Risk  (2/28/2024)    Patient History    • Smoking Tobacco Use: Never    • Smokeless Tobacco Use: Never    • Passive Exposure: Never      Computed MELD 3.0 unavailable. Necessary lab results were not found in the last year.  Computed MELD-Na unavailable. Necessary lab results were not found in the last year.       Lab Results   Component Value Date    HGBA1C 11.5 (A) 02/19/2024     No results found for: \"STAPHMRSASCR\"  "

## 2024-03-11 DIAGNOSIS — I50.30 HEART FAILURE WITH PRESERVED LEFT VENTRICULAR FUNCTION (HFPEF) (MULTI): ICD-10-CM

## 2024-03-11 RX ORDER — FUROSEMIDE 20 MG/1
80 TABLET ORAL 2 TIMES DAILY
Qty: 720 TABLET | Refills: 3 | OUTPATIENT
Start: 2024-03-11 | End: 2025-03-11

## 2024-03-14 DIAGNOSIS — I50.30 HEART FAILURE WITH PRESERVED LEFT VENTRICULAR FUNCTION (HFPEF) (MULTI): ICD-10-CM

## 2024-03-14 RX ORDER — FUROSEMIDE 80 MG/1
80 TABLET ORAL 2 TIMES DAILY
Qty: 180 TABLET | Refills: 3 | Status: SHIPPED | OUTPATIENT
Start: 2024-03-14 | End: 2025-03-14

## 2024-03-20 NOTE — PROGRESS NOTES
"Subjective   Reason for Visit: Jayy Coles is an 63 y.o. male here for a Medicare Wellness visit.     Past Medical, Surgical, and Family History reviewed and updated in chart.    Reviewed all medications by prescribing practitioner or clinical pharmacist (such as prescriptions, OTCs, herbal therapies and supplements) and documented in the medical record.    KASHIF Hope is a 62 yo est male presenting today for WELCOME to Medicare     Dx: Hx Afib, ASTHMA, EDEMA, HFpEF, T2DM, ASTHMA, GERD, HLD, OBESITY, RICCI, HTN, IBS, OA (knees), HOME O2 USE   PMH: DVT (RLE), COLON Ca, Covid (2021),   PSH: TKR (left) w/mult revisions    Pt is accompanied by his wife Arianna today    Pt is followed by Endocrinology, SIN Joe, used to see Dr. Booker   No concerns per pt  Last A1C= 11.5 Feb 2024--> will consult [facility] for assistance     Pt is followed by Cardiology, Dr. Thomson  Denies CP/Palpitations     Pt is followed by Pulmonology, Dr. Vásquez  Has PERSAUD, feels miserable since Covid     Pt is followed by Ortho, Dr. Mosquera   Had RIGHT knee injection 2 weeks ago   Felt \"great\" for 10 days, starting to have some more discomfort since yesterday     Pt is asking me for a motorized scooter to get around better outside the home  He is currently using a walker, but due to his PERSAUD and RIGHT knee instability, he can not walk distances greater than 200 feet without having to sit down and rest for long periods of time.     Pt states his grandson plays travel soccer and he loves going to the games, but the distance from the car to the stands is very far and he can not walk it with his walker, he gets too winded and has to sit down.  He does not want a manual w/c because his RIGHT knee does not bend well and he does not want to be dependant on anyone to push him around. He would like to be able to be more independent, which could be with a motorized scooter.         Allergies   Allergen Reactions    Terbinafine Unknown    Penicillins Rash     \"red " "and hot\"       Patient Care Team:  LINSEY Campoverde as PCP - General  LINSEY Campoverde as PCP - [facility] Attributed Provider  LINSEY Campoverde       Review of Systems  ROS was completed and all systems are negative with the exception of what was noted in the the HPI.       Objective   Vitals:  /72   Pulse 78   Ht 1.727 m (5' 8\")   Wt 147 kg (324 lb)   SpO2 92%   BMI 49.26 kg/m²       Current Outpatient Medications   Medication Instructions    albuterol 90 mcg/actuation inhaler 2 puffs, Every 4 hours PRN    baclofen (LIORESAL) 10 mg, oral, 3 times daily    blood-glucose sensor (FreeStyle Casandra 3 Sensor) device Apply one sensor to the skin every 14 days to test blood sugars    cephalexin (KEFLEX) 500 mg, oral, 2 times daily    colestipol (COLESTID) 2 g, oral, Daily    diclofenac sodium (Voltaren) 1 % gel gel 1 Application, Topical, 4 times daily PRN    empagliflozin (JARDIANCE) 25 mg, oral, Daily    FreeStyle glucose monitoring (FreeStyle Lite Meter) kit 1 each    FreeStyle Lite Strips strip 4 times daily    furosemide (LASIX) 80 mg, oral, 2 times daily    HumaLOG KwikPen Insulin 100 unit/mL injection Use as directed with meals up to 110 units a day disp 105 ml 90 day supply    insulin glargine (Toujeo Max U-300 SoloStar) 300 unit/mL (3 mL) injection INJECT UP  UNITS UNDER THE SKIN EVERY DAY    metoprolol tartrate (LOPRESSOR) 25 mg, oral, 2 times daily    naloxone (Narcan) 4 mg/0.1 mL nasal spray nasal    nystatin (Mycostatin) cream APPLY AND RUB IN A THIN LAYER TOPICALLY TO THE AFFECTED AREA TWICE DAILY IN THE MORNING AND IN THE EVENING    oxyCODONE (ROXICODONE) 10 mg, oral, 4 times daily PRN    oxyCODONE (ROXICODONE) 10 mg, oral, 4 times daily PRN    [START ON 3/22/2024] oxyCODONE (ROXICODONE) 10 mg, oral, 4 times daily PRN    Ozempic 1 mg, subcutaneous, Weekly    pen needle, diabetic (BD Ultra-Fine Short Pen Needle) 31 gauge x 5/16\" needle Use as directed    pregabalin " (LYRICA) 150 mg, oral, 3 times daily    rosuvastatin (CRESTOR) 10 mg, oral, Daily    sodium hyaluronate (Durolane) 60 mg/3 mL injection INJECT RIGHT KNEE 1 TIME; QTY 1 X 3 ML SYRINGE    Symbicort 160-4.5 mcg/actuation inhaler 2 puffs, inhalation, 2 times daily         Physical Exam  Vitals reviewed.   Cardiovascular:      Pulses: Normal pulses.      Heart sounds: Normal heart sounds.   Pulmonary:      Effort: Pulmonary effort is normal.      Breath sounds: No wheezing.   Abdominal:      General: Bowel sounds are normal.   Musculoskeletal:      Right lower leg: Tenderness present.        Legs:    Skin:     General: Skin is warm and dry.   Neurological:      Mental Status: He is alert.         Assessment/Plan     Problem List Items Addressed This Visit             ICD-10-CM    Type 2 diabetes mellitus with hyperglycemia, with long-term current use of insulin (CMS/HCC) E11.65, Z79.4     A1C= 11.3 Feb 2024  Pt followed by SIN Duggan  Referral to [facility] for assistance w/A1C          Relevant Orders    Referral to Clinical Pharmacy    Atherosclerosis of aorta (CMS/[facility]) I70.0     Followed by cardiology, SATINDER Palacios Dec 2023  Condition stable based on symptoms and exam.    Continue current medications and follow-up at least yearly.           Heart failure with preserved left ventricular function (HFpEF) (CMS/HCC) I50.30     Followed by cardiology, Dr. Alix RAJAN Dec 2023  Condition stable based on symptoms and exam.    Continue current medications and follow-up at least yearly.           Impaired mobility Z74.09    Body mass index (BMI) 45.0-49.9, adult (CMS/[facility]) Z68.42     In a face to face session, I informed patient of his BMI > 30.  We discussed appropriate nutrition choices and exercise plan to help achieve weight reduction.   Aim for 60 gm of Protein daily  Drink 60 oz of Water daily  Exercise 60 min EVERYDAY   Difficult to manage with mobility restrictions 2/2 knee and PERSAUD and steroid injections for  RIGHT knee            Welcome to Medicare preventive visit - Primary Z00.00     - Counseled on healthy diet and regular exercise  - Fall avoidance information provided  - Personalized prevention plan provided   - Mammogram ordered  - Colon UTD  - Vaccines UTD                   Other Visit Diagnoses         Codes    Routine general medical examination at health care facility     Z00.00             Patient was identified as a fall risk. Risk prevention instructions provided.

## 2024-03-21 ENCOUNTER — OFFICE VISIT (OUTPATIENT)
Dept: PRIMARY CARE | Facility: CLINIC | Age: 64
End: 2024-03-21
Payer: MEDICARE

## 2024-03-21 VITALS
OXYGEN SATURATION: 92 % | BODY MASS INDEX: 47.74 KG/M2 | HEIGHT: 68 IN | HEART RATE: 78 BPM | WEIGHT: 315 LBS | SYSTOLIC BLOOD PRESSURE: 118 MMHG | DIASTOLIC BLOOD PRESSURE: 72 MMHG

## 2024-03-21 DIAGNOSIS — I50.30 HEART FAILURE WITH PRESERVED LEFT VENTRICULAR FUNCTION (HFPEF) (MULTI): ICD-10-CM

## 2024-03-21 DIAGNOSIS — Z79.4 TYPE 2 DIABETES MELLITUS WITH HYPERGLYCEMIA, WITH LONG-TERM CURRENT USE OF INSULIN (MULTI): ICD-10-CM

## 2024-03-21 DIAGNOSIS — Z74.09 IMPAIRED MOBILITY: ICD-10-CM

## 2024-03-21 DIAGNOSIS — Z00.00 WELCOME TO MEDICARE PREVENTIVE VISIT: Primary | ICD-10-CM

## 2024-03-21 DIAGNOSIS — E11.65 TYPE 2 DIABETES MELLITUS WITH HYPERGLYCEMIA, WITH LONG-TERM CURRENT USE OF INSULIN (MULTI): ICD-10-CM

## 2024-03-21 DIAGNOSIS — I70.0 ATHEROSCLEROSIS OF AORTA (CMS-HCC): ICD-10-CM

## 2024-03-21 DIAGNOSIS — Z00.00 ROUTINE GENERAL MEDICAL EXAMINATION AT HEALTH CARE FACILITY: ICD-10-CM

## 2024-03-21 PROBLEM — E66.01 MORBID OBESITY WITH BMI OF 50.0-59.9, ADULT (MULTI): Status: RESOLVED | Noted: 2023-09-24 | Resolved: 2024-03-21

## 2024-03-21 PROCEDURE — 99214 OFFICE O/P EST MOD 30 MIN: CPT | Performed by: NURSE PRACTITIONER

## 2024-03-21 PROCEDURE — 3074F SYST BP LT 130 MM HG: CPT | Performed by: NURSE PRACTITIONER

## 2024-03-21 PROCEDURE — 1170F FXNL STATUS ASSESSED: CPT | Performed by: NURSE PRACTITIONER

## 2024-03-21 PROCEDURE — 1158F ADVNC CARE PLAN TLK DOCD: CPT | Performed by: NURSE PRACTITIONER

## 2024-03-21 PROCEDURE — 1159F MED LIST DOCD IN RCRD: CPT | Performed by: NURSE PRACTITIONER

## 2024-03-21 PROCEDURE — 1123F ACP DISCUSS/DSCN MKR DOCD: CPT | Performed by: NURSE PRACTITIONER

## 2024-03-21 PROCEDURE — 1160F RVW MEDS BY RX/DR IN RCRD: CPT | Performed by: NURSE PRACTITIONER

## 2024-03-21 PROCEDURE — 3008F BODY MASS INDEX DOCD: CPT | Performed by: NURSE PRACTITIONER

## 2024-03-21 PROCEDURE — G0402 INITIAL PREVENTIVE EXAM: HCPCS | Performed by: NURSE PRACTITIONER

## 2024-03-21 PROCEDURE — 3078F DIAST BP <80 MM HG: CPT | Performed by: NURSE PRACTITIONER

## 2024-03-21 PROCEDURE — 1036F TOBACCO NON-USER: CPT | Performed by: NURSE PRACTITIONER

## 2024-03-21 ASSESSMENT — ACTIVITIES OF DAILY LIVING (ADL)
BATHING: INDEPENDENT
DRESSING: INDEPENDENT
GROCERY_SHOPPING: NEEDS ASSISTANCE
TAKING_MEDICATION: INDEPENDENT
MANAGING_FINANCES: INDEPENDENT
DOING_HOUSEWORK: INDEPENDENT

## 2024-03-21 ASSESSMENT — ENCOUNTER SYMPTOMS
DEPRESSION: 0
LOSS OF SENSATION IN FEET: 1
OCCASIONAL FEELINGS OF UNSTEADINESS: 1

## 2024-03-21 ASSESSMENT — PATIENT HEALTH QUESTIONNAIRE - PHQ9
1. LITTLE INTEREST OR PLEASURE IN DOING THINGS: NOT AT ALL
SUM OF ALL RESPONSES TO PHQ9 QUESTIONS 1 AND 2: 0
2. FEELING DOWN, DEPRESSED OR HOPELESS: NOT AT ALL

## 2024-03-21 NOTE — PATIENT INSTRUCTIONS
Thank you for seeing me today.  It was a pleasure to see you again!    Today we did your Annual Medicare Wellness Exam and discussed the following:     Continue medications as prescribed    I have given you the # for [employer], please call them for the wheelchair and let me know if you need me to do anything    Discussed with patient benefits of a low carbohydrate, low sodium, 1317-0971 calorie diet.    Most healthy diets include plenty of fruits, vegetables, fish and whole grains.  Avoid foods high in unhealthy fats.   Meal plan and avoid skipping meals    1.  Eat 3 meals per day on a regular schedule plus 2 to 3 small snacks     -->  Use My Plate     2.  Measure portions of foods with measuring cups and/or food scale         3.  Eat a lean protein food at each meal- ideas discussed    -->  recommended protein portion for main meal = 6 ounces cooked    -->  avoid processed meats and fast food         4.   Eat 2 to 3 servings of fruit and 5 servings of non-starchy vegetables each day    -->  Fruit:  1 serving equals 1 small fruit, 3/4 cup berries, 1/2 banana    -->  Vegetables:  1 serving equals 1/2 cooked or 1 cup raw         5.  Refer to Mediterranean Diet Nutrition Therapy for foods to choose more often and foods to limit;      6.  Avoid sugar sweetened beverages         7.  Gender / age appropriate Multi Vitamin with minerals         8.  Use online kapil or food journal keep monitor calorie and macronutrient intake;       9.  Physical Activity Guidelines:  150 minutes moderate intensity physical activity per week  -->Healthy exercises include brisk walking, bide riding and swimming, at least 30 min everyday.    Drink at least 64 oz of water per day, avoid beverages high in sugar, like sodas.  Beverages like Propel, Vitamin Water Zero, Sobe Lifewater, Crystal Light are good choices too.  Do not consume more than 2 alcoholic beverages per day.     Apps on your phone/tablet can be helpful:  Weight  Watchers  Calorie Daniel   My fitness Pal  Lose it       Ways to Help Prevent Falls at Home    Quick Tips   ? Ask for help if you need it. Most people want to help!   ? Get up slowly after sitting or laying down   ? Wear a medical alert device or keep cell phone in your pocket   ? Use night lights, especially areas near a bathroom   ? Keep the items you use often within reach on a small stool or end table   ? Use an assistive device such as walker or cane, as directed by provider/physical therapy   ? Use a non-slip mat and grab bars in your bathroom. Look for home health sections for best options     Other Areas to Focus On   ? Exercise and nutrition: Regular exercise or taking a falls prevention class are great ways improve strength and balance. Don’t forget to stay hydrated and bring a snack!   ? Medicine side effects: Some medicines can make you sleepy or dizzy, which could cause a fall. Ask your healthcare provider about the side effects your medicines could cause. Be sure to let them know if you take any vitamins or supplements as well.   ? Tripping hazards: Remove items you could trip on, such as loose mats, rugs, cords, and clutter. Wear closed toe shoes with rubber soles.   ? Health and wellness: Get regular checkups with your healthcare provider, plus routine vision and hearing screenings. Talk with your healthcare provider about:   o Your medicines and the possible side effects - bring them in a bag if that is easier!   o Problems with balance or feeling dizzy   o Ways to promote bone health, such as Vitamin D and calcium supplements   o Questions or concerns about falling     *Ask your healthcare team if you have questions     [facility], 2022     RTC 6 MONTHS AND AS NEEDED

## 2024-03-21 NOTE — ASSESSMENT & PLAN NOTE
Followed by cardiology, Dr. Thomson LOV Dec 2023  Condition stable based on symptoms and exam.    Continue current medications and follow-up at least yearly.

## 2024-03-21 NOTE — ASSESSMENT & PLAN NOTE
- Counseled on healthy diet and regular exercise  - Fall avoidance information provided  - Personalized prevention plan provided   - Mammogram ordered  - Colon UTD  - Vaccines UTD

## 2024-03-21 NOTE — ASSESSMENT & PLAN NOTE
In a face to face session, I informed patient of his BMI > 30.  We discussed appropriate nutrition choices and exercise plan to help achieve weight reduction.   Aim for 60 gm of Protein daily  Drink 60 oz of Water daily  Exercise 60 min EVERYDAY   Difficult to manage with mobility restrictions 2/2 knee and PERSAUD and steroid injections for RIGHT knee

## 2024-03-21 NOTE — ASSESSMENT & PLAN NOTE
Followed by cardiology, Dr. Thomson, LOV Dec 2023  Condition stable based on symptoms and exam.    Continue current medications and follow-up at least yearly.

## 2024-03-24 ENCOUNTER — CLINICAL SUPPORT (OUTPATIENT)
Dept: SLEEP MEDICINE | Facility: CLINIC | Age: 64
End: 2024-03-24
Payer: MEDICARE

## 2024-03-24 DIAGNOSIS — G47.33 OBSTRUCTIVE SLEEP APNEA (ADULT) (PEDIATRIC): ICD-10-CM

## 2024-03-24 DIAGNOSIS — G47.30 BREATHING-RELATED SLEEP DISORDER: ICD-10-CM

## 2024-03-24 PROCEDURE — 95811 POLYSOM 6/>YRS CPAP 4/> PARM: CPT | Performed by: PSYCHIATRY & NEUROLOGY

## 2024-03-25 VITALS
BODY MASS INDEX: 47.74 KG/M2 | WEIGHT: 315 LBS | HEART RATE: 68 BPM | DIASTOLIC BLOOD PRESSURE: 62 MMHG | OXYGEN SATURATION: 98 % | SYSTOLIC BLOOD PRESSURE: 101 MMHG | HEIGHT: 68 IN | RESPIRATION RATE: 14 BRPM

## 2024-03-25 DIAGNOSIS — M17.0 PRIMARY OSTEOARTHRITIS OF BOTH KNEES: ICD-10-CM

## 2024-03-25 RX ORDER — BACLOFEN 10 MG/1
10 TABLET ORAL 3 TIMES DAILY
Qty: 90 TABLET | Refills: 2 | Status: SHIPPED | OUTPATIENT
Start: 2024-03-25 | End: 2024-04-17 | Stop reason: SDUPTHER

## 2024-03-25 ASSESSMENT — SLEEP AND FATIGUE QUESTIONNAIRES
SITTING AND WATCHING TV OR A VIDEO: 3
SITTING AND READING: 3
SITTING AND EATING A MEAL: 0
SITTING AND RIDING IN A CAR OR BUS FOR ABOUT HALF AN HOUR: 0
SITTING IN A CLASSROOM AT SCHOOL DURING THE MORNING: 1
ESS-CHAD TOTAL SCORE: 10
SITTING AND TALKING TO SOMEONE: 1
SITTING QUITELY BY YOURSELF AFTER LUNCH: 0
ESS TOTAL SCORE: 10
LYING DOWN TO REST OR NAP IN THE AFTERNOON: 2

## 2024-03-25 ASSESSMENT — PAIN SCALES - GENERAL: PAINLEVEL: 0-NO PAIN

## 2024-03-25 NOTE — PROGRESS NOTES
[facility] TECH NOTE:     Patient: Jayy Coles   MRN//AGE: [id number]  1960  63 y.o.   Technologist: Korin Gutierrez   Room: 105   Service Date: 3/24/2024        Sleep Testing Location: [facility]:     TECHNOLOGIST SLEEP STUDY PROCEDURE NOTE:   This sleep study is being conducted according to the policies and procedures outlined by the AAS accreditation standards.  The sleep study procedure and processes involved during this appointment was explained to the patient/patient’s family, questions were answered. The patient/family verbalized understanding.      The patient is a 63 y.o. year old male scheduled for a CPAP titration. He arrived for his appointment.      The study that was ultimately completed was a CPAP titration.     The full study Was completed.  Patient questionnaires completed?: yes     Consents signed? yes    Initial Fall Risk Screening:     Jayy has not fallen in the last 6 months.  Jayy does not have a fear of falling. He does not need assistance with sitting, standing, or walking. He does not need assistance walking in his home. He does not need assistance in an unfamiliar setting. The patient is using an assistive device.     Brief Study observations: 63 year old male presents for a CPAP titration. Patient arrived to the sleep lab this evening without his O2 and without his walker. Patient was started on room air. Patient was started on CPAP titration at a pressure of 4 cmH2O, tech increased to 6 cmH20 due to patient needing more pressure to start. CPAP was started using a ResMed AirFit N20 medium. Patient did not like the AirFit N20. Patient stated that is was hurting his nose, and he felt like he could not breath. Tech changed the mask to a ResMed F20 large, patient seemed to have tolerated this mask and fit a lot better. Patient stated that he felt like he could not breath without his O2, he was not comfortable being started on room air. Patient ended on a pressure of 10 cmH2O.  Patient seemed to have tolerated the pressure and mask fit well. Patient woke twice during the night to use the restroom. NSR observed. REM observed.     Deviation to order/protocol and reason: none     If PAP, which was preferred mask/pressure/mode: ResMed AirFit F20 large. CPAP starting pressure 4 cmH2O with an ending pressure of 10 cmH2O.     Other:None    After the procedure, the patient/family was informed to ensure followup with ordering clinician for testing results.      Technologist: Korin Gutierrez

## 2024-04-08 ENCOUNTER — TELEPHONE (OUTPATIENT)
Dept: ORTHOPEDIC SURGERY | Facility: CLINIC | Age: 64
End: 2024-04-08
Payer: MEDICARE

## 2024-04-08 DIAGNOSIS — Z96.652 STATUS POST REVISION OF TOTAL REPLACEMENT OF LEFT KNEE: ICD-10-CM

## 2024-04-08 NOTE — TELEPHONE ENCOUNTER
Patient called stating that his pharmacy  is denying him from getting keflex 500 mg take one tablet BID [place] [phone]

## 2024-04-09 RX ORDER — CEPHALEXIN 500 MG/1
500 CAPSULE ORAL 2 TIMES DAILY
Qty: 60 CAPSULE | Refills: 2 | Status: SHIPPED | OUTPATIENT
Start: 2024-04-09

## 2024-04-11 DIAGNOSIS — G47.33 OSA (OBSTRUCTIVE SLEEP APNEA): ICD-10-CM

## 2024-04-15 ENCOUNTER — TELEPHONE (OUTPATIENT)
Dept: ENDOCRINOLOGY | Facility: CLINIC | Age: 64
End: 2024-04-15

## 2024-04-15 NOTE — TELEPHONE ENCOUNTER
HIS WIFE CALLED TO APOLOGIZE SINCERELY FOR MISSING THIS APPT TODAY. HE WAS RESCHEDULED AND SHE ASSURED MY THAT HE WILL BE THERE.

## 2024-04-16 ENCOUNTER — OFFICE VISIT (OUTPATIENT)
Dept: ENDOCRINOLOGY | Facility: CLINIC | Age: 64
End: 2024-04-16
Payer: MEDICARE

## 2024-04-16 VITALS
BODY MASS INDEX: 49.57 KG/M2 | HEART RATE: 76 BPM | SYSTOLIC BLOOD PRESSURE: 132 MMHG | WEIGHT: 315 LBS | DIASTOLIC BLOOD PRESSURE: 68 MMHG

## 2024-04-16 DIAGNOSIS — Z79.4 TYPE 2 DIABETES MELLITUS WITH HYPERGLYCEMIA, WITH LONG-TERM CURRENT USE OF INSULIN (MULTI): ICD-10-CM

## 2024-04-16 DIAGNOSIS — E53.8 VITAMIN B 12 DEFICIENCY: ICD-10-CM

## 2024-04-16 DIAGNOSIS — E11.42 TYPE 2 DIABETES MELLITUS WITH DIABETIC POLYNEUROPATHY, WITH LONG-TERM CURRENT USE OF INSULIN (MULTI): Primary | ICD-10-CM

## 2024-04-16 DIAGNOSIS — E11.65 TYPE 2 DIABETES MELLITUS WITH HYPERGLYCEMIA, WITH LONG-TERM CURRENT USE OF INSULIN (MULTI): ICD-10-CM

## 2024-04-16 DIAGNOSIS — Z79.4 TYPE 2 DIABETES MELLITUS WITH DIABETIC POLYNEUROPATHY, WITH LONG-TERM CURRENT USE OF INSULIN (MULTI): Primary | ICD-10-CM

## 2024-04-16 DIAGNOSIS — E78.2 MIXED HYPERLIPIDEMIA: ICD-10-CM

## 2024-04-16 DIAGNOSIS — I10 PRIMARY HYPERTENSION: ICD-10-CM

## 2024-04-16 DIAGNOSIS — R79.89 ABNORMAL TSH: ICD-10-CM

## 2024-04-16 LAB — POC HEMOGLOBIN A1C: 10 % (ref 4.2–6.5)

## 2024-04-16 PROCEDURE — 1159F MED LIST DOCD IN RCRD: CPT | Performed by: NURSE PRACTITIONER

## 2024-04-16 PROCEDURE — 1123F ACP DISCUSS/DSCN MKR DOCD: CPT | Performed by: NURSE PRACTITIONER

## 2024-04-16 PROCEDURE — 3008F BODY MASS INDEX DOCD: CPT | Performed by: NURSE PRACTITIONER

## 2024-04-16 PROCEDURE — 83036 HEMOGLOBIN GLYCOSYLATED A1C: CPT | Performed by: NURSE PRACTITIONER

## 2024-04-16 PROCEDURE — 1160F RVW MEDS BY RX/DR IN RCRD: CPT | Performed by: NURSE PRACTITIONER

## 2024-04-16 PROCEDURE — 99214 OFFICE O/P EST MOD 30 MIN: CPT | Performed by: NURSE PRACTITIONER

## 2024-04-16 PROCEDURE — 1125F AMNT PAIN NOTED PAIN PRSNT: CPT | Performed by: NURSE PRACTITIONER

## 2024-04-16 PROCEDURE — 3078F DIAST BP <80 MM HG: CPT | Performed by: NURSE PRACTITIONER

## 2024-04-16 PROCEDURE — 95251 CONT GLUC MNTR ANALYSIS I&R: CPT | Performed by: NURSE PRACTITIONER

## 2024-04-16 PROCEDURE — 3075F SYST BP GE 130 - 139MM HG: CPT | Performed by: NURSE PRACTITIONER

## 2024-04-16 ASSESSMENT — PATIENT HEALTH QUESTIONNAIRE - PHQ9
SUM OF ALL RESPONSES TO PHQ9 QUESTIONS 1 & 2: 0
1. LITTLE INTEREST OR PLEASURE IN DOING THINGS: NOT AT ALL
2. FEELING DOWN, DEPRESSED OR HOPELESS: NOT AT ALL

## 2024-04-16 ASSESSMENT — PAIN SCALES - GENERAL: PAINLEVEL: 6

## 2024-04-16 NOTE — PROGRESS NOTES
HPI   Presents for follow up/metabolic management 62 yo with DM Type 2 diagnosed age 43. History HTN, HLD, IBS, Afib with Covid 2021, HFpEF (Dr. Alix CHUNG) OA Knees, O2 night time use. Current A1C 10.0% was 11.5%.  Patient testing sugars > 4 times day. Following carb controlled diet somewhat and know reasonable carb allowances. Patient able to afford their medications. Patient is active/chronic pain, not exercising.  Previous patient of Dr. Booker, was not in the office for over 1 year ([id number]) due to several illnesses, post covid and knee surgery complications/infections.    -CGM Casandra 3 with phone kapil. Currently on insulin checking BS at least 4 xs a day adjustments made based on readings/frequent visits to manage.  -INSULIN Toujeo 120 units increased to 124 this visit Humalog ISF 20 >120         -Metformin stopped/GI intolerant, SGLT2 Jardiance 25 mg TZD (no due to CHF) GLP1 Ozempic 1 mg   -HTN Metoprolol, Furosemide 100 mg daily at goal  -STATIN Rosuvastatin 10 mg LDL 92 (has been taking Colestipol for IBS)        30 day Casandra report downloaded and attached 15% was 7% time in target, 0% lows, avg bs 244. Has been waking up with BS above 200, then the rest of the day he is above target but has improved.         Current Outpatient Medications:     albuterol 90 mcg/actuation inhaler, 2 puffs every 4 hours if needed for shortness of breath or wheezing (cough)., Disp: , Rfl:     baclofen (Lioresal) 10 mg tablet, Take 1 tablet (10 mg) by mouth 3 times a day., Disp: 90 tablet, Rfl: 2    blood-glucose sensor (FreeStyle Casandra 3 Sensor) device, Apply one sensor to the skin every 14 days to test blood sugars, Disp: 2 each, Rfl: 11    cephalexin (Keflex) 500 mg capsule, Take 1 capsule (500 mg) by mouth 2 times a day., Disp: 60 capsule, Rfl: 2    empagliflozin (Jardiance) 25 mg, Take 1 tablet (25 mg) by mouth once daily., Disp: 30 tablet, Rfl: 11    FreeStyle glucose monitoring (FreeStyle Lite Meter) kit, 1 each., Disp: ,  "Rfl:     FreeStyle Lite Strips strip, 4 times a day., Disp: , Rfl:     furosemide (Lasix) 80 mg tablet, Take 1 tablet (80 mg) by mouth 2 times a day. (Patient taking differently: Take 100 mg by mouth 2 times a day.), Disp: 180 tablet, Rfl: 3    HumaLOG KwikPen Insulin 100 unit/mL injection, Use as directed with meals up to 110 units a day disp 105 ml 90 day supply, Disp: 105 mL, Rfl: 3    insulin glargine (Toujeo Max U-300 SoloStar) 300 unit/mL (3 mL) injection, INJECT UP  UNITS UNDER THE SKIN EVERY DAY, Disp: 36 mL, Rfl: 3    metoprolol tartrate (Lopressor) 25 mg tablet, Take 1 tablet (25 mg) by mouth 2 times a day., Disp: 180 tablet, Rfl: 0    naloxone (Narcan) 4 mg/0.1 mL nasal spray, Administer into affected nostril(s)., Disp: , Rfl:     nystatin (Mycostatin) cream, APPLY AND RUB IN A THIN LAYER TOPICALLY TO THE AFFECTED AREA TWICE DAILY IN THE MORNING AND IN THE EVENING, Disp: , Rfl:     oxyCODONE (Roxicodone) 10 mg immediate release tablet, Take 1 tablet (10 mg) by mouth 4 times a day as needed for moderate pain (4 - 6) for up to 28 days. Do not start before March 22, 2024., Disp: 112 tablet, Rfl: 0    pen needle, diabetic (BD Ultra-Fine Short Pen Needle) 31 gauge x 5/16\" needle, Use as directed, Disp: , Rfl:     pregabalin (Lyrica) 150 mg capsule, Take 1 capsule (150 mg) by mouth 3 times a day. Do not start before February 16, 2024., Disp: 90 capsule, Rfl: 2    rosuvastatin (Crestor) 10 mg tablet, Take 1 tablet (10 mg) by mouth once daily., Disp: 90 tablet, Rfl: 3    sodium hyaluronate (Durolane) 60 mg/3 mL injection, INJECT RIGHT KNEE 1 TIME; QTY 1 X 3 ML SYRINGE, Disp: 3 mL, Rfl: 0    Symbicort 160-4.5 mcg/actuation inhaler, Inhale 2 puffs 2 times a day., Disp: 1 each, Rfl: 11    colestipol (Colestid) 1 gram tablet, Take 2 tablets (2 g) by mouth once daily., Disp: , Rfl:     diclofenac sodium (Voltaren) 1 % gel gel, Apply 1 Application topically 4 times a day as needed (as needed)., Disp: 100 g, Rfl: " 2    oxyCODONE (Roxicodone) 10 mg immediate release tablet, Take 1 tablet (10 mg) by mouth 4 times a day as needed for moderate pain (4 - 6) for up to 28 days. Do not start before January 26, 2024., Disp: 112 tablet, Rfl: 0    oxyCODONE (Roxicodone) 10 mg immediate release tablet, Take 1 tablet (10 mg) by mouth 4 times a day as needed for moderate pain (4 - 6) for up to 28 days. Do not start before February 23, 2024., Disp: 112 tablet, Rfl: 0    semaglutide (Ozempic) 1 mg/dose (4 mg/3 mL) pen injector, Inject 1 mg under the skin 1 (one) time per week., Disp: 3 mL, Rfl: 6      Allergies as of 04/16/2024 - Reviewed 04/16/2024   Allergen Reaction Noted    Terbinafine Unknown 03/21/2023    Penicillins Rash 03/21/2023         Review of Systems   Cardiology: Lightheadedness-denies.  Chest pain-denies.  Leg edema-denies.  Palpitations-denies.  Respiratory: Cough-denies. Shortness of breath-denies.  Wheezing-denies.  Gastroenterology: Constipation-denies.  Diarrhea-denies.  Heartburn-denies.  Endocrinology: Cold intolerance-denies.  Heat intolerance-denies.  Sweats-denies.  Neurology: Headache-denies.  Tremor-denies.  Neuropathy in extremities-denies.  Psychology: Low energy-denies.  Irritability-denies.  Sleep disturbances-denies.      /68 (BP Location: Left arm, Patient Position: Sitting)   Pulse 76   Wt 148 kg (326 lb)   BMI 49.57 kg/m²       Labs:  Lab Results   Component Value Date    WBC 11.4 (H) 08/15/2023    NRBC CANCELED 08/15/2023    RBC 5.79 08/15/2023    HGB 17.3 08/15/2023    HCT 53.6 (H) 08/15/2023     08/15/2023     Lab Results   Component Value Date    CALCIUM 9.1 08/15/2023    AST 20 08/15/2023    ALKPHOS 93 08/15/2023    BILITOT 0.5 08/15/2023    PROT 7.7 08/15/2023    ALBUMIN 4.1 08/15/2023     08/15/2023    K 3.9 08/15/2023    CL 99 08/15/2023    CO2 28 08/15/2023    ANIONGAP 14 08/15/2023    BUN 24 (H) 08/15/2023    CREATININE 1.01 08/15/2023    GLUCOSE 194 (H) 08/15/2023    ALT 25  08/15/2023     Lab Results   Component Value Date    CHOL 205 (H) 08/15/2023    TRIG 344 (H) 08/15/2023    HDL 44.2 08/15/2023     Lab Results   Component Value Date    TSH 4.03 (H) 08/15/2023         Physical Exam   General Appearance: pleasant, cooperative, no acute distress  HEENT: no chemosis, no proptosis, no lid lag, no lid retraction  Neck: no lymphadenopathy, no thyromegaly, no dominant thyroid nodules  Heart: no murmurs, regular rate and rhythm, S1 and S2  Lungs: no wheezes, no rhonci, no rales  Extremities: 1-2+ edema BLE L>R      Assessment/Plan   1. Type 2 diabetes mellitus with diabetic polyneuropathy, with long-term current use of insulin (CMS/[facility])  -basal insulin 120 increase 124 units reviewed how to adjust based on waking bs  -not always using meal time dosing, forgets, or takes it late  -tolerating GLP1 at 1 mg dose   -tolerating Jardiance  -reinforced carbs per meal/snacks  -declined seeing diabetic educator  -follow up 2 months      HPI             Current Outpatient Medications:     albuterol 90 mcg/actuation inhaler, 2 puffs every 4 hours if needed for shortness of breath or wheezing (cough)., Disp: , Rfl:     baclofen (Lioresal) 10 mg tablet, Take 1 tablet (10 mg) by mouth 3 times a day., Disp: 90 tablet, Rfl: 2    blood-glucose sensor (FreeStyle Casandra 3 Sensor) device, Apply one sensor to the skin every 14 days to test blood sugars, Disp: 2 each, Rfl: 11    cephalexin (Keflex) 500 mg capsule, Take 1 capsule (500 mg) by mouth 2 times a day., Disp: 60 capsule, Rfl: 2    empagliflozin (Jardiance) 25 mg, Take 1 tablet (25 mg) by mouth once daily., Disp: 30 tablet, Rfl: 11    FreeStyle glucose monitoring (FreeStyle Lite Meter) kit, 1 each., Disp: , Rfl:     FreeStyle Lite Strips strip, 4 times a day., Disp: , Rfl:     furosemide (Lasix) 80 mg tablet, Take 1 tablet (80 mg) by mouth 2 times a day. (Patient taking differently: Take 100 mg by mouth 2 times a day.), Disp: 180 tablet, Rfl: 3    HumaLOG  "KwikPen Insulin 100 unit/mL injection, Use as directed with meals up to 110 units a day disp 105 ml 90 day supply, Disp: 105 mL, Rfl: 3    insulin glargine (Toujeo Max U-300 SoloStar) 300 unit/mL (3 mL) injection, INJECT UP  UNITS UNDER THE SKIN EVERY DAY, Disp: 36 mL, Rfl: 3    metoprolol tartrate (Lopressor) 25 mg tablet, Take 1 tablet (25 mg) by mouth 2 times a day., Disp: 180 tablet, Rfl: 0    naloxone (Narcan) 4 mg/0.1 mL nasal spray, Administer into affected nostril(s)., Disp: , Rfl:     nystatin (Mycostatin) cream, APPLY AND RUB IN A THIN LAYER TOPICALLY TO THE AFFECTED AREA TWICE DAILY IN THE MORNING AND IN THE EVENING, Disp: , Rfl:     oxyCODONE (Roxicodone) 10 mg immediate release tablet, Take 1 tablet (10 mg) by mouth 4 times a day as needed for moderate pain (4 - 6) for up to 28 days. Do not start before March 22, 2024., Disp: 112 tablet, Rfl: 0    pen needle, diabetic (BD Ultra-Fine Short Pen Needle) 31 gauge x 5/16\" needle, Use as directed, Disp: , Rfl:     pregabalin (Lyrica) 150 mg capsule, Take 1 capsule (150 mg) by mouth 3 times a day. Do not start before February 16, 2024., Disp: 90 capsule, Rfl: 2    rosuvastatin (Crestor) 10 mg tablet, Take 1 tablet (10 mg) by mouth once daily., Disp: 90 tablet, Rfl: 3    sodium hyaluronate (Durolane) 60 mg/3 mL injection, INJECT RIGHT KNEE 1 TIME; QTY 1 X 3 ML SYRINGE, Disp: 3 mL, Rfl: 0    Symbicort 160-4.5 mcg/actuation inhaler, Inhale 2 puffs 2 times a day., Disp: 1 each, Rfl: 11    diclofenac sodium (Voltaren) 1 % gel gel, Apply 1 Application topically 4 times a day as needed (as needed)., Disp: 100 g, Rfl: 2    oxyCODONE (Roxicodone) 10 mg immediate release tablet, Take 1 tablet (10 mg) by mouth 4 times a day as needed for moderate pain (4 - 6) for up to 28 days. Do not start before January 26, 2024., Disp: 112 tablet, Rfl: 0    oxyCODONE (Roxicodone) 10 mg immediate release tablet, Take 1 tablet (10 mg) by mouth 4 times a day as needed for moderate " "pain (4 - 6) for up to 28 days. Do not start before February 23, 2024., Disp: 112 tablet, Rfl: 0    semaglutide (Ozempic) 1 mg/dose (4 mg/3 mL) pen injector, Inject 1 mg under the skin 1 (one) time per week., Disp: 3 mL, Rfl: 6      Allergies as of 04/16/2024 - Reviewed 04/16/2024   Allergen Reaction Noted    Terbinafine Unknown 03/21/2023    Penicillins Rash 03/21/2023         Review of Systems   Cardiology: Lightheadedness-denies.  Chest pain-denies.  Leg edema-denies.  Palpitations-denies.  Respiratory: Cough-denies. Shortness of breath-denies.  Wheezing-denies.  Gastroenterology: Constipation-denies.  Diarrhea-denies.  Heartburn-denies.  Endocrinology: Cold intolerance-denies.  Heat intolerance-denies.  Sweats-denies.  Neurology: Headache-denies.  Tremor-denies.  Neuropathy in extremities-denies.  Psychology: Low energy-denies.  Irritability-denies.  Sleep disturbances-denies.      /68 (BP Location: Left arm, Patient Position: Sitting)   Pulse 76   Wt 148 kg (326 lb)   BMI 49.57 kg/m²       Labs:  Lab Results   Component Value Date    WBC 11.4 (H) 08/15/2023    NRBC CANCELED 08/15/2023    RBC 5.79 08/15/2023    HGB 17.3 08/15/2023    HCT 53.6 (H) 08/15/2023     08/15/2023     Lab Results   Component Value Date    CALCIUM 9.1 08/15/2023    AST 20 08/15/2023    ALKPHOS 93 08/15/2023    BILITOT 0.5 08/15/2023    PROT 7.7 08/15/2023    ALBUMIN 4.1 08/15/2023     08/15/2023    K 3.9 08/15/2023    CL 99 08/15/2023    CO2 28 08/15/2023    ANIONGAP 14 08/15/2023    BUN 24 (H) 08/15/2023    CREATININE 1.01 08/15/2023    GLUCOSE 194 (H) 08/15/2023    ALT 25 08/15/2023     Lab Results   Component Value Date    CHOL 205 (H) 08/15/2023    TRIG 344 (H) 08/15/2023    HDL 44.2 08/15/2023     No results found for: \"MICROALBCREA\"  Lab Results   Component Value Date    TSH 4.03 (H) 08/15/2023     No results found for: \"[id number]\"  Lab Results   Component Value Date    HGBA1C 10.0 (A) 04/16/2024 "         Physical Exam   General Appearance: pleasant, cooperative, no acute distress  HEENT: no chemosis, no proptosis, no lid lag, no lid retraction  Neck: no lymphadenopathy, no thyromegaly, no dominant thyroid nodules  Heart: no murmurs, regular rate and rhythm, S1 and S2  Lungs: no wheezes, no rhonci, no rales  Extremities: no lower extremity swelling      Assessment/Plan   1. Type 2 diabetes mellitus with diabetic polyneuropathy, with long-term current use of insulin (Multi)  -good decrease in the A1c past 2 months  -tolerating SGLT2  -tolerating GLP1 at 1 mg plan to up titrate at follow up   -adjustment in basal insulin and scale will add 2-4 units if post prandial BS not under 200  -basal insulin 120 increase 124 units reviewed how to adjust based on waking bs  -not always using meal time dosing, forgets, or takes it late  -reinforced carbs per meal/snacks  -declined seeing diabetic educator    - POCT glycosylated hemoglobin (Hb A1C) manually resulted    2. Type 2 diabetes mellitus with hyperglycemia, with long-term current use of insulin (Multi)    3. Mixed hyperlipidemia  -tolerating statin  -due for labs    4. Primary hypertension  -stable    5. Abnormal TSH  -due for labs    Follow Up: 3 months CNP    -labs/tests/notes reviewed  -reviewed and counseled patient on medication monitoring and side effects  Medical Decision Making  Complexity of problem: Chronic illness of diabetes mellitus uncontrolled, progressing  Data analyzed and reviewed: Reviewed prior notes, blood glucose data, labs including HgbA1c, lipids, serum chemistries.  Ordered tests.   Risk of complications and morbidities: Is definite because of use of insulin and risk of hypoglycemia.  Prescription medications reviewed and modifications made.  Compliance assessed.  Addressed social determinants of health including food insecurity.

## 2024-04-16 NOTE — PATIENT INSTRUCTIONS
TOUJEO 124 UNITS DAILY     IF YOUR AFTER MEALS YOUR BLOOD SUGARS ARE STILL ABOVE 200 2 HOURS AFTER YOU EAT IN ADD 4 UNITS TO YOUR COVERAGE

## 2024-04-17 ENCOUNTER — OFFICE VISIT (OUTPATIENT)
Dept: PAIN MEDICINE | Facility: CLINIC | Age: 64
End: 2024-04-17
Payer: MEDICARE

## 2024-04-17 VITALS
RESPIRATION RATE: 20 BRPM | OXYGEN SATURATION: 92 % | HEART RATE: 74 BPM | WEIGHT: 315 LBS | SYSTOLIC BLOOD PRESSURE: 122 MMHG | DIASTOLIC BLOOD PRESSURE: 74 MMHG | HEIGHT: 69 IN | BODY MASS INDEX: 46.65 KG/M2

## 2024-04-17 DIAGNOSIS — M17.0 PRIMARY OSTEOARTHRITIS OF BOTH KNEES: Primary | ICD-10-CM

## 2024-04-17 PROCEDURE — 1159F MED LIST DOCD IN RCRD: CPT | Performed by: NURSE PRACTITIONER

## 2024-04-17 PROCEDURE — 3008F BODY MASS INDEX DOCD: CPT | Performed by: NURSE PRACTITIONER

## 2024-04-17 PROCEDURE — 99214 OFFICE O/P EST MOD 30 MIN: CPT | Performed by: NURSE PRACTITIONER

## 2024-04-17 PROCEDURE — 1125F AMNT PAIN NOTED PAIN PRSNT: CPT | Performed by: NURSE PRACTITIONER

## 2024-04-17 PROCEDURE — 3074F SYST BP LT 130 MM HG: CPT | Performed by: NURSE PRACTITIONER

## 2024-04-17 PROCEDURE — 1160F RVW MEDS BY RX/DR IN RCRD: CPT | Performed by: NURSE PRACTITIONER

## 2024-04-17 PROCEDURE — 1123F ACP DISCUSS/DSCN MKR DOCD: CPT | Performed by: NURSE PRACTITIONER

## 2024-04-17 PROCEDURE — 3078F DIAST BP <80 MM HG: CPT | Performed by: NURSE PRACTITIONER

## 2024-04-17 PROCEDURE — 1036F TOBACCO NON-USER: CPT | Performed by: NURSE PRACTITIONER

## 2024-04-17 RX ORDER — OXYCODONE HYDROCHLORIDE 10 MG/1
10 TABLET ORAL EVERY 6 HOURS PRN
Qty: 112 TABLET | Refills: 0 | Status: SHIPPED | OUTPATIENT
Start: 2024-04-26 | End: 2024-05-24

## 2024-04-17 RX ORDER — PREGABALIN 150 MG/1
150 CAPSULE ORAL 3 TIMES DAILY
Qty: 90 CAPSULE | Refills: 2 | Status: SHIPPED | OUTPATIENT
Start: 2024-04-17

## 2024-04-17 RX ORDER — OXYCODONE HYDROCHLORIDE 10 MG/1
10 TABLET ORAL EVERY 6 HOURS PRN
Qty: 112 TABLET | Refills: 0 | Status: SHIPPED | OUTPATIENT
Start: 2024-05-24 | End: 2024-06-21

## 2024-04-17 RX ORDER — OXYCODONE HYDROCHLORIDE 10 MG/1
10 TABLET ORAL EVERY 6 HOURS PRN
Qty: 112 TABLET | Refills: 0 | Status: SHIPPED | OUTPATIENT
Start: 2024-06-21 | End: 2024-07-19

## 2024-04-17 RX ORDER — BACLOFEN 10 MG/1
10 TABLET ORAL 3 TIMES DAILY
Qty: 90 TABLET | Refills: 2 | Status: SHIPPED | OUTPATIENT
Start: 2024-04-17

## 2024-04-17 ASSESSMENT — ENCOUNTER SYMPTOMS
SPEECH DIFFICULTY: 0
NECK STIFFNESS: 0
FACIAL ASYMMETRY: 0
WEAKNESS: 1
LIGHT-HEADEDNESS: 0
TREMORS: 0
HEMATOLOGIC/LYMPHATIC NEGATIVE: 1
GASTROINTESTINAL NEGATIVE: 1
HEADACHES: 0
NECK PAIN: 0
RESPIRATORY NEGATIVE: 1
PSYCHIATRIC NEGATIVE: 1
JOINT SWELLING: 1
CONSTITUTIONAL NEGATIVE: 1
BACK PAIN: 1
SEIZURES: 0
EYES NEGATIVE: 1
DIZZINESS: 0
ARTHRALGIAS: 1
ENDOCRINE NEGATIVE: 1
MYALGIAS: 1
HIP PAIN: 1
ALLERGIC/IMMUNOLOGIC NEGATIVE: 1
CARDIOVASCULAR NEGATIVE: 1
NUMBNESS: 0

## 2024-04-17 ASSESSMENT — PAIN - FUNCTIONAL ASSESSMENT: PAIN_FUNCTIONAL_ASSESSMENT: 0-10

## 2024-04-17 ASSESSMENT — PAIN DESCRIPTION - DESCRIPTORS: DESCRIPTORS: SHARP

## 2024-04-17 ASSESSMENT — PAIN SCALES - GENERAL
PAINLEVEL: 6
PAINLEVEL_OUTOF10: 6

## 2024-04-17 NOTE — PROGRESS NOTES
Subjective   Patient ID: Jayy Coles is a 63 y.o. male who presents for chronic pain management.    Hip Pain   Pertinent negatives include no numbness.   Knee Pain   Pertinent negatives include no numbness.   Back Pain  Associated symptoms include weakness. Pertinent negatives include no headaches or numbness.       Jayy follows up for interval reevaluation of his chronic left knee pain and is status post surgeries x7 to the knee. He is with chronic knee pain of aching and stiffness but also with burning sharp pain to the knee. Does have weakness. No falls since last office visit.    History of right intra-articular injection from orthopedic surgeon 2/28/2024 help to reduce pain and improve function up to 50%.      Oxycodone 10 mg 4 times a day along with Lyrica 150 mg 3 times a day helps to reduce pain and improve function to the left knee up to 100% for several hours. Average pain score is 6-7 out of 10 with medication. Denies negative side effects from medication.     Has an average quality of family and social life with current condition and treatment. Has not been to the ED for pain since last office visit.     Toxicology consistent February 7, 2023.  Toxicology today.  Annual controlled substance agreement and opioid risk tool are completed and scanned into the chart January 18, 2024.    For continuity:   Given the patient's report of reduced pain and improved functional ability without adverse effects, it is reasonable to treat with narcotic medications. The terms of the opioid agreement as well as the potential risks and adverse effects of the patient's medication regimen were discussed in detail. This includes if applicable due to dosage of medication permission to discuss and coordinate care with other treatment providers relevant to the patients condition. The patient verbalized understanding.     Risks and side effects of chronic opioid therapy including but not limited to tolerance, dependence,  constipation, hyperalgesia, cognitive side effects, addiction and possible death due to overuse and or misuse were discussed. I also discussed that such medications when co-administered with other sedative agents including but not limited to alcohol, benzodiazepines, sedative hypnotics and illegal drugs could pose life threatening consequences including death. I also explained the impact that the administration of such medication has on a patient with obstructive sleep apnea and continued recommendations for use of apnea devices if ordered are prescribed by other physicians. In order to effectively and safely treat the pain, I also emphasized the importance of compliance with the treatment plan, as well as compliance with the terms of the opioid agreement, which was reviewed in detail. I explained the importance of being responsible with the medications and to take these only as prescribed, never in excess and never for reasons other than pain reduction. The patient was counseled on keeping the medications safe and locked away from children and other adults as well as disposal methods and options. The patient understood the risks and instructions.     I also discussed with the patient in detail that based on the clinical response to the opioid medications and improvements of activities of daily living, sleep, and work performance in light of compliance with the treatment plan we can continue this form of therapy for the above chronic pain. The goal and rationale used for current treatment with chronic opioid medication is to control the pain and alleviate disability induced by the chronic pain condition noted above after failures of other non-opioid and nonpharmacological modalities to treat the chronic pain and the symptoms associated with have failed. The patient understood the goals in terms of the above treatment plan and had no further questions prior to leaving the office today.     Of note, the  above-mentioned diagnoses/conditions and expected fluctuating nature of pain, and pain characteristic changes may lead to prolonged functional impairment requiring frequent and multiple reassessments with continued high level medical decision making. As noted, medication and medication management may require opiate therapy in excess of a routine less than 30 day medication requirement. The patient may require daily opiate therapy necessitating month-long prescription medication as noted above in order to perform activities of daily living and achieve acceptable quality of life with respect to their chronic pain condition for the foreseeable future. We monitor our patient's carefully through drug monitoring, medication counts, urine drug testing specific to their medication as well as a myriad of other substances and with frequent follow-ups with interval reassement of the chronic pain condition, its pathophysiology and prognosis.     The level of clinical decision making at this office visit is high due to high risks and complications including mortality and morbidity related to acute and chronic pain with respects to life, bodily function, and treatment. Risks and clinical decisions with respect to under treatment, failure to maintain adequate treatment, and/or overtreatment complications and outcomes were discussed with the patient with respect to their chronic pain conditions, interventional therapies, as well as the use of various medications including possible controlled/dangerous medications. The amount and complexity of reviewed data at this in subsequent office visits is high given patient's fluctuating clinical presentation, laboratory and radiographic reports, prescription monitoring program data, and medication history as well as other relevant data as noted above. Pertinent negatives and positives data was used in consideration for the above-mentioned high complexity.      Given the patient's total MED,  general use of daily opiates, or other coadministered medications in various classes the patient was offered a prescription for Narcan. I instructed the patient that it is important that patient fill this medication in order to demonstrate understanding of the gravity of possible side effects including respiratory depression and risk of overdose of this opiate load or medication combination. As such patient will be required to bring Narcan prescription to follow-up appointments as part of compliance with continued opiate care.     With respect to opiate induced constipation I discussed multiple ways to combat this problem including staying hydrated and taking over-the-counter medications such as Dulcolax, Miralax and Senna. If these treatments are not effective we could consider such medications as Amitiza, Linzess and Movantik.     Disclaimer: This note was transcribed using an audio transcription device. As such, minor errors may be present with regard to spelling, punctuation, and inadvertent word insertion. Please disregard such errors.    Results/Data  Xray Knee Complete 4 or more View [id number] 10:53AM Carl Mosquera      Test Name Result Flag Reference   Xray Knee Complete 4 or more View (Report)       FINAL REPORT  Interpreted by: DAPHNE PATHAK CHRISTOPHER, MD   05/12/23 08:11  MRN: [id number]  Patient Name: LAWRENCE VILLALOBOS     STUDY:  KNEE; COMPLT, 4 OR MORE VIEWS     INDICATION:  AP WB bilat in one shot , PA 30 degree flex WB bilat in one shot (no  orthoball), LAT merchant view 30 degree flex bilat in one shot (no  orthoball) M25.562: Chronic pain of left knee G89.29:.     COMPARISON:  May 5, 2022     ACCESSION NUMBER(S):  [id number]     ORDERING CLINICIAN:  CARL MOSQUERA     FINDINGS:  Long-stem revision left total knee arthroplasty. Appearance is  unchanged from prior examination. Prior medial femoral condylar  fracture unchanged. No new lucencies or malalignment.     IMPRESSION:  Unchanged appearance of  the long-stem revision left total knee  arthroplasty without evidence of new complication.     Electronically signed by: DAPHNE PATHAK  05/12/23 08:11       Review of Systems   Constitutional: Negative.    HENT: Negative.     Eyes: Negative.    Respiratory: Negative.          Awaiting order for portable oxygen machine from insurance.  Is with shortness of breath during exertion such as walking and getting up around about.   Cardiovascular: Negative.    Gastrointestinal: Negative.    Endocrine: Negative.    Genitourinary: Negative.    Musculoskeletal:  Positive for arthralgias, back pain, gait problem, joint swelling and myalgias. Negative for neck pain and neck stiffness.   Skin: Negative.    Allergic/Immunologic: Negative.    Neurological:  Positive for weakness. Negative for dizziness, tremors, seizures, syncope, facial asymmetry, speech difficulty, light-headedness, numbness and headaches.   Hematological: Negative.    Psychiatric/Behavioral: Negative.         Objective   Physical Exam  Vitals and nursing note reviewed.   Constitutional:       Appearance: Normal appearance.   HENT:      Head: Normocephalic and atraumatic.   Eyes:      Conjunctiva/sclera: Conjunctivae normal.   Cardiovascular:      Pulses: Normal pulses.   Pulmonary:      Effort: Pulmonary effort is normal. No respiratory distress.      Comments: Wearing portable oxygen 2 L nasal cannula.  Musculoskeletal:      Right lower leg: Edema present.      Left lower leg: Edema present.      Comments: Trace to +1 pitting edema to ankles and pretibial areas.    Right knee with pain during extension and flexion.  Joint line tenderness to palpation.    Right knee with generalized mild swelling.    Left knee with pain during extension and flexion.  Joint line tenderness to palpation.    Pain with palpation over the well-healed left knee linear scar.    Generalized mild swelling.    Ambulates with mildly antalgic gait.   Skin:     General: Skin is warm and dry.       Capillary Refill: Capillary refill takes 2 to 3 seconds.   Neurological:      Mental Status: He is alert and oriented to person, place, and time.      Cranial Nerves: Cranial nerve deficit present.      Sensory: Sensory deficit present.      Motor: Weakness present.      Gait: Gait abnormal.   Psychiatric:         Mood and Affect: Mood normal.         Behavior: Behavior normal.       Assessment/Plan     Follow-up 12 weeks.  Continue with medication as prescribed of oxycodone 10 mg up to 4 times daily as needed, pregabalin 150 mg 3 times daily and baclofen 10 mg up to 3 times daily.    Reviewed and approved by MALLIKA NEIL on 4/17/24 at 10:38 AM.

## 2024-04-24 DIAGNOSIS — E11.42 TYPE 2 DIABETES MELLITUS WITH DIABETIC POLYNEUROPATHY, WITH LONG-TERM CURRENT USE OF INSULIN (MULTI): ICD-10-CM

## 2024-04-24 DIAGNOSIS — Z79.4 TYPE 2 DIABETES MELLITUS WITH DIABETIC POLYNEUROPATHY, WITH LONG-TERM CURRENT USE OF INSULIN (MULTI): ICD-10-CM

## 2024-04-24 RX ORDER — SEMAGLUTIDE 1.34 MG/ML
INJECTION, SOLUTION SUBCUTANEOUS
Qty: 9 ML | Refills: 1 | Status: SHIPPED | OUTPATIENT
Start: 2024-04-24

## 2024-04-25 ENCOUNTER — APPOINTMENT (OUTPATIENT)
Dept: NEUROSURGERY | Facility: CLINIC | Age: 64
End: 2024-04-25
Payer: MEDICARE

## 2024-04-30 ENCOUNTER — TELEMEDICINE (OUTPATIENT)
Dept: NEUROLOGY | Facility: HOSPITAL | Age: 64
End: 2024-04-30
Payer: MEDICARE

## 2024-04-30 DIAGNOSIS — R51.9 WORSENING HEADACHES: ICD-10-CM

## 2024-04-30 DIAGNOSIS — R51.9 CHRONIC DAILY HEADACHE: Primary | ICD-10-CM

## 2024-04-30 DIAGNOSIS — R51.9 ACHING HEADACHE: ICD-10-CM

## 2024-04-30 DIAGNOSIS — R51.9 NEW ONSET OF HEADACHES AFTER AGE 50: ICD-10-CM

## 2024-04-30 DIAGNOSIS — R93.0 ABNORMAL CT OF THE HEAD: ICD-10-CM

## 2024-04-30 PROCEDURE — 1123F ACP DISCUSS/DSCN MKR DOCD: CPT | Performed by: PSYCHIATRY & NEUROLOGY

## 2024-04-30 PROCEDURE — 99204 OFFICE O/P NEW MOD 45 MIN: CPT | Performed by: PSYCHIATRY & NEUROLOGY

## 2024-04-30 PROCEDURE — 99214 OFFICE O/P EST MOD 30 MIN: CPT | Mod: GT,95 | Performed by: PSYCHIATRY & NEUROLOGY

## 2024-04-30 PROCEDURE — 1160F RVW MEDS BY RX/DR IN RCRD: CPT | Performed by: PSYCHIATRY & NEUROLOGY

## 2024-04-30 PROCEDURE — 1159F MED LIST DOCD IN RCRD: CPT | Performed by: PSYCHIATRY & NEUROLOGY

## 2024-04-30 PROCEDURE — 3008F BODY MASS INDEX DOCD: CPT | Performed by: PSYCHIATRY & NEUROLOGY

## 2024-04-30 PROCEDURE — G2211 COMPLEX E/M VISIT ADD ON: HCPCS | Performed by: PSYCHIATRY & NEUROLOGY

## 2024-04-30 RX ORDER — TOPIRAMATE 25 MG/1
TABLET ORAL
Qty: 60 TABLET | Refills: 0 | Status: SHIPPED | OUTPATIENT
Start: 2024-04-30 | End: 2024-05-21 | Stop reason: ALTCHOICE
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
## 2024-04-30 NOTE — PATIENT INSTRUCTIONS
Try topiramate 25mg at bedtime x1 week, then 25mg 2x/day  2.   Try to lower oxycodone dose  3.   Do MRI brain wo for worsening HA    Rtc after testing

## 2024-05-03 PROBLEM — R51.9 NEW ONSET OF HEADACHES AFTER AGE 50: Status: ACTIVE | Noted: 2024-05-03

## 2024-05-03 PROBLEM — R51.9 WORSENING HEADACHES: Status: ACTIVE | Noted: 2024-05-03

## 2024-05-03 PROBLEM — R93.0 ABNORMAL CT OF THE HEAD: Status: ACTIVE | Noted: 2024-05-03

## 2024-05-03 PROBLEM — R51.9 CHRONIC DAILY HEADACHE: Status: ACTIVE | Noted: 2024-05-03

## 2024-05-14 ENCOUNTER — TELEPHONE (OUTPATIENT)
Dept: NEUROLOGY | Facility: HOSPITAL | Age: 64
End: 2024-05-14
Payer: MEDICARE

## 2024-05-14 NOTE — TELEPHONE ENCOUNTER
Pt reports he increased Topiramate dose to  BID Monday and became dyspneic despite being on O2, lightheaded and confused.  Stopped taking it.  States he did ok on the every day dose but no headache relief.    Thank you.

## 2024-05-20 ENCOUNTER — HOSPITAL ENCOUNTER (OUTPATIENT)
Dept: RADIOLOGY | Facility: HOSPITAL | Age: 64
Discharge: HOME | End: 2024-05-20
Payer: MEDICARE

## 2024-05-20 DIAGNOSIS — R51.9 WORSENING HEADACHES: ICD-10-CM

## 2024-05-20 DIAGNOSIS — R51.9 NEW ONSET OF HEADACHES AFTER AGE 50: ICD-10-CM

## 2024-05-20 DIAGNOSIS — R51.9 CHRONIC DAILY HEADACHE: ICD-10-CM

## 2024-05-20 PROCEDURE — 70551 MRI BRAIN STEM W/O DYE: CPT | Performed by: RADIOLOGY

## 2024-05-20 PROCEDURE — 70551 MRI BRAIN STEM W/O DYE: CPT

## 2024-05-21 ENCOUNTER — OFFICE VISIT (OUTPATIENT)
Dept: NEUROLOGY | Facility: HOSPITAL | Age: 64
End: 2024-05-21
Payer: MEDICARE

## 2024-05-21 VITALS — HEART RATE: 80 BPM | DIASTOLIC BLOOD PRESSURE: 80 MMHG | SYSTOLIC BLOOD PRESSURE: 154 MMHG

## 2024-05-21 DIAGNOSIS — R90.89 ABNORMAL BRAIN MRI: ICD-10-CM

## 2024-05-21 DIAGNOSIS — Z79.899 ENCOUNTER FOR MEDICATION MANAGEMENT: ICD-10-CM

## 2024-05-21 DIAGNOSIS — G44.221 CHRONIC TENSION-TYPE HEADACHE, INTRACTABLE: Primary | ICD-10-CM

## 2024-05-21 DIAGNOSIS — R51.9 CHRONIC DAILY HEADACHE: ICD-10-CM

## 2024-05-21 PROCEDURE — 99214 OFFICE O/P EST MOD 30 MIN: CPT | Performed by: PSYCHIATRY & NEUROLOGY

## 2024-05-21 PROCEDURE — 3077F SYST BP >= 140 MM HG: CPT | Performed by: PSYCHIATRY & NEUROLOGY

## 2024-05-21 PROCEDURE — 1160F RVW MEDS BY RX/DR IN RCRD: CPT | Performed by: PSYCHIATRY & NEUROLOGY

## 2024-05-21 PROCEDURE — 1125F AMNT PAIN NOTED PAIN PRSNT: CPT | Performed by: PSYCHIATRY & NEUROLOGY

## 2024-05-21 PROCEDURE — 1123F ACP DISCUSS/DSCN MKR DOCD: CPT | Performed by: PSYCHIATRY & NEUROLOGY

## 2024-05-21 PROCEDURE — 3079F DIAST BP 80-89 MM HG: CPT | Performed by: PSYCHIATRY & NEUROLOGY

## 2024-05-21 PROCEDURE — 1036F TOBACCO NON-USER: CPT | Performed by: PSYCHIATRY & NEUROLOGY

## 2024-05-21 PROCEDURE — 1159F MED LIST DOCD IN RCRD: CPT | Performed by: PSYCHIATRY & NEUROLOGY

## 2024-05-21 PROCEDURE — 3008F BODY MASS INDEX DOCD: CPT | Performed by: PSYCHIATRY & NEUROLOGY

## 2024-05-21 ASSESSMENT — PATIENT HEALTH QUESTIONNAIRE - PHQ9
SUM OF ALL RESPONSES TO PHQ9 QUESTIONS 1 AND 2: 2
10. IF YOU CHECKED OFF ANY PROBLEMS, HOW DIFFICULT HAVE THESE PROBLEMS MADE IT FOR YOU TO DO YOUR WORK, TAKE CARE OF THINGS AT HOME, OR GET ALONG WITH OTHER PEOPLE: SOMEWHAT DIFFICULT
2. FEELING DOWN, DEPRESSED OR HOPELESS: SEVERAL DAYS
1. LITTLE INTEREST OR PLEASURE IN DOING THINGS: SEVERAL DAYS

## 2024-05-21 ASSESSMENT — COLUMBIA-SUICIDE SEVERITY RATING SCALE - C-SSRS
1. IN THE PAST MONTH, HAVE YOU WISHED YOU WERE DEAD OR WISHED YOU COULD GO TO SLEEP AND NOT WAKE UP?: NO
6. HAVE YOU EVER DONE ANYTHING, STARTED TO DO ANYTHING, OR PREPARED TO DO ANYTHING TO END YOUR LIFE?: NO
2. HAVE YOU ACTUALLY HAD ANY THOUGHTS OF KILLING YOURSELF?: NO

## 2024-05-21 ASSESSMENT — ENCOUNTER SYMPTOMS
OCCASIONAL FEELINGS OF UNSTEADINESS: 1
LOSS OF SENSATION IN FEET: 1
DEPRESSION: 1

## 2024-05-21 ASSESSMENT — PAIN SCALES - GENERAL: PAINLEVEL: 6

## 2024-05-21 NOTE — PATIENT INSTRUCTIONS
? Neurosurgery consult for brain mass--stable--pt mentions name of Dr Amor Herrera  Try massage therapy for head pain/scalp  3.   Stop topiramate  4.   Double check instructions on topiramate medicine bottle and let my office know, also to call pharmacy if with discrepancy found    Rtc as needed
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
## 2024-05-22 ENCOUNTER — TELEPHONE (OUTPATIENT)
Dept: NEUROLOGY | Facility: HOSPITAL | Age: 64
End: 2024-05-22
Payer: MEDICARE

## 2024-05-22 NOTE — TELEPHONE ENCOUNTER
----- Message from Candi Evans RN sent at 5/22/2024 10:29 AM EDT -----  Regarding: Pt states you asked him to call with the medication bottle info (Topiramate)  Topiramate 25 mg, 1 tab q hs x 1 week, then 2 tabs BID.

## 2024-05-23 ENCOUNTER — TELEPHONE (OUTPATIENT)
Dept: NEUROLOGY | Facility: HOSPITAL | Age: 64
End: 2024-05-23
Payer: MEDICARE

## 2024-05-23 NOTE — TELEPHONE ENCOUNTER
I contacted pt to update him.  States will call pharmacy about improper dose of Topiramate.      Verbalized understanding and satisfaction.

## 2024-05-23 NOTE — TELEPHONE ENCOUNTER
----- Message from Curtis Zhao MD sent at 5/22/2024 10:51 AM EDT -----  Regarding: RE: Pt states you asked him to call with the medication bottle info (Topiramate)  Noted. Let them know they have to call their pharmacy as the script I sent last time was for 1 tab at bedtime x1 week then 1 tab bid.    Why did the pharmacy change it to 2 tabs bid? Anyway, I took him off because he had SE, but the pharmacy should know about this discrepancy on the pharmacy's end when they prescribed it.    Please save this as a phone call.    Dr zhao    ----- Message -----  From: Candi Evans RN  Sent: 5/22/2024  10:31 AM EDT  To: Curtis Zhao MD  Subject: Pt states you asked him to call with the med#    Topiramate 25 mg, 1 tab q hs x 1 week, then 2 tabs BID.

## 2024-05-24 ENCOUNTER — TELEPHONE (OUTPATIENT)
Dept: NEUROLOGY | Facility: HOSPITAL | Age: 64
End: 2024-05-24
Payer: MEDICARE

## 2024-05-24 ENCOUNTER — DOCUMENTATION (OUTPATIENT)
Dept: NEUROLOGY | Facility: HOSPITAL | Age: 64
End: 2024-05-24
Payer: MEDICARE

## 2024-05-24 PROBLEM — Z79.899 ENCOUNTER FOR MEDICATION MANAGEMENT: Status: ACTIVE | Noted: 2024-05-24

## 2024-05-24 PROBLEM — R90.89 ABNORMAL BRAIN MRI: Status: ACTIVE | Noted: 2024-05-24

## 2024-05-24 PROBLEM — R51.9 NEW ONSET OF HEADACHES AFTER AGE 50: Status: RESOLVED | Noted: 2024-05-03 | Resolved: 2024-05-24

## 2024-05-24 PROBLEM — R51.9 WORSENING HEADACHES: Status: RESOLVED | Noted: 2024-05-03 | Resolved: 2024-05-24

## 2024-05-24 NOTE — PROGRESS NOTES
"5/24/24 Neuro Brief Note    I called Romaine in [place] at [phone] just now about the topiramate issue. I called and spoke to a staff member, who transferred me to speak to a pharmacist.    I let them know the prescription was sent for topiramate 25mg at bedtime x1 week, and to increase to 25mg 2x/day--which the pharmacist confirmed as correct and that's what the pharmacy received.    I also let them know the patient told me his bottle instructions stated take topiramate 25mg at bedtime x1 week, and then increase to 25mg \"2 tabs 2x/day\", and that I had the patient/spouse double check the bottle when they got home and they did confirm it was what was written. I spoke to the pharmacist, who told me it was an inputting error (human error) on the pharmacy's end, that this will be reported, and that they (someone from the pharmacy) will be calling the patient about this. I let the pharmacist know that patient reported side effects, and that we had already stopped the medication.    Dr Brunner  "

## 2024-05-24 NOTE — TELEPHONE ENCOUNTER
"----- Message from Curtis Brunner MD sent at 5/24/2024  2:24 PM EDT -----  Regarding: RE: Pt states you asked him to call with the medication bottle info (Topiramate)  Please let pt know. I also documented this in the chart.    I called Romaine in [place] at [phone] just now about the topiramate issue. I called and spoke to a staff member, who transferred me to speak to a pharmacist.    I let them know the prescription was sent for topiramate 25mg at bedtime x1 week, and to increase to 25mg 2x/day--which the pharmacist confirmed as correct and that's what the pharmacy received.    I also let them know the patient told me his bottle instructions stated take topiramate 25mg at bedtime x1 week, and then increase to 25mg \"2 tabs 2x/day\", and that I had the patient/spouse double check the bottle when they got home and they did confirm it was what was written (2 tabs 2x/day). I spoke to the pharmacist, who told me it was an inputting error (human error) on the pharmacy's end, that this will be reported, and that they (someone from the pharmacy) will be calling the patient about this. I let the pharmacist know that patient reported side effects, and that we had already stopped the medication.    Dr Brunner    ----- Message -----  From: Candi Evans RN  Sent: 5/22/2024  10:31 AM EDT  To: Curtis Brunner MD  Subject: Pt states you asked him to call with the med#    Topiramate 25 mg, 1 tab q hs x 1 week, then 2 tabs BID.      "

## 2024-06-06 ENCOUNTER — APPOINTMENT (OUTPATIENT)
Dept: CARDIOLOGY | Facility: CLINIC | Age: 64
End: 2024-06-06
Payer: MEDICARE

## 2024-06-13 ENCOUNTER — APPOINTMENT (OUTPATIENT)
Dept: CARDIOLOGY | Facility: CLINIC | Age: 64
End: 2024-06-13
Payer: MEDICARE

## 2024-06-19 ENCOUNTER — OFFICE VISIT (OUTPATIENT)
Dept: PULMONOLOGY | Facility: CLINIC | Age: 64
End: 2024-06-19
Payer: MEDICARE

## 2024-06-19 VITALS
SYSTOLIC BLOOD PRESSURE: 109 MMHG | BODY MASS INDEX: 46.2 KG/M2 | HEART RATE: 81 BPM | DIASTOLIC BLOOD PRESSURE: 58 MMHG | RESPIRATION RATE: 16 BRPM | WEIGHT: 315 LBS | OXYGEN SATURATION: 89 %

## 2024-06-19 DIAGNOSIS — U09.9 POST COVID-19 CONDITION, UNSPECIFIED: Primary | ICD-10-CM

## 2024-06-19 DIAGNOSIS — Z99.81 ON HOME OXYGEN THERAPY: ICD-10-CM

## 2024-06-19 DIAGNOSIS — G47.30 BREATHING-RELATED SLEEP DISORDER: ICD-10-CM

## 2024-06-19 DIAGNOSIS — J45.909 UNCOMPLICATED ASTHMA, UNSPECIFIED ASTHMA SEVERITY, UNSPECIFIED WHETHER PERSISTENT (HHS-HCC): ICD-10-CM

## 2024-06-19 PROCEDURE — 1126F AMNT PAIN NOTED NONE PRSNT: CPT | Performed by: INTERNAL MEDICINE

## 2024-06-19 PROCEDURE — 1159F MED LIST DOCD IN RCRD: CPT | Performed by: INTERNAL MEDICINE

## 2024-06-19 PROCEDURE — 3074F SYST BP LT 130 MM HG: CPT | Performed by: INTERNAL MEDICINE

## 2024-06-19 PROCEDURE — 1036F TOBACCO NON-USER: CPT | Performed by: INTERNAL MEDICINE

## 2024-06-19 PROCEDURE — 3008F BODY MASS INDEX DOCD: CPT | Performed by: INTERNAL MEDICINE

## 2024-06-19 PROCEDURE — 1123F ACP DISCUSS/DSCN MKR DOCD: CPT | Performed by: INTERNAL MEDICINE

## 2024-06-19 PROCEDURE — 3078F DIAST BP <80 MM HG: CPT | Performed by: INTERNAL MEDICINE

## 2024-06-19 PROCEDURE — 1160F RVW MEDS BY RX/DR IN RCRD: CPT | Performed by: INTERNAL MEDICINE

## 2024-06-19 PROCEDURE — 99213 OFFICE O/P EST LOW 20 MIN: CPT | Performed by: INTERNAL MEDICINE

## 2024-06-19 ASSESSMENT — ENCOUNTER SYMPTOMS
CHILLS: 0
SLEEPING PROBLEMS DURING THE LAST MONTH: 1
DEPRESSION: 0
COUGH: 0
FEVER: 0

## 2024-06-19 ASSESSMENT — COLUMBIA-SUICIDE SEVERITY RATING SCALE - C-SSRS
6. HAVE YOU EVER DONE ANYTHING, STARTED TO DO ANYTHING, OR PREPARED TO DO ANYTHING TO END YOUR LIFE?: NO
2. HAVE YOU ACTUALLY HAD ANY THOUGHTS OF KILLING YOURSELF?: NO
1. IN THE PAST MONTH, HAVE YOU WISHED YOU WERE DEAD OR WISHED YOU COULD GO TO SLEEP AND NOT WAKE UP?: NO

## 2024-06-19 ASSESSMENT — PAIN SCALES - GENERAL: PAINLEVEL: 0-NO PAIN

## 2024-06-19 NOTE — PROGRESS NOTES
Subjective   Patient ID: Jayy Coles is a 63 y.o. male who presents for Lung Eval.  h/o asthma (+methacholine ), mod restriction 3/2022 previously admitted w/ COVID here for f/u.  3L. Completed OR. CT-chest w/ stable GGOs.  Did not receive CPAP yet because could not get in contact w/ DME.   No fevers, chills.  Occasional cough.  LE edema has persists. On Symbicort. Uses rescue 3-4x/week. ET is limited by knee pain.     Sleeping Problem  Pertinent negatives include no chills, coughing, fever or rash.       Review of Systems   Constitutional:  Negative for chills and fever.   Respiratory:  Negative for cough.    Skin:  Negative for rash.       Objective   Physical Exam  Vitals reviewed.   Constitutional:       Appearance: Normal appearance. He is obese. He is ill-appearing.   HENT:      Head: Normocephalic and atraumatic.   Eyes:      Extraocular Movements: Extraocular movements intact.   Cardiovascular:      Rate and Rhythm: Normal rate and regular rhythm.      Heart sounds: Normal heart sounds.   Pulmonary:      Effort: Pulmonary effort is normal.      Comments: Coarse breath sounds bilaterally   Abdominal:      Palpations: Abdomen is soft.      Tenderness: There is no abdominal tenderness.   Musculoskeletal:      Cervical back: Normal range of motion.      Right lower le+ Pitting Edema present.      Left lower le+ Pitting Edema present.   Skin:     General: Skin is warm.   Neurological:      General: No focal deficit present.      Mental Status: He is alert and oriented to person, place, and time. Mental status is at baseline.   Psychiatric:         Mood and Affect: Mood normal.         Behavior: Behavior normal.         Assessment/Plan   Problem List Items Addressed This Visit       Asthma ([facility])     Mod restriction 3/2022. Cont Symbicort 160mcg. Repeat PFTs         On home oxygen therapy     Cont 3L.  Also some cardiac component.  Cont diuretics.         Post covid-19 condition, unspecified -  Primary     GGOs likely related to post COVID inflammation.  Stable on most recent imaging.         Breathing-related sleep disorder     Severe RICCI - Will reorder CPAP.  Check compliance next visit.          RTC in 3 months.    Time Spent  Prep time on day of patient encounter: 5 minutes  Time spent directly with patient, family or caregiver: 10 minutes  Additional Time Spent on Patient Care Activities: 0 minutes  Documentation Time: 5 minutes  Other Time Spent: 0 minutes  Total: 20 minutes        Dusty Vásquez MD 06/19/24 4:38 PM

## 2024-06-20 ENCOUNTER — APPOINTMENT (OUTPATIENT)
Dept: CARDIOLOGY | Facility: CLINIC | Age: 64
End: 2024-06-20
Payer: MEDICARE

## 2024-07-02 ENCOUNTER — TELEPHONE (OUTPATIENT)
Dept: ORTHOPEDIC SURGERY | Facility: CLINIC | Age: 64
End: 2024-07-02
Payer: MEDICARE

## 2024-07-02 DIAGNOSIS — Z96.652 STATUS POST REVISION OF TOTAL REPLACEMENT OF LEFT KNEE: ICD-10-CM

## 2024-07-02 NOTE — TELEPHONE ENCOUNTER
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 He would like a few refills so he doesn't have to call back monthly as he has to take this medication for life.    Romaine Moura [place], [place]

## 2024-07-03 RX ORDER — CEPHALEXIN 500 MG/1
500 CAPSULE ORAL 2 TIMES DAILY
Qty: 90 CAPSULE | Refills: 3 | Status: SHIPPED | OUTPATIENT
Start: 2024-07-03

## 2024-07-06 ENCOUNTER — PATIENT MESSAGE (OUTPATIENT)
Dept: ENDOCRINOLOGY | Facility: CLINIC | Age: 64
End: 2024-07-06
Payer: MEDICARE

## 2024-07-08 ENCOUNTER — TELEPHONE (OUTPATIENT)
Dept: ENDOCRINOLOGY | Facility: CLINIC | Age: 64
End: 2024-07-08
Payer: MEDICARE

## 2024-07-08 DIAGNOSIS — Z79.4 TYPE 2 DIABETES MELLITUS WITH DIABETIC POLYNEUROPATHY, WITH LONG-TERM CURRENT USE OF INSULIN (MULTI): ICD-10-CM

## 2024-07-08 DIAGNOSIS — E11.42 TYPE 2 DIABETES MELLITUS WITH DIABETIC POLYNEUROPATHY, WITH LONG-TERM CURRENT USE OF INSULIN (MULTI): ICD-10-CM

## 2024-07-08 NOTE — TELEPHONE ENCOUNTER
I spoke to Arianna, she states that the Pt received a letter from his insurance stating Jardiance will no longer be covered. She also states that she will look for the letter, and C/B to let you know what the alternative Rx is.

## 2024-07-11 ENCOUNTER — OFFICE VISIT (OUTPATIENT)
Dept: CARDIOLOGY | Facility: CLINIC | Age: 64
End: 2024-07-11
Payer: MEDICARE

## 2024-07-11 VITALS
BODY MASS INDEX: 45.1 KG/M2 | HEART RATE: 74 BPM | RESPIRATION RATE: 20 BRPM | DIASTOLIC BLOOD PRESSURE: 71 MMHG | HEIGHT: 70 IN | SYSTOLIC BLOOD PRESSURE: 121 MMHG | OXYGEN SATURATION: 92 % | WEIGHT: 315 LBS

## 2024-07-11 DIAGNOSIS — I50.30 HEART FAILURE WITH PRESERVED LEFT VENTRICULAR FUNCTION (HFPEF) (MULTI): ICD-10-CM

## 2024-07-11 DIAGNOSIS — Z86.79 HISTORY OF ATRIAL FIBRILLATION: ICD-10-CM

## 2024-07-11 DIAGNOSIS — I25.10 CORONARY ARTERY CALCIFICATION SEEN ON CT SCAN: ICD-10-CM

## 2024-07-11 DIAGNOSIS — I71.21 ANEURYSM OF ASCENDING AORTA WITHOUT RUPTURE (CMS-HCC): Primary | ICD-10-CM

## 2024-07-11 DIAGNOSIS — I70.0 ATHEROSCLEROSIS OF AORTA (CMS-HCC): ICD-10-CM

## 2024-07-11 PROCEDURE — 3074F SYST BP LT 130 MM HG: CPT | Performed by: INTERNAL MEDICINE

## 2024-07-11 PROCEDURE — 1036F TOBACCO NON-USER: CPT | Performed by: INTERNAL MEDICINE

## 2024-07-11 PROCEDURE — 99214 OFFICE O/P EST MOD 30 MIN: CPT | Performed by: INTERNAL MEDICINE

## 2024-07-11 PROCEDURE — 3078F DIAST BP <80 MM HG: CPT | Performed by: INTERNAL MEDICINE

## 2024-07-11 PROCEDURE — 3008F BODY MASS INDEX DOCD: CPT | Performed by: INTERNAL MEDICINE

## 2024-07-11 PROCEDURE — 1159F MED LIST DOCD IN RCRD: CPT | Performed by: INTERNAL MEDICINE

## 2024-07-11 PROCEDURE — 1123F ACP DISCUSS/DSCN MKR DOCD: CPT | Performed by: INTERNAL MEDICINE

## 2024-07-11 ASSESSMENT — ENCOUNTER SYMPTOMS
ALTERED MENTAL STATUS: 0
VOMITING: 0
CHILLS: 0
HEMOPTYSIS: 0
FALLS: 0
DIARRHEA: 0
DEPRESSION: 0
DYSURIA: 0
COUGH: 0
CONSTIPATION: 0
HEADACHES: 0
MEMORY LOSS: 0
HEMATURIA: 0
FEVER: 0
ABDOMINAL PAIN: 0
MYALGIAS: 0
NAUSEA: 0
WHEEZING: 0
BLOATING: 0

## 2024-07-11 NOTE — PROGRESS NOTES
Chief complaint:  Follow-up     HPI  62 yo WM w/ h/o AFIB x1 (10/21 during COVID), HFpEF, mild CAC, athero of Ao, ?mild AsAo aneurysm, DM, IBS, RICCI (pend CPAP) now here for cardiology f/u. He has long h/o occ  pinpoint CP x days that prev resolved w/ chiropractic manipulation. No other CP. No dyspnea at rest. +ch PERSAUD, improved w/ Lasix. No orthopnea/PND. No palps. +occ brief LH on standing up. No syncope. No cough. +ch LE edema, improved w/ Lasix.   ECG 12/20: ST (109), [facility]  ECG 10/21: SR (92), [facility]  ECG 10/21: AFIB (125)  ECG 10/21: AFIB (70), [facility]  ECG 10/22: SR (74), [facility]  Echo 10/20: EF 55-60%, mild LAE, mild TR, PASP 47  Echo 10/22: TDS, EF 55-60%, DD, ?nl LA, valves ok  CXR 3/23: no further airspace dz, some gen int prom remains (?CILD)  CTA chest 6/21: no PE  CT chest 12/21: mild cor calc, nl heart size, no peric eff, min athero of Ao, no aneurysm  CT chest 12/22: AsAo 4.2cm, athero of Ao + branches, stable CM, no peric eff, mild prom PA, stable faint GGOs  CT chest 2/24: mod CAC, nl Ao/PA size  PFT 3/22: restriction, mod red DLCO  CT ab 10/22: athero of Ao + branches, no AAA, nl IVC  LE US 12/20: no DVT     Review of Systems   Constitutional: Negative for chills, fever and malaise/fatigue.   HENT:  Negative for hearing loss.    Eyes:  Negative for visual disturbance.   Respiratory:  Negative for cough, hemoptysis and wheezing.    Skin:  Negative for rash.   Musculoskeletal:  Negative for falls and myalgias.   Gastrointestinal:  Negative for bloating, abdominal pain, constipation, diarrhea, dysphagia, nausea and vomiting.   Genitourinary:  Negative for dysuria and hematuria.   Neurological:  Negative for headaches.   Psychiatric/Behavioral:  Negative for altered mental status, depression and memory loss.       Social History     Tobacco Use    Smoking status: Never     Passive exposure: Never    Smokeless tobacco: Never   Substance Use Topics    Alcohol use: Yes     Comment: rare      Family History   Problem  "Relation Name Age of Onset    Stroke Mother      Dementia Mother      Pneumonia Mother      Heart disease Father      Diabetes Father      Cancer Father      Arthritis Father      Skin cancer Father      Melanoma Father      Gout Father      Heart attack Father      Other (Cardiac disorder) Father        Allergies   Allergen Reactions    Topiramate Shortness of breath    Terbinafine Unknown    Penicillins Rash     \"red and hot\"      Current Outpatient Medications   Medication Instructions    albuterol 90 mcg/actuation inhaler 2 puffs, Every 4 hours PRN    baclofen (LIORESAL) 10 mg, oral, 3 times daily    blood-glucose sensor (FreeStyle Casandra 3 Sensor) device Apply one sensor to the skin every 14 days to test blood sugars    cephalexin (KEFLEX) 500 mg, oral, 2 times daily    empagliflozin (JARDIANCE) 25 mg, oral, Daily    FreeStyle glucose monitoring (FreeStyle Lite Meter) kit 1 each    FreeStyle Lite Strips strip 4 times daily    furosemide (LASIX) 80 mg, oral, 2 times daily    HumaLOG KwikPen Insulin 100 unit/mL injection Use as directed with meals up to 110 units a day disp 105 ml 90 day supply    insulin glargine (Toujeo Max U-300 SoloStar) 300 unit/mL (3 mL) injection INJECT UP  UNITS UNDER THE SKIN EVERY DAY    metoprolol tartrate (LOPRESSOR) 25 mg, oral, 2 times daily    naloxone (Narcan) 4 mg/0.1 mL nasal spray nasal    nystatin (Mycostatin) cream APPLY AND RUB IN A THIN LAYER TOPICALLY TO THE AFFECTED AREA TWICE DAILY IN THE MORNING AND IN THE EVENING    oxyCODONE (ROXICODONE) 10 mg, oral, 4 times daily PRN    oxyCODONE (ROXICODONE) 10 mg, oral, Every 6 hours PRN    pen needle, diabetic (BD Ultra-Fine Short Pen Needle) 31 gauge x 5/16\" needle Use as directed    pregabalin (LYRICA) 150 mg, oral, 3 times daily    rosuvastatin (CRESTOR) 10 mg, oral, Daily    semaglutide (Ozempic) 1 mg/dose (4 mg/3 mL) pen injector INJECT 1MG UNDER THE SKIN 1 TIME PER WEEK    sodium hyaluronate (Durolane) 60 mg/3 mL " injection INJECT RIGHT KNEE 1 TIME; QTY 1 X 3 ML SYRINGE    Symbicort 160-4.5 mcg/actuation inhaler 2 puffs, inhalation, 2 times daily       Vitals:    07/11/24 0828   BP: 121/71   Pulse: 74   Resp: 20   SpO2: 92%      Physical Exam  Constitutional:       Appearance: Normal appearance.   HENT:      Head: Normocephalic and atraumatic.      Nose: Nose normal.   Neck:      Vascular: No carotid bruit.   Cardiovascular:      Rate and Rhythm: Normal rate and regular rhythm.      Heart sounds: No murmur heard.  Pulmonary:      Effort: Pulmonary effort is normal.      Breath sounds: Normal breath sounds.   Abdominal:      Palpations: Abdomen is soft.      Tenderness: There is no abdominal tenderness.   Musculoskeletal:      Right lower leg: Edema present.      Left lower leg: Edema present.      Comments: Tr LE edema   Skin:     General: Skin is warm and dry.   Neurological:      General: No focal deficit present.      Mental Status: He is alert.   Psychiatric:         Mood and Affect: Mood normal.         Judgment: Judgment normal.       Results/Data  11/22 Cr 1.02, K 4.1, MG 2.28, BNP 24  10/22 Cr 0.8, K 4.1, LFT nl, LDL 77, HDL 39, , Chol 192, HGB 15.5,   10/21   2/21 LDL 94, HDL 41, , Chol 162      Assessment/Plan   62 yo WM w/ h/o AFIB x1 (10/21 during COVID), HFpEF, mild CAC, athero of Ao, ?mild AsAo aneurysm, DM, IBS, RICCI (pend CPAP). Doing fair. Appears near compensated. Wgt coming down on Ozempic.  -continue ASA 81 qd (with food)  -continue Metoprolol 25 bid  -continue Lasix 100 bid; high K diet  -consider statin; LDL 92 on Colestipol (using for IBS) and he already has many pains  -continue Jardiance 25 qd  -f/u 9 months (earlier if needed)     Marcos Thomson MD

## 2024-07-16 DIAGNOSIS — I10 PRIMARY HYPERTENSION: ICD-10-CM

## 2024-07-16 RX ORDER — METOPROLOL TARTRATE 25 MG/1
25 TABLET, FILM COATED ORAL 2 TIMES DAILY
Qty: 180 TABLET | Refills: 3 | Status: SHIPPED | OUTPATIENT
Start: 2024-07-16

## 2024-07-17 ENCOUNTER — OFFICE VISIT (OUTPATIENT)
Dept: PAIN MEDICINE | Facility: CLINIC | Age: 64
End: 2024-07-17
Payer: MEDICARE

## 2024-07-17 VITALS
DIASTOLIC BLOOD PRESSURE: 63 MMHG | RESPIRATION RATE: 18 BRPM | OXYGEN SATURATION: 91 % | HEART RATE: 68 BPM | SYSTOLIC BLOOD PRESSURE: 100 MMHG

## 2024-07-17 DIAGNOSIS — Z79.891 ENCOUNTER FOR LONG-TERM USE OF OPIATE ANALGESIC: ICD-10-CM

## 2024-07-17 DIAGNOSIS — M17.0 PRIMARY OSTEOARTHRITIS OF BOTH KNEES: Primary | ICD-10-CM

## 2024-07-17 PROCEDURE — 99213 OFFICE O/P EST LOW 20 MIN: CPT | Performed by: NURSE PRACTITIONER

## 2024-07-17 PROCEDURE — 1159F MED LIST DOCD IN RCRD: CPT | Performed by: NURSE PRACTITIONER

## 2024-07-17 PROCEDURE — 3074F SYST BP LT 130 MM HG: CPT | Performed by: NURSE PRACTITIONER

## 2024-07-17 PROCEDURE — 1036F TOBACCO NON-USER: CPT | Performed by: NURSE PRACTITIONER

## 2024-07-17 PROCEDURE — 3078F DIAST BP <80 MM HG: CPT | Performed by: NURSE PRACTITIONER

## 2024-07-17 PROCEDURE — 1125F AMNT PAIN NOTED PAIN PRSNT: CPT | Performed by: NURSE PRACTITIONER

## 2024-07-17 PROCEDURE — 1123F ACP DISCUSS/DSCN MKR DOCD: CPT | Performed by: NURSE PRACTITIONER

## 2024-07-17 PROCEDURE — 1160F RVW MEDS BY RX/DR IN RCRD: CPT | Performed by: NURSE PRACTITIONER

## 2024-07-17 RX ORDER — NALOXONE HYDROCHLORIDE 4 MG/.1ML
1 SPRAY NASAL AS NEEDED
Qty: 2 EACH | Refills: 0 | Status: SHIPPED | OUTPATIENT
Start: 2024-07-17

## 2024-07-17 RX ORDER — BACLOFEN 10 MG/1
10 TABLET ORAL 3 TIMES DAILY
Qty: 90 TABLET | Refills: 2 | Status: SHIPPED | OUTPATIENT
Start: 2024-07-17 | End: 2025-07-12

## 2024-07-17 RX ORDER — OXYCODONE HYDROCHLORIDE 10 MG/1
10 TABLET ORAL EVERY 6 HOURS PRN
Qty: 112 TABLET | Refills: 0 | Status: SHIPPED | OUTPATIENT
Start: 2024-08-16 | End: 2024-09-13

## 2024-07-17 RX ORDER — OXYCODONE HYDROCHLORIDE 10 MG/1
10 TABLET ORAL EVERY 6 HOURS PRN
Qty: 112 TABLET | Refills: 0 | Status: SHIPPED | OUTPATIENT
Start: 2024-09-13 | End: 2024-10-11

## 2024-07-17 RX ORDER — OXYCODONE HYDROCHLORIDE 10 MG/1
10 TABLET ORAL EVERY 6 HOURS PRN
Qty: 112 TABLET | Refills: 0 | Status: SHIPPED | OUTPATIENT
Start: 2024-07-19 | End: 2024-08-16

## 2024-07-17 RX ORDER — PREGABALIN 150 MG/1
150 CAPSULE ORAL 3 TIMES DAILY
Qty: 90 CAPSULE | Refills: 2 | Status: SHIPPED | OUTPATIENT
Start: 2024-07-19 | End: 2024-10-17

## 2024-07-17 ASSESSMENT — ENCOUNTER SYMPTOMS
WEAKNESS: 1
BACK PAIN: 1
CONSTITUTIONAL NEGATIVE: 1
CARDIOVASCULAR NEGATIVE: 1
GASTROINTESTINAL NEGATIVE: 1
LIGHT-HEADEDNESS: 0
HIP PAIN: 1
HEMATOLOGIC/LYMPHATIC NEGATIVE: 1
ALLERGIC/IMMUNOLOGIC NEGATIVE: 1
DIZZINESS: 0
NUMBNESS: 0
MYALGIAS: 1
HEADACHES: 0
TREMORS: 0
NECK PAIN: 0
NECK STIFFNESS: 0
EYES NEGATIVE: 1
FACIAL ASYMMETRY: 0
RESPIRATORY NEGATIVE: 1
JOINT SWELLING: 1
ENDOCRINE NEGATIVE: 1
PSYCHIATRIC NEGATIVE: 1
SEIZURES: 0
ARTHRALGIAS: 1
SPEECH DIFFICULTY: 0

## 2024-07-17 ASSESSMENT — PAIN SCALES - GENERAL: PAINLEVEL: 7

## 2024-07-17 NOTE — PROGRESS NOTES
Subjective   Patient ID: Jayy Coles is a 63 y.o. male who presents for chronic pain management.    Hip Pain   Pertinent negatives include no numbness.   Knee Pain   Pertinent negatives include no numbness.   Back Pain  Associated symptoms include weakness. Pertinent negatives include no headaches or numbness.   Med Refill  Associated symptoms include arthralgias, joint swelling, myalgias and weakness. Pertinent negatives include no headaches, neck pain or numbness.       Jayy follows up for interval reevaluation of his chronic left knee pain and is status post surgeries x7 to the knee. He is with chronic knee pain of aching and stiffness but also with burning sharp pain to the knee. Does have weakness. No falls since last office visit.    History of right intra-articular injection from orthopedic surgeon 2/28/2024 help to reduce pain and improve function up to 50%.      Oxycodone 10 mg 4 times a day along with Lyrica 150 mg 3 times a day, baclofen 10 mg 3 times daily helps to reduce pain and improve function to the left knee up to 100% for several hours. Average pain score is 6-7 out of 10 with medication.  At rest pain is a 1 out of a 10.  Denies negative side effects from medication.     Has an average quality of family and social life with current condition and treatment. Has not been to the ED for pain since last office visit.     Toxicology consistent April 17, 2024.  Annual controlled substance agreement and opioid risk tool are completed and scanned into the chart January 18, 2024.    For continuity:   Given the patient's report of reduced pain and improved functional ability without adverse effects, it is reasonable to treat with narcotic medications. The terms of the opioid agreement as well as the potential risks and adverse effects of the patient's medication regimen were discussed in detail. This includes if applicable due to dosage of medication permission to discuss and coordinate care with other  treatment providers relevant to the patients condition. The patient verbalized understanding.     Risks and side effects of chronic opioid therapy including but not limited to tolerance, dependence, constipation, hyperalgesia, cognitive side effects, addiction and possible death due to overuse and or misuse were discussed. I also discussed that such medications when co-administered with other sedative agents including but not limited to alcohol, benzodiazepines, sedative hypnotics and illegal drugs could pose life threatening consequences including death. I also explained the impact that the administration of such medication has on a patient with obstructive sleep apnea and continued recommendations for use of apnea devices if ordered are prescribed by other physicians. In order to effectively and safely treat the pain, I also emphasized the importance of compliance with the treatment plan, as well as compliance with the terms of the opioid agreement, which was reviewed in detail. I explained the importance of being responsible with the medications and to take these only as prescribed, never in excess and never for reasons other than pain reduction. The patient was counseled on keeping the medications safe and locked away from children and other adults as well as disposal methods and options. The patient understood the risks and instructions.     I also discussed with the patient in detail that based on the clinical response to the opioid medications and improvements of activities of daily living, sleep, and work performance in light of compliance with the treatment plan we can continue this form of therapy for the above chronic pain. The goal and rationale used for current treatment with chronic opioid medication is to control the pain and alleviate disability induced by the chronic pain condition noted above after failures of other non-opioid and nonpharmacological modalities to treat the chronic pain and the  symptoms associated with have failed. The patient understood the goals in terms of the above treatment plan and had no further questions prior to leaving the office today.     Of note, the above-mentioned diagnoses/conditions and expected fluctuating nature of pain, and pain characteristic changes may lead to prolonged functional impairment requiring frequent and multiple reassessments with continued high level medical decision making. As noted, medication and medication management may require opiate therapy in excess of a routine less than 30 day medication requirement. The patient may require daily opiate therapy necessitating month-long prescription medication as noted above in order to perform activities of daily living and achieve acceptable quality of life with respect to their chronic pain condition for the foreseeable future. We monitor our patient's carefully through drug monitoring, medication counts, urine drug testing specific to their medication as well as a myriad of other substances and with frequent follow-ups with interval reassement of the chronic pain condition, its pathophysiology and prognosis.     The level of clinical decision making at this office visit is high due to high risks and complications including mortality and morbidity related to acute and chronic pain with respects to life, bodily function, and treatment. Risks and clinical decisions with respect to under treatment, failure to maintain adequate treatment, and/or overtreatment complications and outcomes were discussed with the patient with respect to their chronic pain conditions, interventional therapies, as well as the use of various medications including possible controlled/dangerous medications. The amount and complexity of reviewed data at this in subsequent office visits is high given patient's fluctuating clinical presentation, laboratory and radiographic reports, prescription monitoring program data, and medication history  as well as other relevant data as noted above. Pertinent negatives and positives data was used in consideration for the above-mentioned high complexity.      Given the patient's total MED, general use of daily opiates, or other coadministered medications in various classes the patient was offered a prescription for Narcan. I instructed the patient that it is important that patient fill this medication in order to demonstrate understanding of the gravity of possible side effects including respiratory depression and risk of overdose of this opiate load or medication combination. As such patient will be required to bring Narcan prescription to follow-up appointments as part of compliance with continued opiate care.     With respect to opiate induced constipation I discussed multiple ways to combat this problem including staying hydrated and taking over-the-counter medications such as Dulcolax, Miralax and Senna. If these treatments are not effective we could consider such medications as Amitiza, Linzess and Movantik.     Disclaimer: This note was transcribed using an audio transcription device. As such, minor errors may be present with regard to spelling, punctuation, and inadvertent word insertion. Please disregard such errors.    Results/Data  Xray Knee Complete 4 or more View [id number] 10:53AM Carl Mosquera      Test Name Result Flag Reference   Xray Knee Complete 4 or more View (Report)       FINAL REPORT  Interpreted by: DAPHNE PATHAK CHRISTOPHER, MD   05/12/23 08:11  MRN: [id number]  Patient Name: LAWRENCE VILLALOBOS     STUDY:  KNEE; COMPLT, 4 OR MORE VIEWS     INDICATION:  AP WB bilat in one shot , PA 30 degree flex WB bilat in one shot (no  orthoball), LAT merchant view 30 degree flex bilat in one shot (no  orthoball) M25.562: Chronic pain of left knee G89.29:.     COMPARISON:  May 5, 2022     ACCESSION NUMBER(S):  [id number]     ORDERING CLINICIAN:  CARL MOSQUERA     FINDINGS:  Long-stem revision left total knee  arthroplasty. Appearance is  unchanged from prior examination. Prior medial femoral condylar  fracture unchanged. No new lucencies or malalignment.     IMPRESSION:  Unchanged appearance of the long-stem revision left total knee  arthroplasty without evidence of new complication.     Electronically signed by: DAPHNE PATHAK  05/12/23 08:11       Review of Systems   Constitutional: Negative.    HENT: Negative.     Eyes: Negative.    Respiratory: Negative.          Awaiting order for portable oxygen machine from insurance.  Is with shortness of breath during exertion such as walking and getting up around about.   Cardiovascular: Negative.    Gastrointestinal: Negative.    Endocrine: Negative.    Genitourinary: Negative.    Musculoskeletal:  Positive for arthralgias, back pain, gait problem, joint swelling and myalgias. Negative for neck pain and neck stiffness.   Skin: Negative.    Allergic/Immunologic: Negative.    Neurological:  Positive for weakness. Negative for dizziness, tremors, seizures, syncope, facial asymmetry, speech difficulty, light-headedness, numbness and headaches.   Hematological: Negative.    Psychiatric/Behavioral: Negative.         Objective   Physical Exam  Vitals and nursing note reviewed.   Constitutional:       Appearance: Normal appearance.   HENT:      Head: Normocephalic and atraumatic.   Eyes:      Conjunctiva/sclera: Conjunctivae normal.   Cardiovascular:      Pulses: Normal pulses.   Pulmonary:      Effort: Pulmonary effort is normal. No respiratory distress.      Comments: Wearing portable oxygen 3L nasal cannula.  Musculoskeletal:      Right lower leg: Edema present.      Left lower leg: Edema present.      Comments: Trace to +1 pitting edema to ankles and pretibial areas.    Right knee with pain during extension and flexion.  Joint line tenderness to palpation.    Right knee with generalized mild swelling.    Left knee with pain during extension and flexion.  Joint line tenderness to  palpation.    Pain with palpation over the well-healed left knee linear scar.    Generalized mild swelling.    Ambulates with mildly antalgic gait.   Skin:     General: Skin is warm and dry.      Capillary Refill: Capillary refill takes 2 to 3 seconds.   Neurological:      Mental Status: He is alert and oriented to person, place, and time.      Cranial Nerves: Cranial nerve deficit present.      Sensory: Sensory deficit present.      Motor: Weakness present.      Gait: Gait abnormal.   Psychiatric:         Mood and Affect: Mood normal.         Behavior: Behavior normal.     Jayy was seen today for med refill and follow-up.  Diagnoses and all orders for this visit:  Primary osteoarthritis of both knees (Primary)  -     baclofen (Lioresal) 10 mg tablet; Take 1 tablet (10 mg) by mouth 3 times a day.  -     pregabalin (Lyrica) 150 mg capsule; Take 1 capsule (150 mg) by mouth 3 times a day. Do not fill before July 19, 2024.  -     oxyCODONE (Roxicodone) 10 mg immediate release tablet; Take 1 tablet (10 mg) by mouth every 6 hours if needed for severe pain (7 - 10) for up to 28 days. Do not fill before July 19, 2024.  -     oxyCODONE (Roxicodone) 10 mg immediate release tablet; Take 1 tablet (10 mg) by mouth every 6 hours if needed for severe pain (7 - 10) for up to 28 days. Do not fill before August 16, 2024.  -     oxyCODONE (Roxicodone) 10 mg immediate release tablet; Take 1 tablet (10 mg) by mouth every 6 hours if needed for severe pain (7 - 10) for up to 28 days. Do not fill before September 13, 2024.  Encounter for long-term use of opiate analgesic  -     naloxone (Narcan) 4 mg/0.1 mL nasal spray; Administer 1 spray (4 mg) into affected nostril(s) if needed for opioid reversal or respiratory depression.       Follow-up 12 weeks.      Reviewed and approved by MALLIKA NEIL on 7/17/24 at 10:37 AM.

## 2024-07-23 ENCOUNTER — APPOINTMENT (OUTPATIENT)
Dept: ENDOCRINOLOGY | Facility: CLINIC | Age: 64
End: 2024-07-23
Payer: MEDICARE

## 2024-07-23 VITALS
HEART RATE: 75 BPM | HEIGHT: 69 IN | DIASTOLIC BLOOD PRESSURE: 70 MMHG | WEIGHT: 315 LBS | SYSTOLIC BLOOD PRESSURE: 137 MMHG | BODY MASS INDEX: 46.65 KG/M2

## 2024-07-23 DIAGNOSIS — E11.65 TYPE 2 DIABETES MELLITUS WITH HYPERGLYCEMIA, WITH LONG-TERM CURRENT USE OF INSULIN (MULTI): Primary | ICD-10-CM

## 2024-07-23 DIAGNOSIS — I10 PRIMARY HYPERTENSION: ICD-10-CM

## 2024-07-23 DIAGNOSIS — E78.2 MIXED HYPERLIPIDEMIA: ICD-10-CM

## 2024-07-23 DIAGNOSIS — Z79.4 TYPE 2 DIABETES MELLITUS WITH HYPERGLYCEMIA, WITH LONG-TERM CURRENT USE OF INSULIN (MULTI): Primary | ICD-10-CM

## 2024-07-23 LAB — POC HEMOGLOBIN A1C: 9.8 % (ref 4.2–6.5)

## 2024-07-23 PROCEDURE — 3008F BODY MASS INDEX DOCD: CPT | Performed by: NURSE PRACTITIONER

## 2024-07-23 PROCEDURE — 83036 HEMOGLOBIN GLYCOSYLATED A1C: CPT | Performed by: NURSE PRACTITIONER

## 2024-07-23 PROCEDURE — 99213 OFFICE O/P EST LOW 20 MIN: CPT | Performed by: NURSE PRACTITIONER

## 2024-07-23 PROCEDURE — 3075F SYST BP GE 130 - 139MM HG: CPT | Performed by: NURSE PRACTITIONER

## 2024-07-23 PROCEDURE — 1126F AMNT PAIN NOTED NONE PRSNT: CPT | Performed by: NURSE PRACTITIONER

## 2024-07-23 PROCEDURE — 1036F TOBACCO NON-USER: CPT | Performed by: NURSE PRACTITIONER

## 2024-07-23 PROCEDURE — 1123F ACP DISCUSS/DSCN MKR DOCD: CPT | Performed by: NURSE PRACTITIONER

## 2024-07-23 PROCEDURE — 1160F RVW MEDS BY RX/DR IN RCRD: CPT | Performed by: NURSE PRACTITIONER

## 2024-07-23 PROCEDURE — 3078F DIAST BP <80 MM HG: CPT | Performed by: NURSE PRACTITIONER

## 2024-07-23 PROCEDURE — 1159F MED LIST DOCD IN RCRD: CPT | Performed by: NURSE PRACTITIONER

## 2024-07-23 ASSESSMENT — PAIN SCALES - GENERAL: PAINLEVEL: 0-NO PAIN

## 2024-07-23 ASSESSMENT — PATIENT HEALTH QUESTIONNAIRE - PHQ9
1. LITTLE INTEREST OR PLEASURE IN DOING THINGS: NOT AT ALL
SUM OF ALL RESPONSES TO PHQ9 QUESTIONS 1 & 2: 0
2. FEELING DOWN, DEPRESSED OR HOPELESS: NOT AT ALL

## 2024-07-23 ASSESSMENT — ENCOUNTER SYMPTOMS
OCCASIONAL FEELINGS OF UNSTEADINESS: 0
LOSS OF SENSATION IN FEET: 0
DEPRESSION: 0

## 2024-07-23 ASSESSMENT — LIFESTYLE VARIABLES
HOW OFTEN DO YOU HAVE A DRINK CONTAINING ALCOHOL: NEVER
AUDIT-C TOTAL SCORE: 0
HOW OFTEN DO YOU HAVE SIX OR MORE DRINKS ON ONE OCCASION: NEVER
HOW MANY STANDARD DRINKS CONTAINING ALCOHOL DO YOU HAVE ON A TYPICAL DAY: PATIENT DOES NOT DRINK
SKIP TO QUESTIONS 9-10: 1

## 2024-07-23 NOTE — PROGRESS NOTES
HPI   Presents for follow up/metabolic management 62 yo with DM Type 2 diagnosed age 43. History HTN, HLD, IBS, Afib with Covid 2021, HFpEF (Dr. Alix CHUNG) OA Knees, O2 night time use. Current A1C 9.6% was 10.0%. Patient testing sugars > 4 times day. Following carb controlled diet somewhat and know reasonable carb allowances. Patient able to afford their medications now. Patient is not exercising.  Previous patient of Dr. Booker, was not in the office for over 1 year ([id number]) due to several illnesses, post covid and knee surgery complications/infections.    -CGM Casandra 3 with phone kapil. Currently on insulin checking BS at least 4 xs a day adjustments made based on readings/frequent visits to manage.  -INSULIN Toujeo 122 units visit Humalog ISF 20 >120         -Metformin intolerant, SGLT2 Jardiance 25 mg TZD (no due to CHF) GLP1 Ozempic 1 mg   -HTN Metoprolol, Furosemide 80 mg daily at goal  -STATIN Rosuvastatin 10 mg LDL 92 (has been taking Colestipol for IBS)        (After patient visit done) did not have phone during visit, 90 day Casandra report downloaded and attached 22%, 0% lows, avg bs 232. Has been waking up with BS above 200, then the rest of the day he is above target but has improved.       Current Outpatient Medications:     baclofen (Lioresal) 10 mg tablet, Take 1 tablet (10 mg) by mouth 3 times a day., Disp: 90 tablet, Rfl: 2    blood-glucose sensor (FreeStyle Casandra 3 Sensor) device, Apply one sensor to the skin every 14 days to test blood sugars, Disp: 2 each, Rfl: 11    cephalexin (Keflex) 500 mg capsule, Take 1 capsule (500 mg) by mouth 2 times a day., Disp: 90 capsule, Rfl: 3    empagliflozin (Jardiance) 25 mg, Take 1 tablet (25 mg) by mouth once daily., Disp: 90 tablet, Rfl: 3    FreeStyle glucose monitoring (FreeStyle Lite Meter) kit, 1 each., Disp: , Rfl:     FreeStyle Lite Strips strip, 4 times a day., Disp: , Rfl:     furosemide (Lasix) 80 mg tablet, Take 1 tablet (80 mg) by mouth 2 times a day.  "(Patient taking differently: Take 100 mg by mouth 2 times a day.), Disp: 180 tablet, Rfl: 3    HumaLOG KwikPen Insulin 100 unit/mL injection, Use as directed with meals up to 110 units a day disp 105 ml 90 day supply, Disp: 105 mL, Rfl: 3    insulin glargine (Toujeo Max U-300 SoloStar) 300 unit/mL (3 mL) injection, INJECT UP  UNITS UNDER THE SKIN EVERY DAY, Disp: 36 mL, Rfl: 3    metoprolol tartrate (Lopressor) 25 mg tablet, Take 1 tablet (25 mg) by mouth 2 times a day., Disp: 180 tablet, Rfl: 3    naloxone (Narcan) 4 mg/0.1 mL nasal spray, Administer 1 spray (4 mg) into affected nostril(s) if needed for opioid reversal or respiratory depression., Disp: 2 each, Rfl: 0    nystatin (Mycostatin) cream, APPLY AND RUB IN A THIN LAYER TOPICALLY TO THE AFFECTED AREA TWICE DAILY IN THE MORNING AND IN THE EVENING, Disp: , Rfl:     oxyCODONE (Roxicodone) 10 mg immediate release tablet, Take 1 tablet (10 mg) by mouth every 6 hours if needed for severe pain (7 - 10) for up to 28 days. Do not fill before July 19, 2024., Disp: 112 tablet, Rfl: 0    [START ON 8/16/2024] oxyCODONE (Roxicodone) 10 mg immediate release tablet, Take 1 tablet (10 mg) by mouth every 6 hours if needed for severe pain (7 - 10) for up to 28 days. Do not fill before August 16, 2024., Disp: 112 tablet, Rfl: 0    [START ON 9/13/2024] oxyCODONE (Roxicodone) 10 mg immediate release tablet, Take 1 tablet (10 mg) by mouth every 6 hours if needed for severe pain (7 - 10) for up to 28 days. Do not fill before September 13, 2024., Disp: 112 tablet, Rfl: 0    pen needle, diabetic (BD Ultra-Fine Short Pen Needle) 31 gauge x 5/16\" needle, Use as directed, Disp: , Rfl:     pregabalin (Lyrica) 150 mg capsule, Take 1 capsule (150 mg) by mouth 3 times a day. Do not fill before July 19, 2024., Disp: 90 capsule, Rfl: 2    rosuvastatin (Crestor) 10 mg tablet, Take 1 tablet (10 mg) by mouth once daily., Disp: 90 tablet, Rfl: 3    semaglutide (Ozempic) 1 mg/dose (4 mg/3 " "mL) pen injector, INJECT 1MG UNDER THE SKIN 1 TIME PER WEEK, Disp: 9 mL, Rfl: 1    sodium hyaluronate (Durolane) 60 mg/3 mL injection, INJECT RIGHT KNEE 1 TIME; QTY 1 X 3 ML SYRINGE, Disp: 3 mL, Rfl: 0    albuterol 90 mcg/actuation inhaler, 2 puffs every 4 hours if needed for shortness of breath or wheezing (cough)., Disp: , Rfl:     oxyCODONE (Roxicodone) 10 mg immediate release tablet, Take 1 tablet (10 mg) by mouth 4 times a day as needed for moderate pain (4 - 6) for up to 28 days. Do not start before March 22, 2024., Disp: 112 tablet, Rfl: 0    Symbicort 160-4.5 mcg/actuation inhaler, Inhale 2 puffs 2 times a day. (Patient taking differently: Inhale 2 puffs if needed.), Disp: 1 each, Rfl: 11      Allergies as of 07/23/2024 - Reviewed 07/23/2024   Allergen Reaction Noted    Topiramate Shortness of breath 05/21/2024    Terbinafine Unknown 03/21/2023    Penicillins Rash 03/21/2023         Review of Systems   Cardiology: Lightheadedness-denies.  Chest pain-denies.  Leg edema-denies.  Palpitations-denies.  Respiratory: Cough-denies. Shortness of breath-denies.  Wheezing-denies.  Gastroenterology: Constipation-denies.  Diarrhea-denies.  Heartburn-denies.  Endocrinology: Cold intolerance-denies.  Heat intolerance-denies.  Sweats-denies.  Neurology: Headache-denies.  Tremor-denies.  Neuropathy in extremities-denies.  Psychology: Low energy-denies.  Irritability-denies.  Sleep disturbances-denies.      /70   Pulse 75   Ht 1.753 m (5' 9\")   Wt 146 kg (321 lb)   BMI 47.40 kg/m²       Labs:  Lab Results   Component Value Date    WBC 11.4 (H) 08/15/2023    NRBC CANCELED 08/15/2023    RBC 5.79 08/15/2023    HGB 17.3 08/15/2023    HCT 53.6 (H) 08/15/2023     08/15/2023     Lab Results   Component Value Date    CALCIUM 9.1 08/15/2023    AST 20 08/15/2023    ALKPHOS 93 08/15/2023    BILITOT 0.5 08/15/2023    PROT 7.7 08/15/2023    ALBUMIN 4.1 08/15/2023     08/15/2023    K 3.9 08/15/2023    CL 99 " 08/15/2023    CO2 28 08/15/2023    ANIONGAP 14 08/15/2023    BUN 24 (H) 08/15/2023    CREATININE 1.01 08/15/2023    GLUCOSE 194 (H) 08/15/2023    ALT 25 08/15/2023     Lab Results   Component Value Date    CHOL 205 (H) 08/15/2023    TRIG 344 (H) 08/15/2023    HDL 44.2 08/15/2023     Lab Results   Component Value Date    TSH 4.03 (H) 08/15/2023         Physical Exam   General Appearance: pleasant, cooperative, no acute distress  HEENT: no chemosis, no proptosis, no lid lag, no lid retraction  Neck: no lymphadenopathy, no thyromegaly, no dominant thyroid nodules  Heart: no murmurs, regular rate and rhythm, S1 and S2  Lungs: no wheezes, no rhonci, no rales  Extremities: 1-2+ edema BLE L>R      Assessment/Plan     1. Type 2 diabetes mellitus with hyperglycemia, with long-term current use of insulin (Multi)  -A1c improved but not at target  -unable to connect to [facility] data during visit he left his phone at home  -missed 2 doses Ozempic but resuming tomorrow  -reviewed target BS  -not always using meal time dosing, forgets, or takes it late  -meeting with educator next visit he agrees this time  -suspect his mealtime dosing is not consistent  -plan to increase GLP1 to max dose    - POCT glycosylated hemoglobin (Hb A1C) manually resulted    2. Mixed hyperlipidemia  -tolerates statin  -still has not had labs done    3. Primary hypertension  -at target    Follow Up: 6 weeks Britta or Alanna    -labs/tests/notes reviewed  -reviewed and counseled patient on medication monitoring and side effects  Medical Decision Making  Complexity of problem: Chronic illness of diabetes mellitus uncontrolled, progressing  Data analyzed and reviewed: Reviewed prior notes, blood glucose data, labs including HgbA1c, lipids, serum chemistries.  Ordered tests.   Risk of complications and morbidities: Is definite because of use of insulin and risk of hypoglycemia.  Prescription medications reviewed and modifications made.  Compliance  assessed.  Addressed social determinants of health including food insecurity.

## 2024-08-23 DIAGNOSIS — E11.42 TYPE 2 DIABETES MELLITUS WITH DIABETIC POLYNEUROPATHY, WITH LONG-TERM CURRENT USE OF INSULIN (MULTI): ICD-10-CM

## 2024-08-23 DIAGNOSIS — Z79.4 TYPE 2 DIABETES MELLITUS WITH DIABETIC POLYNEUROPATHY, WITH LONG-TERM CURRENT USE OF INSULIN (MULTI): ICD-10-CM

## 2024-08-23 RX ORDER — SEMAGLUTIDE 1.34 MG/ML
INJECTION, SOLUTION SUBCUTANEOUS
Qty: 9 ML | Refills: 1 | Status: SHIPPED | OUTPATIENT
Start: 2024-08-23

## 2024-08-27 ENCOUNTER — APPOINTMENT (OUTPATIENT)
Dept: ENDOCRINOLOGY | Facility: CLINIC | Age: 64
End: 2024-08-27
Payer: MEDICARE

## 2024-08-27 VITALS
BODY MASS INDEX: 46.65 KG/M2 | HEIGHT: 69 IN | SYSTOLIC BLOOD PRESSURE: 112 MMHG | WEIGHT: 315 LBS | DIASTOLIC BLOOD PRESSURE: 70 MMHG

## 2024-08-27 DIAGNOSIS — E78.2 MIXED HYPERLIPIDEMIA: ICD-10-CM

## 2024-08-27 DIAGNOSIS — Z79.4 TYPE 2 DIABETES MELLITUS WITH HYPERGLYCEMIA, WITH LONG-TERM CURRENT USE OF INSULIN (MULTI): Primary | ICD-10-CM

## 2024-08-27 DIAGNOSIS — I10 PRIMARY HYPERTENSION: ICD-10-CM

## 2024-08-27 DIAGNOSIS — E11.65 TYPE 2 DIABETES MELLITUS WITH HYPERGLYCEMIA, WITH LONG-TERM CURRENT USE OF INSULIN (MULTI): Primary | ICD-10-CM

## 2024-08-27 LAB — POC HEMOGLOBIN A1C: 9.1 % (ref 4.2–6.5)

## 2024-08-27 PROCEDURE — 95251 CONT GLUC MNTR ANALYSIS I&R: CPT | Performed by: INTERNAL MEDICINE

## 2024-08-27 PROCEDURE — 99214 OFFICE O/P EST MOD 30 MIN: CPT | Performed by: INTERNAL MEDICINE

## 2024-08-27 PROCEDURE — 83036 HEMOGLOBIN GLYCOSYLATED A1C: CPT | Performed by: INTERNAL MEDICINE

## 2024-08-27 RX ORDER — SEMAGLUTIDE 2.68 MG/ML
2 INJECTION, SOLUTION SUBCUTANEOUS
Qty: 9 ML | Refills: 1 | Status: SHIPPED | OUTPATIENT
Start: 2024-08-27

## 2024-08-27 NOTE — PROGRESS NOTES
Subjective   Patient ID: Jayy Coles is a 63 y.o. male who presents for Diabetes.  HPI  64 yo previous pt of Dr. Booker with DM Type 2 diagnosed age 43, HTN, HLD, IBS, Afib with Covid 2021, HFpEF (Dr. Alix CHUNG) OA Knees, O2 night time use presents for follow up.     Last A1c 9.6% - today 9.1%.   Patient testing sugars > 4 times day with Casandra 3. Following carb controlled diet somewhat and know reasonable carb allowances. Patient able to afford their medications now. Patient is not exercising.    Taking Toujeo 124 units visit, Humalog 31 units with meals plus ISF 20 >120, jardiance 25mg daily, and Ozempic 1mg weekly (tolerating well).   -Metformin intolerant  -No TZD due to CHF    Taking metoprolol tartrate 25mg BID and furosemide 100mg BID for htn/heart.   Taking rosuvastatin 10 mg daily for lipids.   -LDL 92 (has been taking Colestipol for IBS)        30 day Casandra report downloaded and attached: 25% in target, 0% lows, avg bs 213. Has been waking up with BS above 200, then the rest of the day he is above target but has improved, going high in the morning and after dinner especially. Occ lows in the afternoon.     Current Outpatient Medications:     albuterol 90 mcg/actuation inhaler, 2 puffs every 4 hours if needed for shortness of breath or wheezing (cough)., Disp: , Rfl:     baclofen (Lioresal) 10 mg tablet, Take 1 tablet (10 mg) by mouth 3 times a day., Disp: 90 tablet, Rfl: 2    blood-glucose sensor (FreeStyle Casandra 3 Sensor) device, Apply one sensor to the skin every 14 days to test blood sugars, Disp: 2 each, Rfl: 11    cephalexin (Keflex) 500 mg capsule, Take 1 capsule (500 mg) by mouth 2 times a day., Disp: 90 capsule, Rfl: 3    empagliflozin (Jardiance) 25 mg, Take 1 tablet (25 mg) by mouth once daily., Disp: 90 tablet, Rfl: 3    FreeStyle glucose monitoring (FreeStyle Lite Meter) kit, 1 each., Disp: , Rfl:     FreeStyle Lite Strips strip, 4 times a day., Disp: , Rfl:     furosemide (Lasix) 80 mg tablet,  Take 1 tablet (80 mg) by mouth 2 times a day. (Patient taking differently: Take 100 mg by mouth 2 times a day.), Disp: 180 tablet, Rfl: 3    HumaLOG KwikPen Insulin 100 unit/mL injection, Use as directed with meals up to 110 units a day disp 105 ml 90 day supply, Disp: 105 mL, Rfl: 3    insulin glargine (Toujeo Max U-300 SoloStar) 300 unit/mL (3 mL) injection, INJECT UP  UNITS UNDER THE SKIN EVERY DAY, Disp: 36 mL, Rfl: 3    metoprolol tartrate (Lopressor) 25 mg tablet, Take 1 tablet (25 mg) by mouth 2 times a day., Disp: 180 tablet, Rfl: 3    naloxone (Narcan) 4 mg/0.1 mL nasal spray, Administer 1 spray (4 mg) into affected nostril(s) if needed for opioid reversal or respiratory depression., Disp: 2 each, Rfl: 0    nystatin (Mycostatin) cream, APPLY AND RUB IN A THIN LAYER TOPICALLY TO THE AFFECTED AREA TWICE DAILY IN THE MORNING AND IN THE EVENING, Disp: , Rfl:     oxyCODONE (Roxicodone) 10 mg immediate release tablet, Take 1 tablet (10 mg) by mouth 4 times a day as needed for moderate pain (4 - 6) for up to 28 days. Do not start before March 22, 2024., Disp: 112 tablet, Rfl: 0    oxyCODONE (Roxicodone) 10 mg immediate release tablet, Take 1 tablet (10 mg) by mouth every 6 hours if needed for severe pain (7 - 10) for up to 28 days. Do not fill before July 19, 2024., Disp: 112 tablet, Rfl: 0    oxyCODONE (Roxicodone) 10 mg immediate release tablet, Take 1 tablet (10 mg) by mouth every 6 hours if needed for severe pain (7 - 10) for up to 28 days. Do not fill before August 16, 2024., Disp: 112 tablet, Rfl: 0    [START ON 9/13/2024] oxyCODONE (Roxicodone) 10 mg immediate release tablet, Take 1 tablet (10 mg) by mouth every 6 hours if needed for severe pain (7 - 10) for up to 28 days. Do not fill before September 13, 2024., Disp: 112 tablet, Rfl: 0    Ozempic 2 mg/dose (8 mg/3 mL) pen injector, Inject 2 mg under the skin every 7 days., Disp: 9 mL, Rfl: 1    pen needle, diabetic (BD Ultra-Fine Short Pen Needle) 31  "gauge x 5/16\" needle, Use as directed, Disp: , Rfl:     pregabalin (Lyrica) 150 mg capsule, Take 1 capsule (150 mg) by mouth 3 times a day. Do not fill before July 19, 2024., Disp: 90 capsule, Rfl: 2    rosuvastatin (Crestor) 10 mg tablet, Take 1 tablet (10 mg) by mouth once daily., Disp: 90 tablet, Rfl: 3    sodium hyaluronate (Durolane) 60 mg/3 mL injection, INJECT RIGHT KNEE 1 TIME; QTY 1 X 3 ML SYRINGE, Disp: 3 mL, Rfl: 0    Symbicort 160-4.5 mcg/actuation inhaler, Inhale 2 puffs 2 times a day. (Patient taking differently: Inhale 2 puffs if needed.), Disp: 1 each, Rfl: 11   Allergies   Allergen Reactions    Topiramate Shortness of breath    Terbinafine Unknown    Penicillins Rash     \"red and hot\"       Review of Systems  See HPI.     Objective   /70   Ht 1.753 m (5' 9\")   Wt 145 kg (319 lb)   BMI 47.11 kg/m²     Labs:   Lab Results   Component Value Date    HGBA1C 9.1 (A) 08/27/2024     Lab Results   Component Value Date    CALCIUM 9.1 08/15/2023    AST 20 08/15/2023    ALKPHOS 93 08/15/2023    BILITOT 0.5 08/15/2023    PROT 7.7 08/15/2023    ALBUMIN 4.1 08/15/2023     08/15/2023    K 3.9 08/15/2023    CL 99 08/15/2023    CO2 28 08/15/2023    ANIONGAP 14 08/15/2023    BUN 24 (H) 08/15/2023    CREATININE 1.01 08/15/2023    GLUCOSE 194 (H) 08/15/2023    ALT 25 08/15/2023     Lab Results   Component Value Date    WBC 11.4 (H) 08/15/2023    NRBC CANCELED 08/15/2023    RBC 5.79 08/15/2023    HGB 17.3 08/15/2023    HCT 53.6 (H) 08/15/2023     08/15/2023     Lab Results   Component Value Date    CHOL 205 (H) 08/15/2023    TRIG 344 (H) 08/15/2023    HDL 44.2 08/15/2023     Lab Results   Component Value Date    CREATU 56.4 08/15/2023     Lab Results   Component Value Date    TSH 4.03 (H) 08/15/2023     Lab Results   Component Value Date    MG 2.31 08/15/2023       Assessment    1. Type 2 diabetes mellitus with hyperglycemia, with long-term current use of insulin (Multi)    2. Mixed hyperlipidemia  "   3. Primary hypertension        Medical Decision Making  Complexity of problem: Chronic illness of diabetes mellitus uncontrolled, progressing  Data analyzed and reviewed: Reviewed prior notes, blood glucose data, labs including HgbA1c, lipids, serum chemistries.  Ordered tests.   Risk of complications and morbidities: Is definite because of use of insulin and risk of hypoglycemia.  Prescription medications reviewed and modifications made.  Compliance assessed.  Addressed social determinants of health including food insecurity.    Plan   1. Type 2 diabetes mellitus with hyperglycemia, with long-term current use of insulin (Multi)  - POCT glycosylated hemoglobin (Hb A1C) manually resulted  - Ozempic 2 mg/dose (8 mg/3 mL) pen injector; Inject 2 mg under the skin every 7 days.  Dispense: 9 mL; Refill: 1    -A1c ordered and reviewed.   -CGM data downloaded and reviewed.   -Labs reviewed.     -A1c improved but remains above target - running high most of the day typically.   -Will increase Ozempic to 2mg weekly to maximize glucose control and weight loss benefits.   -Will incrase Toujeo to 130 units daily and Humalog to a base of 40 units with meals plus ISF 10 > 150.     2. Mixed hyperlipidemia  -On statin and tolerating.   -Continue current therapy.     3. Primary hypertension  -BP at target today.   -Continue current therapy.         -labs/tests/notes reviewed  -reviewed and counseled patient on medication monitoring and side effects    Follow Up: 2 months Clarice     Treatment and plan discussed with Dr. Javed Smith.   CARLOS Hand, PharmD, BCACP, CDCES.

## 2024-08-27 NOTE — PATIENT INSTRUCTIONS
Your A1c was 9.1% today.     INCREASE Ozempic to 2mg once weekly.   - You can take two shots of the 1mg dose to use up your current supply.     INCREASE Toujeo to 130 units once daily.     INCREASE Humalog to 40 units with meals, plus your sliding scale.     Follow up with Clarice and Dr. Smith in 2 months.

## 2024-08-28 NOTE — PROGRESS NOTES
Attestation signed by Javed Smith MD on 8/28/24 at 7:47 AM.    I, Dr Javed Smith, have reviewed this progress note, medication list, vital signs, any pertinent lab values, and any CGM data if present with the [facility] and  face to face during this visit today. This note reflects the treatment plan that was made under my direction after reviewing the above mentioned elements while face to face with the patient and CDE.  I personally answered and addressed any questions and concerns the patient had during the visit today.  The CDE entered the data in this note under my direction and I personally reviewed it, signed any lab or medication orders that I instructed to be completed. I am the billing provider for this visit and the level of service was determined by my involvement in the Medical Decision Making Component of this visit while face to face with the patient.

## 2024-10-07 ENCOUNTER — APPOINTMENT (OUTPATIENT)
Dept: PULMONOLOGY | Facility: CLINIC | Age: 64
End: 2024-10-07
Payer: MEDICARE

## 2024-10-08 ENCOUNTER — APPOINTMENT (OUTPATIENT)
Dept: ORTHOPEDIC SURGERY | Facility: CLINIC | Age: 64
End: 2024-10-08
Payer: MEDICARE

## 2024-10-08 ENCOUNTER — OFFICE VISIT (OUTPATIENT)
Dept: PAIN MEDICINE | Facility: CLINIC | Age: 64
End: 2024-10-08
Payer: MEDICARE

## 2024-10-08 ENCOUNTER — HOSPITAL ENCOUNTER (OUTPATIENT)
Dept: RADIOLOGY | Facility: HOSPITAL | Age: 64
Discharge: HOME | End: 2024-10-08
Payer: MEDICARE

## 2024-10-08 VITALS
HEART RATE: 79 BPM | OXYGEN SATURATION: 92 % | DIASTOLIC BLOOD PRESSURE: 71 MMHG | SYSTOLIC BLOOD PRESSURE: 111 MMHG | RESPIRATION RATE: 18 BRPM

## 2024-10-08 DIAGNOSIS — M25.561 ACUTE PAIN OF RIGHT KNEE: Primary | ICD-10-CM

## 2024-10-08 DIAGNOSIS — M25.561 ACUTE PAIN OF RIGHT KNEE: ICD-10-CM

## 2024-10-08 DIAGNOSIS — M17.0 PRIMARY OSTEOARTHRITIS OF BOTH KNEES: Primary | ICD-10-CM

## 2024-10-08 PROCEDURE — 1125F AMNT PAIN NOTED PAIN PRSNT: CPT | Performed by: ORTHOPAEDIC SURGERY

## 2024-10-08 PROCEDURE — 20610 DRAIN/INJ JOINT/BURSA W/O US: CPT | Performed by: ORTHOPAEDIC SURGERY

## 2024-10-08 PROCEDURE — 1159F MED LIST DOCD IN RCRD: CPT | Performed by: NURSE PRACTITIONER

## 2024-10-08 PROCEDURE — 73564 X-RAY EXAM KNEE 4 OR MORE: CPT | Mod: RIGHT SIDE | Performed by: RADIOLOGY

## 2024-10-08 PROCEDURE — 99213 OFFICE O/P EST LOW 20 MIN: CPT | Performed by: NURSE PRACTITIONER

## 2024-10-08 PROCEDURE — 1125F AMNT PAIN NOTED PAIN PRSNT: CPT | Performed by: NURSE PRACTITIONER

## 2024-10-08 PROCEDURE — 3078F DIAST BP <80 MM HG: CPT | Performed by: NURSE PRACTITIONER

## 2024-10-08 PROCEDURE — 3074F SYST BP LT 130 MM HG: CPT | Performed by: NURSE PRACTITIONER

## 2024-10-08 PROCEDURE — 99214 OFFICE O/P EST MOD 30 MIN: CPT | Performed by: ORTHOPAEDIC SURGERY

## 2024-10-08 PROCEDURE — 1123F ACP DISCUSS/DSCN MKR DOCD: CPT | Performed by: ORTHOPAEDIC SURGERY

## 2024-10-08 PROCEDURE — 1160F RVW MEDS BY RX/DR IN RCRD: CPT | Performed by: ORTHOPAEDIC SURGERY

## 2024-10-08 PROCEDURE — 73564 X-RAY EXAM KNEE 4 OR MORE: CPT | Mod: RT

## 2024-10-08 PROCEDURE — 1159F MED LIST DOCD IN RCRD: CPT | Performed by: ORTHOPAEDIC SURGERY

## 2024-10-08 PROCEDURE — 1123F ACP DISCUSS/DSCN MKR DOCD: CPT | Performed by: NURSE PRACTITIONER

## 2024-10-08 PROCEDURE — 1036F TOBACCO NON-USER: CPT | Performed by: ORTHOPAEDIC SURGERY

## 2024-10-08 PROCEDURE — 1160F RVW MEDS BY RX/DR IN RCRD: CPT | Performed by: NURSE PRACTITIONER

## 2024-10-08 RX ORDER — BACLOFEN 10 MG/1
10 TABLET ORAL 3 TIMES DAILY
Qty: 90 TABLET | Refills: 2 | Status: SHIPPED | OUTPATIENT
Start: 2024-10-08 | End: 2025-10-03

## 2024-10-08 RX ORDER — OXYCODONE HYDROCHLORIDE 10 MG/1
10 TABLET ORAL EVERY 6 HOURS PRN
Qty: 112 TABLET | Refills: 0 | Status: SHIPPED | OUTPATIENT
Start: 2024-12-06 | End: 2025-01-03

## 2024-10-08 RX ORDER — PREGABALIN 150 MG/1
150 CAPSULE ORAL 3 TIMES DAILY
Qty: 90 CAPSULE | Refills: 2 | Status: SHIPPED | OUTPATIENT
Start: 2024-10-08 | End: 2025-01-06

## 2024-10-08 RX ORDER — TRIAMCINOLONE ACETONIDE 40 MG/ML
1 INJECTION, SUSPENSION INTRA-ARTICULAR; INTRAMUSCULAR
Status: COMPLETED | OUTPATIENT
Start: 2024-10-08 | End: 2024-10-08

## 2024-10-08 RX ORDER — OXYCODONE HYDROCHLORIDE 10 MG/1
10 TABLET ORAL EVERY 6 HOURS PRN
Qty: 112 TABLET | Refills: 0 | Status: SHIPPED | OUTPATIENT
Start: 2024-10-11 | End: 2024-11-08

## 2024-10-08 RX ORDER — OXYCODONE HYDROCHLORIDE 10 MG/1
10 TABLET ORAL EVERY 6 HOURS PRN
Qty: 112 TABLET | Refills: 0 | Status: SHIPPED | OUTPATIENT
Start: 2024-11-08 | End: 2024-12-06

## 2024-10-08 ASSESSMENT — ENCOUNTER SYMPTOMS
BACK PAIN: 1
LIGHT-HEADEDNESS: 0
CARDIOVASCULAR NEGATIVE: 1
EYES NEGATIVE: 1
HEADACHES: 0
NUMBNESS: 0
PSYCHIATRIC NEGATIVE: 1
ARTHRALGIAS: 1
HEMATOLOGIC/LYMPHATIC NEGATIVE: 1
GASTROINTESTINAL NEGATIVE: 1
RESPIRATORY NEGATIVE: 1
ENDOCRINE NEGATIVE: 1
SEIZURES: 0
WEAKNESS: 1
HIP PAIN: 1
DIZZINESS: 0
MYALGIAS: 1
FACIAL ASYMMETRY: 0
JOINT SWELLING: 1
SPEECH DIFFICULTY: 0
CONSTITUTIONAL NEGATIVE: 1
ALLERGIC/IMMUNOLOGIC NEGATIVE: 1
NECK STIFFNESS: 0
NECK PAIN: 0
TREMORS: 0

## 2024-10-08 ASSESSMENT — PAIN DESCRIPTION - DESCRIPTORS: DESCRIPTORS: SHARP

## 2024-10-08 ASSESSMENT — PAIN SCALES - GENERAL
PAINLEVEL_OUTOF10: 9
PAINLEVEL_OUTOF10: 4
PAINLEVEL: 4

## 2024-10-08 ASSESSMENT — PAIN - FUNCTIONAL ASSESSMENT: PAIN_FUNCTIONAL_ASSESSMENT: 0-10

## 2024-10-08 NOTE — PROGRESS NOTES
Subjective   Patient ID: Jayy Coles is a 64 y.o. male who presents for chronic pain management.    Hip Pain   Pertinent negatives include no numbness.   Knee Pain   Pertinent negatives include no numbness.   Back Pain  Associated symptoms include weakness. Pertinent negatives include no headaches or numbness.   Med Refill  Associated symptoms include arthralgias, joint swelling, myalgias and weakness. Pertinent negatives include no headaches, neck pain or numbness.       Jayy follows up for interval reevaluation of his chronic left knee pain and is status post surgeries x7 to the knee. He is with chronic knee pain of aching and stiffness but also with burning sharp pain to the knee. Does have weakness. No falls since last office visit.    History of right intra-articular iknee injection from orthopedic surgeon 10/8/2024. Injections help to reduce pain and improve function up to 50%.      Oxycodone 10 mg 4 times a day along with Lyrica 150 mg 3 times a day, baclofen 10 mg 3 times daily helps to reduce pain and improve function to the left knee up to 100% for several hours. Average pain score is 4 to 5 out of 10 with medication.  At rest pain is a 1 out of a 10.  Denies negative side effects from medication.     Has an average quality of family and social life with current condition and treatment. Has not been to the ED for pain since last office visit.     Toxicology consistent April 17, 2024.  Annual controlled substance agreement and opioid risk tool are completed and scanned into the chart January 18, 2024.    For continuity:   Given the patient's report of reduced pain and improved functional ability without adverse effects, it is reasonable to treat with narcotic medications. The terms of the opioid agreement as well as the potential risks and adverse effects of the patient's medication regimen were discussed in detail. This includes if applicable due to dosage of medication permission to discuss and  coordinate care with other treatment providers relevant to the patients condition. The patient verbalized understanding.     Risks and side effects of chronic opioid therapy including but not limited to tolerance, dependence, constipation, hyperalgesia, cognitive side effects, addiction and possible death due to overuse and or misuse were discussed. I also discussed that such medications when co-administered with other sedative agents including but not limited to alcohol, benzodiazepines, sedative hypnotics and illegal drugs could pose life threatening consequences including death. I also explained the impact that the administration of such medication has on a patient with obstructive sleep apnea and continued recommendations for use of apnea devices if ordered are prescribed by other physicians. In order to effectively and safely treat the pain, I also emphasized the importance of compliance with the treatment plan, as well as compliance with the terms of the opioid agreement, which was reviewed in detail. I explained the importance of being responsible with the medications and to take these only as prescribed, never in excess and never for reasons other than pain reduction. The patient was counseled on keeping the medications safe and locked away from children and other adults as well as disposal methods and options. The patient understood the risks and instructions.     I also discussed with the patient in detail that based on the clinical response to the opioid medications and improvements of activities of daily living, sleep, and work performance in light of compliance with the treatment plan we can continue this form of therapy for the above chronic pain. The goal and rationale used for current treatment with chronic opioid medication is to control the pain and alleviate disability induced by the chronic pain condition noted above after failures of other non-opioid and nonpharmacological modalities to treat  the chronic pain and the symptoms associated with have failed. The patient understood the goals in terms of the above treatment plan and had no further questions prior to leaving the office today.     Of note, the above-mentioned diagnoses/conditions and expected fluctuating nature of pain, and pain characteristic changes may lead to prolonged functional impairment requiring frequent and multiple reassessments with continued high level medical decision making. As noted, medication and medication management may require opiate therapy in excess of a routine less than 30 day medication requirement. The patient may require daily opiate therapy necessitating month-long prescription medication as noted above in order to perform activities of daily living and achieve acceptable quality of life with respect to their chronic pain condition for the foreseeable future. We monitor our patient's carefully through drug monitoring, medication counts, urine drug testing specific to their medication as well as a myriad of other substances and with frequent follow-ups with interval reassement of the chronic pain condition, its pathophysiology and prognosis.     The level of clinical decision making at this office visit is high due to high risks and complications including mortality and morbidity related to acute and chronic pain with respects to life, bodily function, and treatment. Risks and clinical decisions with respect to under treatment, failure to maintain adequate treatment, and/or overtreatment complications and outcomes were discussed with the patient with respect to their chronic pain conditions, interventional therapies, as well as the use of various medications including possible controlled/dangerous medications. The amount and complexity of reviewed data at this in subsequent office visits is high given patient's fluctuating clinical presentation, laboratory and radiographic reports, prescription monitoring program  data, and medication history as well as other relevant data as noted above. Pertinent negatives and positives data was used in consideration for the above-mentioned high complexity.      Given the patient's total MED, general use of daily opiates, or other coadministered medications in various classes the patient was offered a prescription for Narcan. I instructed the patient that it is important that patient fill this medication in order to demonstrate understanding of the gravity of possible side effects including respiratory depression and risk of overdose of this opiate load or medication combination. As such patient will be required to bring Narcan prescription to follow-up appointments as part of compliance with continued opiate care.     With respect to opiate induced constipation I discussed multiple ways to combat this problem including staying hydrated and taking over-the-counter medications such as Dulcolax, Miralax and Senna. If these treatments are not effective we could consider such medications as Amitiza, Linzess and Movantik.     Disclaimer: This note was transcribed using an audio transcription device. As such, minor errors may be present with regard to spelling, punctuation, and inadvertent word insertion. Please disregard such errors.    Results/Data  Xray Knee Complete 4 or more View [id number] 10:53AM Carl Mosquera      Test Name Result Flag Reference   Xray Knee Complete 4 or more View (Report)       FINAL REPORT  Interpreted by: DAPHNE PATHAK CHRISTOPHER, MD   05/12/23 08:11  MRN: [id number]  Patient Name: LAWRENCE VILLALOBOS     STUDY:  KNEE; COMPLT, 4 OR MORE VIEWS     INDICATION:  AP WB bilat in one shot , PA 30 degree flex WB bilat in one shot (no  orthoball), LAT merchant view 30 degree flex bilat in one shot (no  orthoball) M25.562: Chronic pain of left knee G89.29:.     COMPARISON:  May 5, 2022     ACCESSION NUMBER(S):  [id number]     ORDERING CLINICIAN:  CARL MOSQUERA      FINDINGS:  Long-stem revision left total knee arthroplasty. Appearance is  unchanged from prior examination. Prior medial femoral condylar  fracture unchanged. No new lucencies or malalignment.     IMPRESSION:  Unchanged appearance of the long-stem revision left total knee  arthroplasty without evidence of new complication.     Electronically signed by: DAPHNE PATHAK  05/12/23 08:11       Review of Systems   Constitutional: Negative.    HENT: Negative.     Eyes: Negative.    Respiratory: Negative.          Awaiting order for portable oxygen machine from insurance.  Is with shortness of breath during exertion such as walking and getting up around about.   Cardiovascular: Negative.    Gastrointestinal: Negative.    Endocrine: Negative.    Genitourinary: Negative.    Musculoskeletal:  Positive for arthralgias, back pain, gait problem, joint swelling and myalgias. Negative for neck pain and neck stiffness.   Skin: Negative.    Allergic/Immunologic: Negative.    Neurological:  Positive for weakness. Negative for dizziness, tremors, seizures, syncope, facial asymmetry, speech difficulty, light-headedness, numbness and headaches.   Hematological: Negative.    Psychiatric/Behavioral: Negative.         Objective   Physical Exam  Vitals and nursing note reviewed.   Constitutional:       Appearance: Normal appearance.   HENT:      Head: Normocephalic and atraumatic.   Eyes:      Conjunctiva/sclera: Conjunctivae normal.   Cardiovascular:      Pulses: Normal pulses.   Pulmonary:      Effort: Pulmonary effort is normal. No respiratory distress.      Comments: Not Wearing portable oxygen 3L nasal cannula. Brought portable to office visit.  Musculoskeletal:      Right lower leg: Edema present.      Left lower leg: Edema present.      Comments: Trace to +1 pitting edema to ankles and pretibial areas.    Right knee with pain during extension and flexion.  Joint line tenderness to palpation.    Right knee with generalized mild  swelling.    Left knee with pain during extension and flexion.  Joint line tenderness to palpation.    Pain with palpation over the well-healed left knee linear scar.    Generalized mild swelling.    Ambulates with mildly antalgic gait.   Skin:     General: Skin is warm and dry.      Capillary Refill: Capillary refill takes 2 to 3 seconds.   Neurological:      Mental Status: He is alert and oriented to person, place, and time.      Cranial Nerves: Cranial nerve deficit present.      Sensory: Sensory deficit present.      Motor: Weakness present.      Gait: Gait abnormal.   Psychiatric:         Mood and Affect: Mood normal.         Behavior: Behavior normal.     Jayy was seen today for knee pain.  Diagnoses and all orders for this visit:  Primary osteoarthritis of both knees       Follow-up 12 weeks.      Reviewed and approved by MALLIKA NEIL on 10/8/24 at 9:42 AM.

## 2024-10-08 NOTE — PROGRESS NOTES
This is a consultation from Dr. Ivonne Valerio, GEOVANNI-CNP for No chief complaint on file.      This is a 64 y.o. male who presents for follow-up for his right knee.  Patient has severe right knee arthritis, had cortisone injection several months ago.  It worked well and gave him relief for a while.  In the last few weeks has had return of his symptoms exacerbation of his pain, sharp pain over the medial knee worse with walking proving with rest.  Significantly limits his activities.  He takes over-the-counter Tylenol.    Physical Exam    There has been no interval change in this patient's past medical, surgical, medications, allergies, family history or social history since the most recent visit to a provider within our department. 14 point review of systems was performed, reviewed, and negative except for pertinent positives documented in the history of present illness.     Constitutional: well developed, well nourished male in no acute distress  Psychiatric: normal mood, appropriate affect  Eyes: sclera anicteric  HENT: normocephalic/atraumatic  CV: regular rate and rhythm   Respiratory: non labored breathing  Integumentary: no rash  Neurological: moves all extremities    Right knee exam: skin intact no lacerations or abrations. no effusion.  Tender medial joint line. negative log roll negative patellar grind. ROM 0-120. stable to varus and valgus stress at 0 and 30 degrees. negative lachman negative posterior drawer negative vianca. 5/5 ehl/fhl/gs/ta. silt s/s/sp/dp/t. 2+ dp/pt        Xrays were ordered by me, they were reviewed and independently interpreted by me today, they show severe degenerative disease bone-on-bone arthritis of the right knee    L Inj/Asp: R knee on 10/8/2024 9:10 AM  Indications: pain and joint swelling  Details: 22 G needle, anterolateral approach  Medications: 1 mL triamcinolone acetonide 40 mg/mL    Discussion:  I discussed the conservative treatment options for knee osteoarthritis  including but not limited to physical therapy, oral NSAIDS, activity and lifestyle modification, and corticosteroid injections. Pt has elected to undergo a cortisone injection today. I have explained the risk and benefits of an injection including the possibility of joint infection, bleeding, damage to cartilage, allergic reaction. Patient verbalized understanding and gave verbal consent wishes to proceed with a intra-articular cortisone injection for their knee.    Procedure:  After discussing the risk and benefits of the procedure, we proceeded with an intra-articular right knee injection. We discussed the risks and benefits and potential morbidity related to the treatment, and to the prescription medication administered in the injection    With the patient's informed verbal consent, the right knee was prepped in standard sterile fashion with Chlorhexidine. The skin was then anesthetized with ethyl chloride spray and cleaned again with Chlorhexidine. The knee was then apirated/injected with a prefilled 20-gauge syringe of 40 mg Kenalog + 4 ml Lidocaine using the lateral approach without complications.  The patient tolerated this well and felt immediate initial relief of symptoms. A bandaid was applied and the patient ambulated out of the clinic on ther own accord without difficulty. Patient was instructed to avoid physical activity for 24-48 hours to prevent the knees from swelling and may ice the knees as tolerated. Patient should contact the office if any signs of of infection appear: redness, fever, chills, drainage, swelling or warmth to the knees.  Pt understands that the injections can be repeated no sooner than 3 months.    Procedure, treatment alternatives, risks and benefits explained, specific risks discussed. Consent was given by the patient. Immediately prior to procedure a time out was called to verify the correct patient, procedure, equipment, support staff and site/side marked as required. Patient  "was prepped and draped in the usual sterile fashion.         Impression/Plan: This is a 64 y.o. male with severe right knee arthritis.  I had an in depth discussion with the patient regarding treatment options for arthritis and their relative risks and benefits. We reviewed surgical and nonsurgical option for treatment. Treatments include anti inflammatory medications, physical therapy, weight loss, activity modification, use of assistive devices, injection therapies. We discussed current prescriptions and risks and benefits of continuation of prescription medication as apporpriate. We discussed that arthritis is often progressive over time, an in end stage arthritis surgical interventions can be considered, including arthroplasty. All questions were answered and the patient voiced their understanding.  Continue nonsurgical treatments, not a surgical candidate given what happened on his other knee and his BMI.  I will see him back as needed    BMI Readings from Last 1 Encounters:   08/27/24 47.11 kg/m²      Lab Results   Component Value Date    CREATININE 1.01 08/15/2023     Tobacco Use: Low Risk  (10/8/2024)    Patient History    • Smoking Tobacco Use: Never    • Smokeless Tobacco Use: Never    • Passive Exposure: Never      Computed MELD 3.0 unavailable. One or more values for this score either were not found within the given timeframe or did not fit some other criterion.  Computed MELD-Na unavailable. One or more values for this score either were not found within the given timeframe or did not fit some other criterion.       Lab Results   Component Value Date    HGBA1C 9.1 (A) 08/27/2024     No results found for: \"STAPHMRSASCR\"  "

## 2024-10-10 ENCOUNTER — TELEPHONE (OUTPATIENT)
Dept: ORTHOPEDIC SURGERY | Facility: CLINIC | Age: 64
End: 2024-10-10
Payer: MEDICARE

## 2024-10-10 DIAGNOSIS — J45.20 MILD INTERMITTENT ASTHMA WITHOUT COMPLICATION (HHS-HCC): ICD-10-CM

## 2024-10-10 NOTE — TELEPHONE ENCOUNTER
Pt called regarding hinged knee brace.  One tried on him in the office 2x did not fit.  He was given a list of places to go to find one but no one had them.  I spoke to Haydee about this call and she is going to inquire with  DME for a 3X or 4X.  She will call the patient.

## 2024-10-14 ENCOUNTER — HOSPITAL ENCOUNTER (OUTPATIENT)
Dept: RESPIRATORY THERAPY | Facility: HOSPITAL | Age: 64
End: 2024-10-14
Payer: MEDICARE

## 2024-10-21 ENCOUNTER — HOSPITAL ENCOUNTER (OUTPATIENT)
Dept: RESPIRATORY THERAPY | Facility: HOSPITAL | Age: 64
End: 2024-10-21
Payer: MEDICARE

## 2024-10-28 ENCOUNTER — HOSPITAL ENCOUNTER (OUTPATIENT)
Dept: RESPIRATORY THERAPY | Facility: HOSPITAL | Age: 64
Discharge: HOME | End: 2024-10-28
Payer: MEDICARE

## 2024-10-28 DIAGNOSIS — J45.20 MILD INTERMITTENT ASTHMA WITHOUT COMPLICATION (HHS-HCC): ICD-10-CM

## 2024-10-28 DIAGNOSIS — J45.909 UNCOMPLICATED ASTHMA, UNSPECIFIED ASTHMA SEVERITY, UNSPECIFIED WHETHER PERSISTENT (HHS-HCC): ICD-10-CM

## 2024-10-28 PROCEDURE — 94060 EVALUATION OF WHEEZING: CPT | Performed by: PEDIATRICS

## 2024-10-28 PROCEDURE — 94729 DIFFUSING CAPACITY: CPT

## 2024-10-28 PROCEDURE — 94664 DEMO&/EVAL PT USE INHALER: CPT | Mod: 59

## 2024-10-28 PROCEDURE — 94060 EVALUATION OF WHEEZING: CPT

## 2024-10-28 PROCEDURE — 94727 GAS DIL/WSHOT DETER LNG VOL: CPT

## 2024-10-28 PROCEDURE — 94618 PULMONARY STRESS TESTING: CPT | Performed by: PEDIATRICS

## 2024-10-28 PROCEDURE — 94729 DIFFUSING CAPACITY: CPT | Performed by: PEDIATRICS

## 2024-10-28 PROCEDURE — 94618 PULMONARY STRESS TESTING: CPT

## 2024-10-28 PROCEDURE — 94727 GAS DIL/WSHOT DETER LNG VOL: CPT | Performed by: PEDIATRICS

## 2024-10-29 ENCOUNTER — APPOINTMENT (OUTPATIENT)
Dept: ENDOCRINOLOGY | Facility: CLINIC | Age: 64
End: 2024-10-29
Payer: MEDICARE

## 2024-10-29 VITALS
DIASTOLIC BLOOD PRESSURE: 68 MMHG | HEIGHT: 69 IN | WEIGHT: 315 LBS | HEART RATE: 75 BPM | BODY MASS INDEX: 46.65 KG/M2 | SYSTOLIC BLOOD PRESSURE: 93 MMHG

## 2024-10-29 DIAGNOSIS — E11.65 TYPE 2 DIABETES MELLITUS WITH HYPERGLYCEMIA, WITH LONG-TERM CURRENT USE OF INSULIN: Primary | ICD-10-CM

## 2024-10-29 DIAGNOSIS — E78.2 MIXED HYPERLIPIDEMIA: ICD-10-CM

## 2024-10-29 DIAGNOSIS — I10 PRIMARY HYPERTENSION: ICD-10-CM

## 2024-10-29 DIAGNOSIS — Z79.4 TYPE 2 DIABETES MELLITUS WITH HYPERGLYCEMIA, WITH LONG-TERM CURRENT USE OF INSULIN: Primary | ICD-10-CM

## 2024-10-29 LAB
MGC ASCENT PFT - FEV1 - POST: 1.78
MGC ASCENT PFT - FEV1 - PRE: 1.87
MGC ASCENT PFT - FEV1 - PREDICTED: 3.07
MGC ASCENT PFT - FVC - POST: 2.27
MGC ASCENT PFT - FVC - PRE: 2.49
MGC ASCENT PFT - FVC - PREDICTED: 4.01
POC HEMOGLOBIN A1C: 8.4 % (ref 4.2–6.5)

## 2024-10-29 PROCEDURE — 99214 OFFICE O/P EST MOD 30 MIN: CPT | Performed by: INTERNAL MEDICINE

## 2024-10-29 PROCEDURE — 83036 HEMOGLOBIN GLYCOSYLATED A1C: CPT | Performed by: INTERNAL MEDICINE

## 2024-10-29 RX ORDER — TIRZEPATIDE 5 MG/.5ML
5 INJECTION, SOLUTION SUBCUTANEOUS
Qty: 6 ML | Refills: 1 | Status: SHIPPED | OUTPATIENT
Start: 2024-10-29 | End: 2024-10-31 | Stop reason: SDUPTHER

## 2024-10-29 NOTE — PROGRESS NOTES
Subjective   Patient ID: Jayy Coles is a 64 y.o. male who presents for Diabetes.  HPI  62 yo previous pt of Dr. Booker with DM Type 2 diagnosed age 43, HTN, HLD, IBS, Afib with Covid 2021, HFpEF (Dr. Alix CHUNG) OA Knees, O2 night time use presents for follow up.     Last A1c 9.1% - today 8.4%.   Patient testing sugars > 4 times day with Casandra 3. Following carb controlled diet somewhat and know reasonable carb allowances. Patient able to afford their medications now. Patient is not exercising.    Taking Toujeo 130 units, Humalog 40 units with meals plus ISF 10 > 150, jardiance 25mg daily, and Ozempic 2mg weekly (tolerating well).   -Metformin intolerant  -No TZD due to CHF    Taking metoprolol tartrate 25mg BID and furosemide 80mg BID for htn/heart.   Taking rosuvastatin 10 mg daily for lipids.   -LDL 92 (has been taking Colestipol for IBS)        30 day Casandra 3 report downloaded and attached: 50% in target, 0% lows, avg bs 186. Running mid-upper 100's throughout the day and overnight, with some excursions into the 200's around meals.   -had a steroid injection in his knee 10/8 resulting in elevated sugars for 2-3 days.     Current Outpatient Medications:     albuterol 90 mcg/actuation inhaler, 2 puffs every 4 hours if needed for shortness of breath or wheezing (cough)., Disp: , Rfl:     baclofen (Lioresal) 10 mg tablet, Take 1 tablet (10 mg) by mouth 3 times a day., Disp: 90 tablet, Rfl: 2    blood-glucose sensor (FreeStyle Casandra 3 Sensor) device, Apply one sensor to the skin every 14 days to test blood sugars, Disp: 2 each, Rfl: 11    cephalexin (Keflex) 500 mg capsule, Take 1 capsule (500 mg) by mouth 2 times a day., Disp: 90 capsule, Rfl: 3    empagliflozin (Jardiance) 25 mg, Take 1 tablet (25 mg) by mouth once daily., Disp: 90 tablet, Rfl: 3    FreeStyle glucose monitoring (FreeStyle Lite Meter) kit, 1 each., Disp: , Rfl:     FreeStyle Lite Strips strip, 4 times a day., Disp: , Rfl:     furosemide (Lasix) 80  mg tablet, Take 1 tablet (80 mg) by mouth 2 times a day. (Patient taking differently: Take 100 mg by mouth 2 times a day.), Disp: 180 tablet, Rfl: 3    HumaLOG KwikPen Insulin 100 unit/mL injection, Use as directed with meals up to 110 units a day disp 105 ml 90 day supply, Disp: 105 mL, Rfl: 3    insulin glargine (Toujeo Max U-300 SoloStar) 300 unit/mL (3 mL) injection, INJECT UP  UNITS UNDER THE SKIN EVERY DAY, Disp: 36 mL, Rfl: 3    metoprolol tartrate (Lopressor) 25 mg tablet, Take 1 tablet (25 mg) by mouth 2 times a day., Disp: 180 tablet, Rfl: 3    Mounjaro 5 mg/0.5 mL pen injector, Inject 5 mg under the skin every 7 days., Disp: 6 mL, Rfl: 1    naloxone (Narcan) 4 mg/0.1 mL nasal spray, Administer 1 spray (4 mg) into affected nostril(s) if needed for opioid reversal or respiratory depression., Disp: 2 each, Rfl: 0    nystatin (Mycostatin) cream, APPLY AND RUB IN A THIN LAYER TOPICALLY TO THE AFFECTED AREA TWICE DAILY IN THE MORNING AND IN THE EVENING, Disp: , Rfl:     oxyCODONE (Roxicodone) 10 mg immediate release tablet, Take 1 tablet (10 mg) by mouth 4 times a day as needed for moderate pain (4 - 6) for up to 28 days. Do not start before March 22, 2024., Disp: 112 tablet, Rfl: 0    oxyCODONE (Roxicodone) 10 mg immediate release tablet, Take 1 tablet (10 mg) by mouth every 6 hours if needed for severe pain (7 - 10) for up to 28 days. Do not fill before October 11, 2024., Disp: 112 tablet, Rfl: 0    [START ON 11/8/2024] oxyCODONE (Roxicodone) 10 mg immediate release tablet, Take 1 tablet (10 mg) by mouth every 6 hours if needed for severe pain (7 - 10) for up to 28 days. Do not fill before November 8, 2024., Disp: 112 tablet, Rfl: 0    [START ON 12/6/2024] oxyCODONE (Roxicodone) 10 mg immediate release tablet, Take 1 tablet (10 mg) by mouth every 6 hours if needed for severe pain (7 - 10) for up to 28 days. Do not fill before December 6, 2024., Disp: 112 tablet, Rfl: 0    Ozempic 2 mg/dose (8 mg/3 mL)  "pen injector, Inject 2 mg under the skin every 7 days., Disp: 9 mL, Rfl: 1    pen needle, diabetic (BD Ultra-Fine Short Pen Needle) 31 gauge x 5/16\" needle, Use as directed, Disp: , Rfl:     pregabalin (Lyrica) 150 mg capsule, Take 1 capsule (150 mg) by mouth 3 times a day., Disp: 90 capsule, Rfl: 2    rosuvastatin (Crestor) 10 mg tablet, Take 1 tablet (10 mg) by mouth once daily., Disp: 90 tablet, Rfl: 3    Symbicort 160-4.5 mcg/actuation inhaler, Inhale 2 puffs 2 times a day. (Patient taking differently: Inhale 2 puffs if needed.), Disp: 1 each, Rfl: 11   Allergies   Allergen Reactions    Topiramate Shortness of breath    Terbinafine Unknown    Penicillins Rash     \"red and hot\"       Review of Systems  See HPI.     Objective   BP 93/68   Pulse 75   Ht 1.753 m (5' 9\")   Wt 144 kg (317 lb)   BMI 46.81 kg/m²     Labs:   Lab Results   Component Value Date    HGBA1C 8.4 (A) 10/29/2024     Lab Results   Component Value Date    CALCIUM 9.1 08/15/2023    AST 20 08/15/2023    ALKPHOS 93 08/15/2023    BILITOT 0.5 08/15/2023    PROT 7.7 08/15/2023    ALBUMIN 4.1 08/15/2023     08/15/2023    K 3.9 08/15/2023    CL 99 08/15/2023    CO2 28 08/15/2023    ANIONGAP 14 08/15/2023    BUN 24 (H) 08/15/2023    CREATININE 1.01 08/15/2023    GLUCOSE 194 (H) 08/15/2023    ALT 25 08/15/2023     Lab Results   Component Value Date    WBC 11.4 (H) 08/15/2023    NRBC CANCELED 08/15/2023    RBC 5.79 08/15/2023    HGB 17.3 08/15/2023    HCT 53.6 (H) 08/15/2023     08/15/2023     Lab Results   Component Value Date    CHOL 205 (H) 08/15/2023    TRIG 344 (H) 08/15/2023    HDL 44.2 08/15/2023     Lab Results   Component Value Date    CREATU 56.4 08/15/2023     Lab Results   Component Value Date    TSH 4.03 (H) 08/15/2023     No results found for: \"JIXXUJAH22\"  No results found for: \"VITD25\"  No results found for: \"PTH\"  Lab Results   Component Value Date    MG 2.31 08/15/2023       Assessment    1. Type 2 diabetes mellitus with " hyperglycemia, with long-term current use of insulin    2. Mixed hyperlipidemia    3. Primary hypertension        Medical Decision Making  Complexity of problem: Chronic illness of diabetes mellitus uncontrolled, progressing  Data analyzed and reviewed: Reviewed prior notes, blood glucose data, labs including HgbA1c, lipids, serum chemistries.  Ordered tests.   Risk of complications and morbidities: Is definite because of use of insulin and risk of hypoglycemia.  Prescription medications reviewed and modifications made.  Compliance assessed.  Addressed social determinants of health including food insecurity.    Plan   1. Type 2 diabetes mellitus with hyperglycemia, with long-term current use of insulin (Primary)  - POCT glycosylated hemoglobin (Hb A1C) manually resulted    -A1c ordered and reviewed.   -CGM data downloaded and reviewed.   -Labs reviewed.     -Slight improvement in glycemic control, although remains above target.   -Would benefit from switch to Mounjaro to maximize glycemic control and weight loss.   -Will start Mounjaro 5mg weekly in place of Ozempic 2mg weekly.   -Continue all other medications the same - call the office for any questions.   -Repeat labs before next visit.     2. Mixed hyperlipidemia  -On statin and tolerating.   -Continue current therapy.     3. Primary hypertension  -BP at target today.   -Continue current therapy.         -labs/tests/notes reviewed  -reviewed and counseled patient on medication monitoring and side effects    Follow Up: 4 months Dr. Smith     Treatment and plan discussed with Dr. Javed Smith.   CARLOS Hand, PharmD, BCACP, CDCES.

## 2024-10-29 NOTE — PATIENT INSTRUCTIONS
Your A1c was 8.4% today.     Continue Ozempic 2mg weekly for now - once you get the Mounjaro 5mg you can STOP the Ozempic.     START Mounjaro 5mg once weekly.   -This likely needs a prior authorization, so it may take a week or so to get approved before the pharmacy can fill it.     Continue Jardiance 25mg daily.     Continue Toujeo 130 units once daily and Humalog 40 units with meals plus your sliding scale.     Get your blood work done any time before your next visit at any  Lab.     Follow up Dr. Smith in 4 months.

## 2024-10-31 DIAGNOSIS — E11.65 TYPE 2 DIABETES MELLITUS WITH HYPERGLYCEMIA, WITH LONG-TERM CURRENT USE OF INSULIN: ICD-10-CM

## 2024-10-31 DIAGNOSIS — Z79.4 TYPE 2 DIABETES MELLITUS WITH HYPERGLYCEMIA, WITH LONG-TERM CURRENT USE OF INSULIN: ICD-10-CM

## 2024-10-31 RX ORDER — TIRZEPATIDE 5 MG/.5ML
5 INJECTION, SOLUTION SUBCUTANEOUS
Qty: 6 ML | Refills: 1 | Status: SHIPPED | OUTPATIENT
Start: 2024-10-31

## 2024-11-01 PROCEDURE — RXMED WILLOW AMBULATORY MEDICATION CHARGE

## 2024-11-04 ENCOUNTER — TELEPHONE (OUTPATIENT)
Dept: ENDOCRINOLOGY | Facility: CLINIC | Age: 64
End: 2024-11-04
Payer: MEDICARE

## 2024-11-06 ENCOUNTER — PHARMACY VISIT (OUTPATIENT)
Dept: PHARMACY | Facility: CLINIC | Age: 64
End: 2024-11-06
Payer: MEDICARE

## 2024-11-07 NOTE — TELEPHONE ENCOUNTER
Spoke with pt via phone - pt asking if he should finish his supply of Ozempic first before starting Mounjaro. Advised pt to start Mounjaro 5mg weekly once he receives it from the pharmacy and hold onto his Ozempic in case we need to go back to it in the future for any reason.   Pt also reporting low sugars after meals - specifically lunch and sometimes dinner. Reviewed Casandra data - pt is dropping into the 50's after lunch the last few days. Advised to lower Humalog by 5u every 3 days as needed for low sugars under 100. Pt reports understanding.

## 2024-11-08 NOTE — PROGRESS NOTES
Attestation signed by Javed Smith MD on 11/8/24 at 8:48 AM.    I, Dr Javed Smith, have reviewed this progress note, medication list, vital signs, any pertinent lab values, and any CGM data if present with the [facility] and  face to face during this visit today. This note reflects the treatment plan that was made under my direction after reviewing the above mentioned elements while face to face with the patient and CDE.  I personally answered and addressed any questions and concerns the patient had during the visit today.  The CDE entered the data in this note under my direction and I personally reviewed it, signed any lab or medication orders that I instructed to be completed. I am the billing provider for this visit and the level of service was determined by my involvement in the Medical Decision Making Component of this visit while face to face with the patient.

## 2024-11-21 ENCOUNTER — OFFICE VISIT (OUTPATIENT)
Dept: PULMONOLOGY | Facility: CLINIC | Age: 64
End: 2024-11-21
Payer: MEDICARE

## 2024-11-21 VITALS
RESPIRATION RATE: 16 BRPM | DIASTOLIC BLOOD PRESSURE: 79 MMHG | OXYGEN SATURATION: 90 % | WEIGHT: 315 LBS | BODY MASS INDEX: 46.59 KG/M2 | HEART RATE: 72 BPM | SYSTOLIC BLOOD PRESSURE: 109 MMHG

## 2024-11-21 DIAGNOSIS — G47.30 BREATHING-RELATED SLEEP DISORDER: ICD-10-CM

## 2024-11-21 DIAGNOSIS — Z99.81 ON HOME OXYGEN THERAPY: ICD-10-CM

## 2024-11-21 DIAGNOSIS — J45.20 MILD INTERMITTENT ASTHMA, UNSPECIFIED WHETHER COMPLICATED (HHS-HCC): Primary | ICD-10-CM

## 2024-11-21 DIAGNOSIS — U09.9 POST COVID-19 CONDITION, UNSPECIFIED: ICD-10-CM

## 2024-11-21 PROCEDURE — 3074F SYST BP LT 130 MM HG: CPT | Performed by: INTERNAL MEDICINE

## 2024-11-21 PROCEDURE — 1160F RVW MEDS BY RX/DR IN RCRD: CPT | Performed by: INTERNAL MEDICINE

## 2024-11-21 PROCEDURE — 1125F AMNT PAIN NOTED PAIN PRSNT: CPT | Performed by: INTERNAL MEDICINE

## 2024-11-21 PROCEDURE — 99213 OFFICE O/P EST LOW 20 MIN: CPT | Performed by: INTERNAL MEDICINE

## 2024-11-21 PROCEDURE — 1159F MED LIST DOCD IN RCRD: CPT | Performed by: INTERNAL MEDICINE

## 2024-11-21 PROCEDURE — 3078F DIAST BP <80 MM HG: CPT | Performed by: INTERNAL MEDICINE

## 2024-11-21 PROCEDURE — 1036F TOBACCO NON-USER: CPT | Performed by: INTERNAL MEDICINE

## 2024-11-21 PROCEDURE — 1123F ACP DISCUSS/DSCN MKR DOCD: CPT | Performed by: INTERNAL MEDICINE

## 2024-11-21 ASSESSMENT — ENCOUNTER SYMPTOMS
FEVER: 0
SLEEPING PROBLEMS DURING THE LAST MONTH: 1
CHILLS: 0
SHORTNESS OF BREATH: 1
GASTROINTESTINAL NEGATIVE: 1
PSYCHIATRIC NEGATIVE: 1
CARDIOVASCULAR NEGATIVE: 1
COUGH: 0
NEUROLOGICAL NEGATIVE: 1

## 2024-11-21 ASSESSMENT — PAIN SCALES - GENERAL: PAINLEVEL_OUTOF10: 7

## 2024-11-21 NOTE — PROGRESS NOTES
Subjective   Patient ID: Jayy Coles is a 64 y.o. male who presents for Lung Eval.  h/o asthma (+methacholine 2012), mod restriction 10/2024 previously admitted w/ COVID here for f/u.  3L when he uses it. Completed CA. CT-chest w/ stable GGOs.  Still with significant dyspnea without his oxygen.  Has significant knee issues.  6MWT showed he did ok on RA.  No fevers, chills, cough.  On Symbicort. Rarely uses rescue. ET is limited by knee pain.  Has issues w/ burping affecting his CPAP compliance.  To try a nasal mask.    Sleeping Problem  Pertinent negatives include no chills, coughing, fever or rash.       Review of Systems   Constitutional:  Negative for chills and fever.   Respiratory:  Positive for shortness of breath. Negative for cough.    Cardiovascular: Negative.    Gastrointestinal: Negative.    Skin:  Negative for rash.   Neurological: Negative.    Psychiatric/Behavioral: Negative.     All other systems reviewed and are negative.      Objective   Physical Exam  Vitals reviewed.   Constitutional:       Appearance: He is obese.   HENT:      Head: Normocephalic and atraumatic.   Eyes:      Extraocular Movements: Extraocular movements intact.   Cardiovascular:      Rate and Rhythm: Normal rate and regular rhythm.      Heart sounds: Normal heart sounds.   Pulmonary:      Effort: Pulmonary effort is normal.      Comments: Coarse breath sounds bilaterally   Abdominal:      Palpations: Abdomen is soft.      Tenderness: There is no abdominal tenderness.   Musculoskeletal:      Cervical back: Normal range of motion.   Skin:     General: Skin is warm.   Neurological:      General: No focal deficit present.      Mental Status: He is alert and oriented to person, place, and time. Mental status is at baseline.   Psychiatric:         Mood and Affect: Mood normal.         Behavior: Behavior normal.         Assessment/Plan   Problem List Items Addressed This Visit       Asthma - Primary     Mod restriction 10/2024. Cont  Symbicort 160mcg.          On home oxygen therapy     Cont 3L as needed.  Also some cardiac component.  Cont diuretics.         Post covid-19 condition, unspecified     GGOs likely related to post COVID inflammation.  Stable on most recent imaging.         Breathing-related sleep disorder     Severe RICCI - having issues w/ burping.  Will try nasal mask.  Check compliance next visit.          RTC in 4-6 months.    Time Spent  Prep time on day of patient encounter: 5 minutes  Time spent directly with patient, family or caregiver: 10 minutes  Additional Time Spent on Patient Care Activities: 0 minutes  Documentation Time: 5 minutes  Other Time Spent: 0 minutes  Total: 20 minutes        Dusty Vásquez MD 11/21/24 7:51 PM

## 2024-11-22 DIAGNOSIS — R06.00 DYSPNEA, UNSPECIFIED TYPE: ICD-10-CM

## 2024-11-22 RX ORDER — BUDESONIDE AND FORMOTEROL FUMARATE DIHYDRATE 160; 4.5 UG/1; UG/1
2 AEROSOL RESPIRATORY (INHALATION) 2 TIMES DAILY
Qty: 1 G | Refills: 11 | Status: SHIPPED | OUTPATIENT
Start: 2024-11-22

## 2024-11-25 DIAGNOSIS — Z79.4 TYPE 2 DIABETES MELLITUS WITH HYPERGLYCEMIA, WITH LONG-TERM CURRENT USE OF INSULIN: Primary | ICD-10-CM

## 2024-11-25 DIAGNOSIS — E11.65 TYPE 2 DIABETES MELLITUS WITH HYPERGLYCEMIA, WITH LONG-TERM CURRENT USE OF INSULIN: Primary | ICD-10-CM

## 2024-11-25 RX ORDER — BLOOD-GLUCOSE,RECEIVER,CONT
EACH MISCELLANEOUS
Qty: 1 EACH | Refills: 0 | Status: SHIPPED | OUTPATIENT
Start: 2024-11-25

## 2024-12-18 ENCOUNTER — OFFICE VISIT (OUTPATIENT)
Dept: PAIN MEDICINE | Facility: CLINIC | Age: 64
End: 2024-12-18
Payer: MEDICARE

## 2024-12-18 VITALS
SYSTOLIC BLOOD PRESSURE: 112 MMHG | HEART RATE: 70 BPM | DIASTOLIC BLOOD PRESSURE: 69 MMHG | RESPIRATION RATE: 18 BRPM | OXYGEN SATURATION: 90 %

## 2024-12-18 DIAGNOSIS — M17.0 PRIMARY OSTEOARTHRITIS OF BOTH KNEES: Primary | ICD-10-CM

## 2024-12-18 DIAGNOSIS — Z96.652 STATUS POST REVISION OF TOTAL REPLACEMENT OF LEFT KNEE: ICD-10-CM

## 2024-12-18 PROCEDURE — 99213 OFFICE O/P EST LOW 20 MIN: CPT | Performed by: NURSE PRACTITIONER

## 2024-12-18 PROCEDURE — 1123F ACP DISCUSS/DSCN MKR DOCD: CPT | Performed by: NURSE PRACTITIONER

## 2024-12-18 PROCEDURE — 1159F MED LIST DOCD IN RCRD: CPT | Performed by: NURSE PRACTITIONER

## 2024-12-18 PROCEDURE — 3074F SYST BP LT 130 MM HG: CPT | Performed by: NURSE PRACTITIONER

## 2024-12-18 PROCEDURE — 1160F RVW MEDS BY RX/DR IN RCRD: CPT | Performed by: NURSE PRACTITIONER

## 2024-12-18 PROCEDURE — 1036F TOBACCO NON-USER: CPT | Performed by: NURSE PRACTITIONER

## 2024-12-18 PROCEDURE — 3078F DIAST BP <80 MM HG: CPT | Performed by: NURSE PRACTITIONER

## 2024-12-18 PROCEDURE — 1125F AMNT PAIN NOTED PAIN PRSNT: CPT | Performed by: NURSE PRACTITIONER

## 2024-12-18 RX ORDER — PREGABALIN 150 MG/1
150 CAPSULE ORAL 3 TIMES DAILY
Qty: 90 CAPSULE | Refills: 2 | Status: SHIPPED | OUTPATIENT
Start: 2024-12-18 | End: 2025-03-18

## 2024-12-18 RX ORDER — OXYCODONE HYDROCHLORIDE 10 MG/1
10 TABLET ORAL 4 TIMES DAILY PRN
Qty: 112 TABLET | Refills: 0 | Status: SHIPPED | OUTPATIENT
Start: 2024-12-19 | End: 2025-01-16

## 2024-12-18 RX ORDER — OXYCODONE HYDROCHLORIDE 10 MG/1
10 TABLET ORAL 4 TIMES DAILY PRN
Qty: 112 TABLET | Refills: 0 | Status: SHIPPED | OUTPATIENT
Start: 2025-01-16 | End: 2025-02-13

## 2024-12-18 RX ORDER — OXYCODONE HYDROCHLORIDE 10 MG/1
10 TABLET ORAL 4 TIMES DAILY PRN
Qty: 112 TABLET | Refills: 0 | Status: SHIPPED | OUTPATIENT
Start: 2025-02-13 | End: 2025-03-13

## 2024-12-18 RX ORDER — BACLOFEN 10 MG/1
10 TABLET ORAL 3 TIMES DAILY
Qty: 90 TABLET | Refills: 2 | Status: SHIPPED | OUTPATIENT
Start: 2024-12-18 | End: 2025-12-13

## 2024-12-18 ASSESSMENT — ENCOUNTER SYMPTOMS
NUMBNESS: 0
LIGHT-HEADEDNESS: 0
HIP PAIN: 1
CONSTITUTIONAL NEGATIVE: 1
WEAKNESS: 1
SEIZURES: 0
FACIAL ASYMMETRY: 0
ARTHRALGIAS: 1
GASTROINTESTINAL NEGATIVE: 1
RESPIRATORY NEGATIVE: 1
ENDOCRINE NEGATIVE: 1
NECK STIFFNESS: 0
MYALGIAS: 1
JOINT SWELLING: 1
SPEECH DIFFICULTY: 0
TREMORS: 0
BACK PAIN: 1
PSYCHIATRIC NEGATIVE: 1
HEMATOLOGIC/LYMPHATIC NEGATIVE: 1
DIZZINESS: 0
HEADACHES: 0
NECK PAIN: 0
CARDIOVASCULAR NEGATIVE: 1
ALLERGIC/IMMUNOLOGIC NEGATIVE: 1
EYES NEGATIVE: 1

## 2024-12-18 ASSESSMENT — PAIN SCALES - GENERAL: PAINLEVEL_OUTOF10: 3

## 2024-12-18 ASSESSMENT — PAIN - FUNCTIONAL ASSESSMENT: PAIN_FUNCTIONAL_ASSESSMENT: 0-10

## 2024-12-18 NOTE — PROGRESS NOTES
Subjective   Patient ID: Jayy Coles is a 64 y.o. male who presents for chronic pain management.    Hip Pain   Pertinent negatives include no numbness.   Knee Pain   Pertinent negatives include no numbness.   Back Pain  Associated symptoms include weakness. Pertinent negatives include no headaches or numbness.   Med Refill  Associated symptoms include arthralgias, joint swelling, myalgias and weakness. Pertinent negatives include no headaches, neck pain or numbness.       Jayy follows up for interval reevaluation of his chronic left knee pain and is status post surgeries x7 to the knee. He is with chronic knee pain of aching and stiffness but also with burning sharp pain to the knee. Does have weakness. No falls since last office visit.    History of right intra-articular iknee injection from orthopedic surgeon 10/8/2024. Injections help to reduce pain and improve function up to 50%.  Next injection after 1/8/2025.    Oxycodone 10 mg 4 times a day along with Lyrica 150 mg 3 times a day, baclofen 10 mg 3 times daily helps to reduce pain and improve function to the left knee up to 100% for several hours. Average pain score is 4 to 5 out of 10 with medication.  At rest pain is a 1 out of a 10.  Denies negative side effects from medication.    Is with insomnia.  Discussed with Jayy today melatonin immediate release and extended release to take at bedtime together to help him fall asleep and stay asleep.     Has an average quality of family and social life with current condition and treatment. Has not been to the ED for pain since last office visit.     Toxicology consistent April 17, 2024.  Annual controlled substance agreement and opioid risk tool are completed and scanned into the chart January 18, 2024.    For continuity:   Given the patient's report of reduced pain and improved functional ability without adverse effects, it is reasonable to treat with narcotic medications. The terms of the opioid agreement as  well as the potential risks and adverse effects of the patient's medication regimen were discussed in detail. This includes if applicable due to dosage of medication permission to discuss and coordinate care with other treatment providers relevant to the patients condition. The patient verbalized understanding.     Risks and side effects of chronic opioid therapy including but not limited to tolerance, dependence, constipation, hyperalgesia, cognitive side effects, addiction and possible death due to overuse and or misuse were discussed. I also discussed that such medications when co-administered with other sedative agents including but not limited to alcohol, benzodiazepines, sedative hypnotics and illegal drugs could pose life threatening consequences including death. I also explained the impact that the administration of such medication has on a patient with obstructive sleep apnea and continued recommendations for use of apnea devices if ordered are prescribed by other physicians. In order to effectively and safely treat the pain, I also emphasized the importance of compliance with the treatment plan, as well as compliance with the terms of the opioid agreement, which was reviewed in detail. I explained the importance of being responsible with the medications and to take these only as prescribed, never in excess and never for reasons other than pain reduction. The patient was counseled on keeping the medications safe and locked away from children and other adults as well as disposal methods and options. The patient understood the risks and instructions.     I also discussed with the patient in detail that based on the clinical response to the opioid medications and improvements of activities of daily living, sleep, and work performance in light of compliance with the treatment plan we can continue this form of therapy for the above chronic pain. The goal and rationale used for current treatment with chronic  opioid medication is to control the pain and alleviate disability induced by the chronic pain condition noted above after failures of other non-opioid and nonpharmacological modalities to treat the chronic pain and the symptoms associated with have failed. The patient understood the goals in terms of the above treatment plan and had no further questions prior to leaving the office today.     Of note, the above-mentioned diagnoses/conditions and expected fluctuating nature of pain, and pain characteristic changes may lead to prolonged functional impairment requiring frequent and multiple reassessments with continued high level medical decision making. As noted, medication and medication management may require opiate therapy in excess of a routine less than 30 day medication requirement. The patient may require daily opiate therapy necessitating month-long prescription medication as noted above in order to perform activities of daily living and achieve acceptable quality of life with respect to their chronic pain condition for the foreseeable future. We monitor our patient's carefully through drug monitoring, medication counts, urine drug testing specific to their medication as well as a myriad of other substances and with frequent follow-ups with interval reassement of the chronic pain condition, its pathophysiology and prognosis.     The level of clinical decision making at this office visit is high due to high risks and complications including mortality and morbidity related to acute and chronic pain with respects to life, bodily function, and treatment. Risks and clinical decisions with respect to under treatment, failure to maintain adequate treatment, and/or overtreatment complications and outcomes were discussed with the patient with respect to their chronic pain conditions, interventional therapies, as well as the use of various medications including possible controlled/dangerous medications. The amount and  complexity of reviewed data at this in subsequent office visits is high given patient's fluctuating clinical presentation, laboratory and radiographic reports, prescription monitoring program data, and medication history as well as other relevant data as noted above. Pertinent negatives and positives data was used in consideration for the above-mentioned high complexity.      Given the patient's total MED, general use of daily opiates, or other coadministered medications in various classes the patient was offered a prescription for Narcan. I instructed the patient that it is important that patient fill this medication in order to demonstrate understanding of the gravity of possible side effects including respiratory depression and risk of overdose of this opiate load or medication combination. As such patient will be required to bring Narcan prescription to follow-up appointments as part of compliance with continued opiate care.     With respect to opiate induced constipation I discussed multiple ways to combat this problem including staying hydrated and taking over-the-counter medications such as Dulcolax, Miralax and Senna. If these treatments are not effective we could consider such medications as Amitiza, Linzess and Movantik.     Disclaimer: This note was transcribed using an audio transcription device. As such, minor errors may be present with regard to spelling, punctuation, and inadvertent word insertion. Please disregard such errors.    Results/Data  Xray Knee Complete 4 or more View [id number] 10:53AM Carl Mosquera      Test Name Result Flag Reference   Xray Knee Complete 4 or more View (Report)       FINAL REPORT  Interpreted by: DAPHNE PATHAK CHRISTOPHER, MD   05/12/23 08:11  MRN: [id number]  Patient Name: LAWRENCE VILLALOBOS     STUDY:  KNEE; COMPLT, 4 OR MORE VIEWS     INDICATION:  AP WB bilat in one shot , PA 30 degree flex WB bilat in one shot (no  orthoball), LAT merchant view 30 degree flex bilat in one  shot (no  orthoball) M25.562: Chronic pain of left knee G89.29:.     COMPARISON:  May 5, 2022     ACCESSION NUMBER(S):  [id number]     ORDERING CLINICIAN:  MARY WILLOUGHBY     FINDINGS:  Long-stem revision left total knee arthroplasty. Appearance is  unchanged from prior examination. Prior medial femoral condylar  fracture unchanged. No new lucencies or malalignment.     IMPRESSION:  Unchanged appearance of the long-stem revision left total knee  arthroplasty without evidence of new complication.     Electronically signed by: DPAHNE PATHAK  05/12/23 08:11       Review of Systems   Constitutional: Negative.    HENT: Negative.     Eyes: Negative.    Respiratory: Negative.          Awaiting order for portable oxygen machine from insurance.  Is with shortness of breath during exertion such as walking and getting up around about.   Cardiovascular: Negative.    Gastrointestinal: Negative.    Endocrine: Negative.    Genitourinary: Negative.    Musculoskeletal:  Positive for arthralgias, back pain, gait problem, joint swelling and myalgias. Negative for neck pain and neck stiffness.   Skin: Negative.    Allergic/Immunologic: Negative.    Neurological:  Positive for weakness. Negative for dizziness, tremors, seizures, syncope, facial asymmetry, speech difficulty, light-headedness, numbness and headaches.   Hematological: Negative.    Psychiatric/Behavioral: Negative.         Objective   Physical Exam  Vitals and nursing note reviewed.   Constitutional:       Appearance: Normal appearance.   HENT:      Head: Normocephalic and atraumatic.   Eyes:      Conjunctiva/sclera: Conjunctivae normal.   Cardiovascular:      Pulses: Normal pulses.   Pulmonary:      Effort: Pulmonary effort is normal. No respiratory distress.      Comments: Not Wearing portable oxygen 3L nasal cannula. Brought portable to office visit.  Musculoskeletal:      Right lower leg: Edema present.      Left lower leg: Edema present.      Comments: Trace to +1  pitting edema to ankles and pretibial areas.    Right knee with pain during extension and flexion.  Joint line tenderness to palpation.    Right knee with generalized mild swelling.    Left knee with pain during extension and flexion.  Joint line tenderness to palpation.    Pain with palpation over the well-healed left knee linear scar.    Generalized mild swelling.    Ambulates with mildly antalgic gait.   Skin:     General: Skin is warm and dry.      Capillary Refill: Capillary refill takes 2 to 3 seconds.   Neurological:      Mental Status: He is alert and oriented to person, place, and time.      Cranial Nerves: Cranial nerve deficit present.      Sensory: Sensory deficit present.      Motor: Weakness present.      Gait: Gait abnormal.   Psychiatric:         Mood and Affect: Mood normal.         Behavior: Behavior normal.     Jayy was seen today for back pain and knee pain.  Diagnoses and all orders for this visit:  Primary osteoarthritis of both knees (Primary)  -     oxyCODONE (Roxicodone) 10 mg immediate release tablet; Take 1 tablet (10 mg) by mouth 4 times a day as needed for severe pain (7 - 10) for up to 28 days. Do not fill before December 19, 2024.  -     oxyCODONE (Roxicodone) 10 mg immediate release tablet; Take 1 tablet (10 mg) by mouth 4 times a day as needed for severe pain (7 - 10) for up to 28 days. Do not fill before January 16, 2025.  -     oxyCODONE (Roxicodone) 10 mg immediate release tablet; Take 1 tablet (10 mg) by mouth 4 times a day as needed for severe pain (7 - 10) for up to 28 days. Do not fill before February 13, 2025.  -     pregabalin (Lyrica) 150 mg capsule; Take 1 capsule (150 mg) by mouth 3 times a day.  -     baclofen (Lioresal) 10 mg tablet; Take 1 tablet (10 mg) by mouth 3 times a day.  Status post revision of total replacement of left knee       Follow-up 12 weeks.      Reviewed and approved by MALLIKA NEIL on 12/18/24 at 1:14 PM.

## 2024-12-30 DIAGNOSIS — Z96.652 STATUS POST REVISION OF TOTAL REPLACEMENT OF LEFT KNEE: ICD-10-CM

## 2024-12-30 RX ORDER — CEPHALEXIN 500 MG/1
500 CAPSULE ORAL 2 TIMES DAILY
Qty: 90 CAPSULE | Refills: 3 | Status: SHIPPED | OUTPATIENT
Start: 2024-12-30

## 2025-01-17 PROCEDURE — RXMED WILLOW AMBULATORY MEDICATION CHARGE

## 2025-01-20 DIAGNOSIS — Z79.4 TYPE 2 DIABETES MELLITUS WITH DIABETIC POLYNEUROPATHY, WITH LONG-TERM CURRENT USE OF INSULIN: ICD-10-CM

## 2025-01-20 DIAGNOSIS — E11.42 TYPE 2 DIABETES MELLITUS WITH DIABETIC POLYNEUROPATHY, WITH LONG-TERM CURRENT USE OF INSULIN: ICD-10-CM

## 2025-01-20 RX ORDER — INSULIN GLARGINE 300 [IU]/ML
INJECTION, SOLUTION SUBCUTANEOUS
Qty: 100 ML | Refills: 3 | Status: SHIPPED | OUTPATIENT
Start: 2025-01-20

## 2025-01-21 ENCOUNTER — PHARMACY VISIT (OUTPATIENT)
Dept: PHARMACY | Facility: CLINIC | Age: 65
End: 2025-01-21
Payer: COMMERCIAL

## 2025-01-21 DIAGNOSIS — E78.2 MIXED HYPERLIPIDEMIA: ICD-10-CM

## 2025-01-21 RX ORDER — ROSUVASTATIN CALCIUM 10 MG/1
10 TABLET, COATED ORAL DAILY
Qty: 90 TABLET | Refills: 3 | Status: SHIPPED | OUTPATIENT
Start: 2025-01-21

## 2025-01-23 ENCOUNTER — TELEPHONE (OUTPATIENT)
Dept: CARDIOLOGY | Facility: HOSPITAL | Age: 65
End: 2025-01-23

## 2025-01-24 DIAGNOSIS — I50.30 HEART FAILURE WITH PRESERVED LEFT VENTRICULAR FUNCTION (HFPEF): ICD-10-CM

## 2025-01-24 RX ORDER — FUROSEMIDE 80 MG/1
80 TABLET ORAL 2 TIMES DAILY
Qty: 180 TABLET | Refills: 3 | Status: SHIPPED | OUTPATIENT
Start: 2025-01-24 | End: 2026-01-24

## 2025-02-11 ENCOUNTER — TELEPHONE (OUTPATIENT)
Dept: PRIMARY CARE | Facility: CLINIC | Age: 65
End: 2025-02-11
Payer: MEDICARE

## 2025-02-11 NOTE — TELEPHONE ENCOUNTER
Patient called in stated that he needs Lur[facility] to refill his pain medication, explained that he would have to establish care and make Lurdes his primary care provider in order to be seen by her and make a new patient visit, Asked for a call back from provider, Advise?

## 2025-02-12 NOTE — TELEPHONE ENCOUNTER
Left pt a message explaining to him to call pain management along with pain management number. I also left the office number in case he had any questions.

## 2025-02-17 NOTE — PROGRESS NOTES
HPI   63 yo previous pt of Dr. Booker with DM Type 2 diagnosed age 43, HTN, HLD, IBS, Afib with Covid 2021, HFpEF (Dr. Thomson ) OA Knees, O2 night time use presents for follow up. Last A1c  8.4%, today 8%.    Patient testing sugars > 4 times day with Casandra 3. Following carb controlled diet somewhat and know reasonable carb allowances. Patient able to afford their medications now. Patient is not exercising.     Taking Toujeo 130 units, Humalog 40 units with meals (35 at lunch) plus ISF 10 > 150, jardiance 25mg daily, and mounjaro 5mg (in place of ozempic 2mg).   -Metformin intolerant  -No TZD due to CHF     Taking metoprolol tartrate 25mg BID and furosemide 80mg BID for htn/heart.     Taking rosuvastatin 10 mg daily for lipids.   -LDL 92 (has been taking Colestipol for IBS)        30 day Casandra 3 report downloaded and attached: 56% in range, 0% lows, pattern:   Upper 100's overnight (not sleeping well), mid 200's waking, middle of day mid 100's, after dinner upper 100's into bedtime.    -on lyrica with pain management (neuropathy in foot/hands)    -has orders for fasting labs today, pt did not do      Current Outpatient Medications:     albuterol 90 mcg/actuation inhaler, 2 puffs every 4 hours if needed for shortness of breath or wheezing (cough)., Disp: , Rfl:     baclofen (Lioresal) 10 mg tablet, Take 1 tablet (10 mg) by mouth 3 times a day., Disp: 90 tablet, Rfl: 2    blood-glucose sensor (FreeStyle Casandra 3 Sensor) device, Apply one sensor to the skin every 14 days to test blood sugars, Disp: 2 each, Rfl: 11    cephalexin (Keflex) 500 mg capsule, Take 1 capsule (500 mg) by mouth 2 times a day., Disp: 90 capsule, Rfl: 3    empagliflozin (Jardiance) 25 mg, Take 1 tablet (25 mg) by mouth once daily., Disp: 90 tablet, Rfl: 3    FreeStyle glucose monitoring (FreeStyle Lite Meter) kit, 1 each., Disp: , Rfl:     FreeStyle Casandra 3 Mcclellan misc, Use as instructed to check blood sugar, Disp: 1 each, Rfl: 0    FreeStyle Lite  Strips strip, 4 times a day., Disp: , Rfl:     furosemide (Lasix) 80 mg tablet, Take 1 tablet (80 mg) by mouth 2 times a day., Disp: 180 tablet, Rfl: 3    HumaLOG KwikPen Insulin 100 unit/mL injection, Use as directed with meals up to 110 units a day disp 105 ml 90 day supply, Disp: 105 mL, Rfl: 3    insulin glargine (Toujeo Max U-300 SoloStar) 300 unit/mL (3 mL) injection, Up to 100 units daily, Take as directed per insulin instructions., Disp: 100 mL, Rfl: 3    metoprolol tartrate (Lopressor) 25 mg tablet, Take 1 tablet (25 mg) by mouth 2 times a day., Disp: 180 tablet, Rfl: 3    Mounjaro 5 mg/0.5 mL pen injector, Inject 5 mg under the skin every 7 days., Disp: 6 mL, Rfl: 1    naloxone (Narcan) 4 mg/0.1 mL nasal spray, Administer 1 spray (4 mg) into affected nostril(s) if needed for opioid reversal or respiratory depression., Disp: 2 each, Rfl: 0    nystatin (Mycostatin) cream, APPLY AND RUB IN A THIN LAYER TOPICALLY TO THE AFFECTED AREA TWICE DAILY IN THE MORNING AND IN THE EVENING, Disp: , Rfl:     oxyCODONE (Roxicodone) 10 mg immediate release tablet, Take 1 tablet (10 mg) by mouth every 6 hours if needed for severe pain (7 - 10) for up to 28 days. Do not fill before December 6, 2024., Disp: 112 tablet, Rfl: 0    oxyCODONE (Roxicodone) 10 mg immediate release tablet, Take 1 tablet (10 mg) by mouth 4 times a day as needed for severe pain (7 - 10) for up to 28 days. Do not fill before December 19, 2024., Disp: 112 tablet, Rfl: 0    oxyCODONE (Roxicodone) 10 mg immediate release tablet, Take 1 tablet (10 mg) by mouth 4 times a day as needed for severe pain (7 - 10) for up to 28 days. Do not fill before January 16, 2025., Disp: 112 tablet, Rfl: 0    oxyCODONE (Roxicodone) 10 mg immediate release tablet, Take 1 tablet (10 mg) by mouth 4 times a day as needed for severe pain (7 - 10) for up to 28 days. Do not fill before February 13, 2025., Disp: 112 tablet, Rfl: 0    Ozempic 2 mg/dose (8 mg/3 mL) pen injector,  "Inject 2 mg under the skin every 7 days. (Patient not taking: Reported on 11/21/2024), Disp: 9 mL, Rfl: 1    pen needle, diabetic (BD Ultra-Fine Short Pen Needle) 31 gauge x 5/16\" needle, Use as directed, Disp: , Rfl:     pregabalin (Lyrica) 150 mg capsule, Take 1 capsule (150 mg) by mouth 3 times a day., Disp: 90 capsule, Rfl: 2    rosuvastatin (Crestor) 10 mg tablet, Take 1 tablet (10 mg) by mouth once daily., Disp: 90 tablet, Rfl: 3    Symbicort 160-4.5 mcg/actuation inhaler, Inhale 2 puffs 2 times a day., Disp: 1 g, Rfl: 11      Allergies as of 02/18/2025 - Reviewed 02/18/2025   Allergen Reaction Noted    Topiramate Shortness of breath 05/21/2024    Terbinafine Unknown and Swelling 03/21/2023    Penicillins Rash and Other 03/21/2023         Review of Systems   Cardiology: Lightheadedness-denies.  Chest pain-denies.  Leg edema-denies.  Palpitations-denies.  Respiratory: Cough-denies. Shortness of breath-denies.  Wheezing-denies.  Gastroenterology: Constipation-mild Diarrhea-denies.  Heartburn-denies.  Endocrinology: Cold intolerance-denies.  Heat intolerance-denies.  Sweats-denies.  Neurology: Headache-denies.  Tremor-denies.  Neuropathy in extremities + feet  Psychology: Low energy-denies.  Irritability-denies.  Sleep disturbances-denies.      /65 (BP Location: Right arm, Patient Position: Sitting, BP Cuff Size: Adult)   Pulse 76   Wt 139 kg (306 lb 9.6 oz)   BMI 45.28 kg/m²       Labs:  Lab Results   Component Value Date    WBC 11.4 (H) 08/15/2023    NRBC CANCELED 08/15/2023    RBC 5.79 08/15/2023    HGB 17.3 08/15/2023    HCT 53.6 (H) 08/15/2023     08/15/2023     Lab Results   Component Value Date    CALCIUM 9.1 08/15/2023    AST 20 08/15/2023    ALKPHOS 93 08/15/2023    BILITOT 0.5 08/15/2023    PROT 7.7 08/15/2023    ALBUMIN 4.1 08/15/2023     08/15/2023    K 3.9 08/15/2023    CL 99 08/15/2023    CO2 28 08/15/2023    ANIONGAP 14 08/15/2023    BUN 24 (H) 08/15/2023    CREATININE 1.01 " "08/15/2023    GLUCOSE 194 (H) 08/15/2023    ALT 25 08/15/2023     Lab Results   Component Value Date    CHOL 205 (H) 08/15/2023    TRIG 344 (H) 08/15/2023    HDL 44.2 08/15/2023     No results found for: \"MICROALBCREA\"  Lab Results   Component Value Date    TSH 4.03 (H) 08/15/2023     No results found for: \"OTPNVSFV82\"  Lab Results   Component Value Date    HGBA1C 8.0 (A) 02/18/2025         Physical Exam   General Appearance: pleasant, cooperative, no acute distress  HEENT: no chemosis, no proptosis, no lid lag, no lid retraction  Neck: no lymphadenopathy, no thyromegaly, no dominant thyroid nodules  Heart: no murmurs, regular rate and rhythm, S1 and S2  Lungs: no wheezes, no rhonci, no rales  Extremities: + lower extremity swelling      Assessment/Plan   1. Type 2 diabetes mellitus with hyperglycemia, with long-term current use of insulin (Primary)  -A1c ordered and reviewed  -dann data reviewed, scanned in epic  -labs reviewed    -sugars elevating overnight (having pain/not sleeping well)  -increase mounjaro from 5 to 7.5mg X 1 month then 10mg weekly until next visit  -consider prandial insulin correction overnight    2. Mixed hyperlipidemia  -on statin, labs reviewed, repeat labs pending    3. Primary hypertension  -at target on therapy, will follow  -sees cardiology as well         Follow Up:  pharmD 6 months    Medical Decision Making  Complexity of problem: Chronic illness of diabetes mellitus uncontrolled, progressing  Data analyzed and reviewed: Reviewed prior notes, blood glucose data, labs including HgbA1c, lipids, serum chemistries.  Ordered tests.   Risk of complications and morbidities: Is definite because of use of insulin and risk of hypoglycemia.  Prescription medications reviewed and modifications made.  Compliance assessed.  Addressed social determinants of health including food insecurity.           "

## 2025-02-18 ENCOUNTER — APPOINTMENT (OUTPATIENT)
Dept: ENDOCRINOLOGY | Facility: CLINIC | Age: 65
End: 2025-02-18
Payer: MEDICARE

## 2025-02-18 VITALS
HEART RATE: 76 BPM | DIASTOLIC BLOOD PRESSURE: 65 MMHG | BODY MASS INDEX: 45.28 KG/M2 | WEIGHT: 306.6 LBS | SYSTOLIC BLOOD PRESSURE: 114 MMHG

## 2025-02-18 DIAGNOSIS — I10 PRIMARY HYPERTENSION: ICD-10-CM

## 2025-02-18 DIAGNOSIS — Z79.4 TYPE 2 DIABETES MELLITUS WITH HYPERGLYCEMIA, WITH LONG-TERM CURRENT USE OF INSULIN: Primary | ICD-10-CM

## 2025-02-18 DIAGNOSIS — E11.65 TYPE 2 DIABETES MELLITUS WITH HYPERGLYCEMIA, WITH LONG-TERM CURRENT USE OF INSULIN: Primary | ICD-10-CM

## 2025-02-18 DIAGNOSIS — E78.2 MIXED HYPERLIPIDEMIA: ICD-10-CM

## 2025-02-18 LAB — POC HEMOGLOBIN A1C: 8 % (ref 4.2–6.5)

## 2025-02-18 PROCEDURE — 99214 OFFICE O/P EST MOD 30 MIN: CPT | Performed by: INTERNAL MEDICINE

## 2025-02-18 PROCEDURE — 1123F ACP DISCUSS/DSCN MKR DOCD: CPT | Performed by: INTERNAL MEDICINE

## 2025-02-18 PROCEDURE — 1036F TOBACCO NON-USER: CPT | Performed by: INTERNAL MEDICINE

## 2025-02-18 PROCEDURE — 3074F SYST BP LT 130 MM HG: CPT | Performed by: INTERNAL MEDICINE

## 2025-02-18 PROCEDURE — 95251 CONT GLUC MNTR ANALYSIS I&R: CPT | Performed by: INTERNAL MEDICINE

## 2025-02-18 PROCEDURE — 1125F AMNT PAIN NOTED PAIN PRSNT: CPT | Performed by: INTERNAL MEDICINE

## 2025-02-18 PROCEDURE — 3078F DIAST BP <80 MM HG: CPT | Performed by: INTERNAL MEDICINE

## 2025-02-18 PROCEDURE — 83036 HEMOGLOBIN GLYCOSYLATED A1C: CPT | Performed by: INTERNAL MEDICINE

## 2025-02-18 PROCEDURE — 1159F MED LIST DOCD IN RCRD: CPT | Performed by: INTERNAL MEDICINE

## 2025-02-18 RX ORDER — PEN NEEDLE, DIABETIC 30 GX3/16"
NEEDLE, DISPOSABLE MISCELLANEOUS
Qty: 400 EACH | Refills: 3 | Status: SHIPPED | OUTPATIENT
Start: 2025-02-18

## 2025-02-18 RX ORDER — TIRZEPATIDE 10 MG/.5ML
10 INJECTION, SOLUTION SUBCUTANEOUS
Qty: 2 ML | Refills: 5 | Status: SHIPPED | OUTPATIENT
Start: 2025-02-18

## 2025-02-18 RX ORDER — TIRZEPATIDE 7.5 MG/.5ML
7.5 INJECTION, SOLUTION SUBCUTANEOUS
Qty: 2 ML | Refills: 0 | Status: SHIPPED | OUTPATIENT
Start: 2025-02-18

## 2025-02-18 ASSESSMENT — PATIENT HEALTH QUESTIONNAIRE - PHQ9
10. IF YOU CHECKED OFF ANY PROBLEMS, HOW DIFFICULT HAVE THESE PROBLEMS MADE IT FOR YOU TO DO YOUR WORK, TAKE CARE OF THINGS AT HOME, OR GET ALONG WITH OTHER PEOPLE: SOMEWHAT DIFFICULT
1. LITTLE INTEREST OR PLEASURE IN DOING THINGS: SEVERAL DAYS
SUM OF ALL RESPONSES TO PHQ9 QUESTIONS 1 AND 2: 2
2. FEELING DOWN, DEPRESSED OR HOPELESS: SEVERAL DAYS

## 2025-02-18 ASSESSMENT — PAIN SCALES - GENERAL: PAINLEVEL_OUTOF10: 6

## 2025-02-18 ASSESSMENT — ENCOUNTER SYMPTOMS: DEPRESSION: 1

## 2025-02-25 DIAGNOSIS — Z79.4 TYPE 2 DIABETES MELLITUS WITH DIABETIC POLYNEUROPATHY, WITH LONG-TERM CURRENT USE OF INSULIN: ICD-10-CM

## 2025-02-25 DIAGNOSIS — E11.42 TYPE 2 DIABETES MELLITUS WITH DIABETIC POLYNEUROPATHY, WITH LONG-TERM CURRENT USE OF INSULIN: ICD-10-CM

## 2025-02-25 RX ORDER — INSULIN LISPRO 100 [IU]/ML
INJECTION, SOLUTION INTRAVENOUS; SUBCUTANEOUS
Qty: 105 ML | Refills: 3 | Status: SHIPPED | OUTPATIENT
Start: 2025-02-25

## 2025-02-28 ENCOUNTER — TELEPHONE (OUTPATIENT)
Dept: PAIN MEDICINE | Facility: CLINIC | Age: 65
End: 2025-02-28
Payer: MEDICARE

## 2025-02-28 DIAGNOSIS — M17.0 PRIMARY OSTEOARTHRITIS OF BOTH KNEES: ICD-10-CM

## 2025-02-28 RX ORDER — OXYCODONE HYDROCHLORIDE 10 MG/1
10 TABLET ORAL EVERY 6 HOURS PRN
Qty: 112 TABLET | Refills: 0 | Status: SHIPPED | OUTPATIENT
Start: 2025-02-28 | End: 2025-03-28

## 2025-02-28 NOTE — TELEPHONE ENCOUNTER
Did not  oxy within 14 days so could not refill.  Wife called would like [place] [facility] [place] [facility].

## 2025-03-06 ENCOUNTER — APPOINTMENT (OUTPATIENT)
Dept: ORTHOPEDIC SURGERY | Facility: CLINIC | Age: 65
End: 2025-03-06
Payer: MEDICARE

## 2025-03-06 DIAGNOSIS — M25.561 ACUTE PAIN OF RIGHT KNEE: Primary | ICD-10-CM

## 2025-03-06 PROCEDURE — 1123F ACP DISCUSS/DSCN MKR DOCD: CPT | Performed by: ORTHOPAEDIC SURGERY

## 2025-03-06 PROCEDURE — 99214 OFFICE O/P EST MOD 30 MIN: CPT | Performed by: ORTHOPAEDIC SURGERY

## 2025-03-06 PROCEDURE — 1036F TOBACCO NON-USER: CPT | Performed by: ORTHOPAEDIC SURGERY

## 2025-03-06 PROCEDURE — 1160F RVW MEDS BY RX/DR IN RCRD: CPT | Performed by: ORTHOPAEDIC SURGERY

## 2025-03-06 PROCEDURE — 1125F AMNT PAIN NOTED PAIN PRSNT: CPT | Performed by: ORTHOPAEDIC SURGERY

## 2025-03-06 PROCEDURE — 1159F MED LIST DOCD IN RCRD: CPT | Performed by: ORTHOPAEDIC SURGERY

## 2025-03-06 PROCEDURE — 20610 DRAIN/INJ JOINT/BURSA W/O US: CPT | Performed by: ORTHOPAEDIC SURGERY

## 2025-03-06 RX ORDER — TRIAMCINOLONE ACETONIDE 40 MG/ML
1 INJECTION, SUSPENSION INTRA-ARTICULAR; INTRAMUSCULAR
Status: COMPLETED | OUTPATIENT
Start: 2025-03-06 | End: 2025-03-06

## 2025-03-06 RX ADMIN — TRIAMCINOLONE ACETONIDE 1 ML: 40 INJECTION, SUSPENSION INTRA-ARTICULAR; INTRAMUSCULAR at 09:20

## 2025-03-06 ASSESSMENT — PAIN - FUNCTIONAL ASSESSMENT: PAIN_FUNCTIONAL_ASSESSMENT: 0-10

## 2025-03-06 ASSESSMENT — PAIN SCALES - GENERAL: PAINLEVEL_OUTOF10: 8

## 2025-03-06 NOTE — PROGRESS NOTES
This is a consultation from Dr. Ivonne Valerio, GEOVANNI-CNP for   Chief Complaint   Patient presents with    Right Knee - Pain       This is a 64 y.o. male who presents for follow-up for his right knee.  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  Injection has been helping him, only last for about a month but he does get significant relief during that time.  He is mostly dependent on a walker at home.  He is having a difficult time walking.  His pains been exacerbated the last few weeks, sharp pain over the medial knee insert normal    Physical Exam    There has been no interval change in this patient's past medical, surgical, medications, allergies, family history or social history since the most recent visit to a provider within our department. 14 point review of systems was performed, reviewed, and negative except for pertinent positives documented in the history of present illness.     Constitutional: well developed, well nourished male in no acute distress  Psychiatric: normal mood, appropriate affect  Eyes: sclera anicteric  HENT: normocephalic/atraumatic  CV: regular rate and rhythm   Respiratory: non labored breathing  Integumentary: no rash  Neurological: moves all extremities    right knee exam: skin intact no lacerations or abrations. no effusion.  Tender medial joint line. negative log roll negative patellar grind. ROM 0-120. stable to varus and valgus stress at 0 and 30 degrees. negative lachman negative posterior drawer negative vianca. 5/5 ehl/fhl/gs/ta. silt s/s/sp/dp/t. 2+ dp/pt          L Inj/Asp: R knee on 3/6/2025 9:20 AM  Indications: pain and joint swelling  Details: 22 G needle, anterolateral approach  Medications: 1 mL triamcinolone acetonide 40 mg/mL    Discussion:  I discussed the conservative treatment options for knee osteoarthritis including but not limited to physical therapy, oral  NSAIDS, activity and lifestyle modification, and corticosteroid injections. Pt has elected to undergo a cortisone injection today. I have explained the risk and benefits of an injection including the possibility of joint infection, bleeding, damage to cartilage, allergic reaction. Patient verbalized understanding and gave verbal consent wishes to proceed with a intra-articular cortisone injection for their knee.    Procedure:  After discussing the risk and benefits of the procedure, we proceeded with an intra-articular right knee injection. We discussed the risks and benefits and potential morbidity related to the treatment, and to the prescription medication administered in the injection    With the patient's informed verbal consent, the right knee was prepped in standard sterile fashion with Chlorhexidine. The skin was then anesthetized with ethyl chloride spray and cleaned again with Chlorhexidine. The knee was then apirated/injected with a prefilled 20-gauge syringe of 40 mg Kenalog + 4 ml Lidocaine using the lateral approach without complications.  The patient tolerated this well and felt immediate initial relief of symptoms. A bandaid was applied and the patient ambulated out of the clinic on ther own accord without difficulty. Patient was instructed to avoid physical activity for 24-48 hours to prevent the knees from swelling and may ice the knees as tolerated. Patient should contact the office if any signs of of infection appear: redness, fever, chills, drainage, swelling or warmth to the knees.  Pt understands that the injections can be repeated no sooner than 3 months.    Procedure, treatment alternatives, risks and benefits explained, specific risks discussed. Consent was given by the patient. Immediately prior to procedure a time out was called to verify the correct patient, procedure, equipment, support staff and site/side marked as required. Patient was prepped and draped in the usual sterile fashion.  "            Impression/Plan: This is a 64 y.o. male with right knee arthritis.  I had an in depth discussion with the patient regarding treatment options for arthritis and their relative risks and benefits. We reviewed surgical and nonsurgical option for treatment. Treatments include anti inflammatory medications, physical therapy, weight loss, activity modification, use of assistive devices, injection therapies. We discussed current prescriptions and risks and benefits of continuation of prescription medication as apporpriate. We discussed that arthritis is often progressive over time, an in end stage arthritis surgical interventions can be considered, including arthroplasty. All questions were answered and the patient voiced their understanding.  Continue injections I will see him back as needed    BMI Readings from Last 1 Encounters:   02/18/25 45.28 kg/m²      Lab Results   Component Value Date    CREATININE 1.01 08/15/2023     Tobacco Use: Low Risk  (3/6/2025)    Patient History     Smoking Tobacco Use: Never     Smokeless Tobacco Use: Never     Passive Exposure: Never      Computed MELD 3.0 unavailable. One or more values for this score either were not found within the given timeframe or did not fit some other criterion.  Computed MELD-Na unavailable. One or more values for this score either were not found within the given timeframe or did not fit some other criterion.       Lab Results   Component Value Date    HGBA1C 8.0 (A) 02/18/2025     No results found for: \"STAPHMRSASCR\"  "

## 2025-03-09 NOTE — PROGRESS NOTES
"Subjective   Reason for Visit: Jayy Coles is an 64 y.o. male here for an INITIAL Medicare Wellness visit.               HPI  Jayy is a very pleasant 63 yo male presenting today for Initial AWV, f/u on chronic illnesses   Pt is requesting referral to long covid clinic     Dx: Hx Afib, ASTHMA, EDEMA, HFpEF, T2DM, ASTHMA, GERD, HLD, OBESITY, RICCI, HTN, IBS, OA (knees), HOME O2 USE   PMH: DVT (RLE), COLON Ca, Covid (2021),   PSH: TKR (left) w/mult revisions    Pt is followed by Cardiology, Dr. Alix Bashiries CP/Palpitations      Pt is followed by Pulmonology, Dr. Vásquez  Has EPRSAUD, feels miserable since Covid      Pt is followed by Ortho, Dr. Mosquera     Pt is followed by Endo, Dr. Smith   Last A1C= 8.0% Feb 2025    Pt has FBW orders per Endo   Colonoscopy is UTD, due 2027    Allergies   Allergen Reactions    Topiramate Shortness of breath    Terbinafine Unknown and Swelling    Penicillins Rash and Other     \"red and hot\"       Patient Care Team:  LINSEY Campoverde as PCP - General  LINSEY Braden as PCP - [facility] Attributed Provider  LINSEY Campoverde       Review of Systems  ROS was completed and all systems are negative with the exception of what was noted in the the HPI.       Objective   Vitals:  There were no vitals taken for this visit.      Past Surgical History:   Procedure Laterality Date    KNEE SURGERY  04/30/2015    Knee Surgery    OTHER SURGICAL HISTORY  04/30/2015    Arthrotomy Of Knee With Medial Meniscectomy    OTHER SURGICAL HISTORY  01/29/2021    Colon surgery    TOTAL KNEE ARTHROPLASTY  04/30/2015    Total Knee Arthroplasty       Current Outpatient Medications   Medication Instructions    albuterol 90 mcg/actuation inhaler 2 puffs, Every 4 hours PRN    baclofen (LIORESAL) 10 mg, oral, 3 times daily    blood-glucose sensor (FreeStyle Casandra 3 Sensor) device Apply one sensor to the skin every 14 days to test blood sugars    cephalexin (KEFLEX) 500 mg, oral, 2 times daily    " "empagliflozin (JARDIANCE) 25 mg, oral, Daily    FreeStyle glucose monitoring (FreeStyle Lite Meter) kit 1 each    FreeStyle Casandra 3 Mey[place] Chuck misc Use as instructed to check blood sugar    FreeStyle Lite Strips strip 4 times daily    furosemide (LASIX) 80 mg, oral, 2 times daily    HumaLOG KwikPen Insulin 100 unit/mL injection Use as directed with meals up to 110 units a day disp 105 ml 90 day supply    insulin glargine (Toujeo Max U-300 SoloStar) 300 unit/mL (3 mL) injection Up to 100 units daily, Take as directed per insulin instructions.    metoprolol tartrate (LOPRESSOR) 25 mg, oral, 2 times daily    Mounjaro 5 mg, subcutaneous, Every 7 days    Mounjaro 7.5 mg, subcutaneous, Once Weekly    Mounjaro 10 mg, subcutaneous, Once Weekly    naloxone (NARCAN) 4 mg, nasal, As needed    nystatin (Mycostatin) cream APPLY AND RUB IN A THIN LAYER TOPICALLY TO THE AFFECTED AREA TWICE DAILY IN THE MORNING AND IN THE EVENING    oxyCODONE (ROXICODONE) 10 mg, oral, 4 times daily PRN    oxyCODONE (ROXICODONE) 10 mg, oral, 4 times daily PRN    oxyCODONE (ROXICODONE) 10 mg, oral, 4 times daily PRN    oxyCODONE (ROXICODONE) 10 mg, oral, Every 6 hours PRN    Ozempic 2 mg, subcutaneous, Every 7 days    pen needle, diabetic (BD Ultra-Fine Short Pen Needle) 31 gauge x 5/16\" needle Use as directed    pen needle, diabetic 32 gauge x 5/32\" needle Use 4 times per day    pregabalin (LYRICA) 150 mg, oral, 3 times daily    rosuvastatin (CRESTOR) 10 mg, oral, Daily    Symbicort 160-4.5 mcg/actuation inhaler 2 puffs, inhalation, 2 times daily         Physical Exam      Assessment & Plan              " "(ROXICODONE) 10 mg, oral, 4 times daily PRN    oxyCODONE (ROXICODONE) 10 mg, oral, 4 times daily PRN    oxyCODONE (ROXICODONE) 10 mg, oral, Every 6 hours PRN    pen needle, diabetic (BD Ultra-Fine Short Pen Needle) 31 gauge x 5/16\" needle Use as directed    pen needle, diabetic 32 gauge x 5/32\" needle Use 4 times per day    pregabalin (LYRICA) 150 mg, oral, 3 times daily    rosuvastatin (CRESTOR) 10 mg, oral, Daily    Symbicort 160-4.5 mcg/actuation inhaler 2 puffs, inhalation, 2 times daily         Physical Exam  Vitals reviewed.   Cardiovascular:      Pulses: Normal pulses.      Heart sounds: Normal heart sounds.   Pulmonary:      Effort: Pulmonary effort is normal.      Breath sounds: Normal breath sounds.   Musculoskeletal:         General: Normal range of motion.   Skin:     General: Skin is warm and dry.   Neurological:      Mental Status: He is alert and oriented to person, place, and time.   Psychiatric:         Mood and Affect: Mood normal.         Thought Content: Thought content normal.           Assessment & Plan  Medicare annual wellness visit, initial  - Counseled on healthy diet and regular exercise  - Fall avoidance information provided  - Personalized prevention plan provided   - Colon  UTD  - Vaccines; GET FLU VACCINE AT YOUR PHARMACY  - FBW ordered          Type 2 diabetes mellitus with hyperglycemia, with long-term current use of insulin  Condition stable based on symptoms and exam.    Continue current medications and follow-up at least yearly.  Followed by Dr. Luis Duggan   FBW is due   Orders:    Follow Up In Primary Care - Established; Future    Mixed hyperlipidemia  Continue rosuvastatin   FLP is due        Body mass index (BMI) 45.0-49.9, adult (Multi)  In a face to face session, I informed patient of his/her BMI > 30.  We discussed appropriate nutrition choices and exercise plan to help achieve weight reduction.   Aim for 60 gm of Protein daily  Drink 60 oz of Water daily  Exercise 60 min " EVERYDAY       Post covid-19 condition, unspecified  Refer to Covid recovery clinic   Orders:    Referral to COVID Recovery Clinic; Future    Heart failure with preserved left ventricular function (HFpEF)  Condition stable based on symptoms and exam.    Continue current medications and follow-up at least yearly.  Followed by cardiology        Positive depression screening  Reviewed screening with pt  Does not want meds at this time  Needs to work on getting outside and being more active  Will monitor       Screening for multiple conditions  Depression screening completed using PHQ-2 questions with results documented in the chart/encounter (15 minutes)  See rooming screening section for documentation and/or progress note for additional information.    Advanced directives, counseling/discussion  I spent > 16 minutes face to face with Jayy Coles discussing his/her advance directives, including a Living Will, Healthcare POA as well as specific end of life choices and/or directives, and pt was provided with packet to return next visit.    HCPOA: Wife   Pt is Full Code    Routine general medical examination at health care facility    Orders:    1 Year Follow Up In Primary Care - Wellness Exam; Future         Patient was identified as a fall risk. Risk prevention instructions provided.    Greater than 25 minutes were spent reviewing specialists notes, imaging and lab results, performing physical exam, medication reconciliation, and collaboration with patients family regarding POC.   Assessment, impression and plan is reflected in the note above as well as the orders.

## 2025-03-10 ENCOUNTER — APPOINTMENT (OUTPATIENT)
Dept: PRIMARY CARE | Facility: CLINIC | Age: 65
End: 2025-03-10
Payer: MEDICARE

## 2025-03-10 VITALS
HEIGHT: 69 IN | HEART RATE: 68 BPM | WEIGHT: 315 LBS | SYSTOLIC BLOOD PRESSURE: 129 MMHG | DIASTOLIC BLOOD PRESSURE: 76 MMHG | BODY MASS INDEX: 46.65 KG/M2 | OXYGEN SATURATION: 93 %

## 2025-03-10 DIAGNOSIS — Z13.31 POSITIVE DEPRESSION SCREENING: ICD-10-CM

## 2025-03-10 DIAGNOSIS — E78.2 MIXED HYPERLIPIDEMIA: ICD-10-CM

## 2025-03-10 DIAGNOSIS — E11.65 TYPE 2 DIABETES MELLITUS WITH HYPERGLYCEMIA, WITH LONG-TERM CURRENT USE OF INSULIN: ICD-10-CM

## 2025-03-10 DIAGNOSIS — Z79.4 TYPE 2 DIABETES MELLITUS WITH HYPERGLYCEMIA, WITH LONG-TERM CURRENT USE OF INSULIN: ICD-10-CM

## 2025-03-10 DIAGNOSIS — Z00.00 ROUTINE GENERAL MEDICAL EXAMINATION AT HEALTH CARE FACILITY: ICD-10-CM

## 2025-03-10 DIAGNOSIS — Z71.89 ADVANCED DIRECTIVES, COUNSELING/DISCUSSION: ICD-10-CM

## 2025-03-10 DIAGNOSIS — Z00.00 MEDICARE ANNUAL WELLNESS VISIT, INITIAL: Primary | ICD-10-CM

## 2025-03-10 DIAGNOSIS — Z13.89 SCREENING FOR MULTIPLE CONDITIONS: ICD-10-CM

## 2025-03-10 DIAGNOSIS — I50.30 HEART FAILURE WITH PRESERVED LEFT VENTRICULAR FUNCTION (HFPEF): ICD-10-CM

## 2025-03-10 DIAGNOSIS — U09.9 POST COVID-19 CONDITION, UNSPECIFIED: ICD-10-CM

## 2025-03-10 PROBLEM — E11.42 TYPE 2 DIABETES MELLITUS WITH DIABETIC POLYNEUROPATHY, WITH LONG-TERM CURRENT USE OF INSULIN: Status: ACTIVE | Noted: 2025-03-10

## 2025-03-10 PROCEDURE — G0439 PPPS, SUBSEQ VISIT: HCPCS | Performed by: NURSE PRACTITIONER

## 2025-03-10 PROCEDURE — 1170F FXNL STATUS ASSESSED: CPT | Performed by: NURSE PRACTITIONER

## 2025-03-10 PROCEDURE — G0447 BEHAVIOR COUNSEL OBESITY 15M: HCPCS | Performed by: NURSE PRACTITIONER

## 2025-03-10 PROCEDURE — 1159F MED LIST DOCD IN RCRD: CPT | Performed by: NURSE PRACTITIONER

## 2025-03-10 PROCEDURE — 3074F SYST BP LT 130 MM HG: CPT | Performed by: NURSE PRACTITIONER

## 2025-03-10 PROCEDURE — 3078F DIAST BP <80 MM HG: CPT | Performed by: NURSE PRACTITIONER

## 2025-03-10 PROCEDURE — G0444 DEPRESSION SCREEN ANNUAL: HCPCS | Performed by: NURSE PRACTITIONER

## 2025-03-10 PROCEDURE — 1158F ADVNC CARE PLAN TLK DOCD: CPT | Performed by: NURSE PRACTITIONER

## 2025-03-10 PROCEDURE — 3008F BODY MASS INDEX DOCD: CPT | Performed by: NURSE PRACTITIONER

## 2025-03-10 PROCEDURE — 99497 ADVNCD CARE PLAN 30 MIN: CPT | Performed by: NURSE PRACTITIONER

## 2025-03-10 PROCEDURE — 1036F TOBACCO NON-USER: CPT | Performed by: NURSE PRACTITIONER

## 2025-03-10 PROCEDURE — 99214 OFFICE O/P EST MOD 30 MIN: CPT | Performed by: NURSE PRACTITIONER

## 2025-03-10 PROCEDURE — 1123F ACP DISCUSS/DSCN MKR DOCD: CPT | Performed by: NURSE PRACTITIONER

## 2025-03-10 PROCEDURE — 1160F RVW MEDS BY RX/DR IN RCRD: CPT | Performed by: NURSE PRACTITIONER

## 2025-03-10 ASSESSMENT — ACTIVITIES OF DAILY LIVING (ADL)
TAKING_MEDICATION: INDEPENDENT
MANAGING_FINANCES: INDEPENDENT
BATHING: INDEPENDENT
DOING_HOUSEWORK: NEEDS ASSISTANCE
DRESSING: NEEDS ASSISTANCE
GROCERY_SHOPPING: NEEDS ASSISTANCE

## 2025-03-10 ASSESSMENT — PATIENT HEALTH QUESTIONNAIRE - PHQ9
1. LITTLE INTEREST OR PLEASURE IN DOING THINGS: SEVERAL DAYS
SUM OF ALL RESPONSES TO PHQ QUESTIONS 1-9: 14
5. POOR APPETITE OR OVEREATING: MORE THAN HALF THE DAYS
2. FEELING DOWN, DEPRESSED OR HOPELESS: MORE THAN HALF THE DAYS
4. FEELING TIRED OR HAVING LITTLE ENERGY: NEARLY EVERY DAY
8. MOVING OR SPEAKING SO SLOWLY THAT OTHER PEOPLE COULD HAVE NOTICED. OR THE OPPOSITE, BEING SO FIGETY OR RESTLESS THAT YOU HAVE BEEN MOVING AROUND A LOT MORE THAN USUAL: SEVERAL DAYS
3. TROUBLE FALLING OR STAYING ASLEEP OR SLEEPING TOO MUCH: NEARLY EVERY DAY
10. IF YOU CHECKED OFF ANY PROBLEMS, HOW DIFFICULT HAVE THESE PROBLEMS MADE IT FOR YOU TO DO YOUR WORK, TAKE CARE OF THINGS AT HOME, OR GET ALONG WITH OTHER PEOPLE: SOMEWHAT DIFFICULT
9. THOUGHTS THAT YOU WOULD BE BETTER OFF DEAD, OR OF HURTING YOURSELF: NOT AT ALL
6. FEELING BAD ABOUT YOURSELF - OR THAT YOU ARE A FAILURE OR HAVE LET YOURSELF OR YOUR FAMILY DOWN: NOT AT ALL
7. TROUBLE CONCENTRATING ON THINGS, SUCH AS READING THE NEWSPAPER OR WATCHING TELEVISION: MORE THAN HALF THE DAYS
SUM OF ALL RESPONSES TO PHQ9 QUESTIONS 1 AND 2: 3

## 2025-03-10 ASSESSMENT — ENCOUNTER SYMPTOMS
LOSS OF SENSATION IN FEET: 1
DEPRESSION: 1
OCCASIONAL FEELINGS OF UNSTEADINESS: 1

## 2025-03-10 NOTE — ASSESSMENT & PLAN NOTE
I spent > 16 minutes face to face with Jayy GISSEL Coles discussing his/her advance directives, including a Living Will, Healthcare POA as well as specific end of life choices and/or directives, and pt was provided with packet to return next visit.    HCPOA: Wife   Pt is Full Code

## 2025-03-10 NOTE — ASSESSMENT & PLAN NOTE
In a face to face session, I informed patient of his/her BMI > 30.  We discussed appropriate nutrition choices and exercise plan to help achieve weight reduction.   Aim for 60 gm of Protein daily  Drink 60 oz of Water daily  Exercise 60 min EVERYDAY

## 2025-03-10 NOTE — ASSESSMENT & PLAN NOTE
Depression screening completed using PHQ-2 questions with results documented in the chart/encounter (15 minutes)  See rooming screening section for documentation and/or progress note for additional information.

## 2025-03-10 NOTE — PATIENT INSTRUCTIONS
Thank you for seeing me today Jayy Coles, it was a pleasure to see you again!    Today we did your Annual Medicare Wellness Exam and discussed the following:     Continue medications as prescribed    See Covid long haulers [facility]     Labs are due, please complete    Please get flu vaccine at your pharmacy     For assistance with scheduling referrals or consultations, please call [phone] or [phone].    For laboratory, radiology, and other tests, please call [phone] ([phone] for pediatrics).   For laboratory locations, please visit [email or web address]   If you do not get results within 7-10 days, or you have any questions or concerns, please send a message, call the office ([phone]), or return to the office for a follow-up appointment.     For acute/sick visits, if you are unable to get an office visit, you can do a  On Demand Virtual Visit that is accessible via your My Chart account.  For emergencies, call 9-1-1 or go to the nearest Emergency Department.     Please schedule additional appointment(s) to address concern(s) not addressed today.    Please review prescription inserts and published information for possible adverse effects of all medications.     In general, results are discussed over the phone or via  [facility].     You can see your health information, review clinical summaries from office visits & test results online when you follow your health with MY  Chart, a personal health record.   To sign up go to [email or web address].   If you need assistance with signing up or trouble getting into your account call [facility] Patient Line 24/7 at [phone]         Ways to Help Prevent Falls at Home    Quick Tips   ? Ask for help if you need it. Most people want to help!   ? Get up slowly after sitting or laying down   ? Wear a medical alert device or keep cell phone in your pocket   ? Use night lights, especially areas near a  bathroom   ? Keep the items you use often within reach on a small stool or end table   ? Use an assistive device such as walker or cane, as directed by provider/physical therapy   ? Use a non-slip mat and grab bars in your bathroom. Look for home health sections for best options     Other Areas to Focus On   ? Exercise and nutrition: Regular exercise or taking a falls prevention class are great ways improve strength and balance. Don’t forget to stay hydrated and bring a snack!   ? Medicine side effects: Some medicines can make you sleepy or dizzy, which could cause a fall. Ask your healthcare provider about the side effects your medicines could cause. Be sure to let them know if you take any vitamins or supplements as well.   ? Tripping hazards: Remove items you could trip on, such as loose mats, rugs, cords, and clutter. Wear closed toe shoes with rubber soles.   ? Health and wellness: Get regular checkups with your healthcare provider, plus routine vision and hearing screenings. Talk with your healthcare provider about:   o Your medicines and the possible side effects - bring them in a bag if that is easier!   o Problems with balance or feeling dizzy   o Ways to promote bone health, such as Vitamin D and calcium supplements   o Questions or concerns about falling     *Ask your healthcare team if you have questions     [facility], 2022         RTC 6 MONTHS AND AS NEEDED     ~Payal Valerio NP

## 2025-03-10 NOTE — ASSESSMENT & PLAN NOTE
Condition stable based on symptoms and exam.    Continue current medications and follow-up at least yearly.  Followed by Dr. Luis Duggan   FBW is due   Orders:    Follow Up In Primary Care - Established; Future

## 2025-03-10 NOTE — ASSESSMENT & PLAN NOTE
Condition stable based on symptoms and exam.    Continue current medications and follow-up at least yearly.  Followed by cardiology

## 2025-03-10 NOTE — ASSESSMENT & PLAN NOTE
Reviewed screening with pt  Does not want meds at this time  Needs to work on getting outside and being more active  Will monitor

## 2025-03-11 ENCOUNTER — OFFICE VISIT (OUTPATIENT)
Dept: PAIN MEDICINE | Facility: CLINIC | Age: 65
End: 2025-03-11
Payer: MEDICARE

## 2025-03-11 VITALS
RESPIRATION RATE: 18 BRPM | OXYGEN SATURATION: 90 % | HEART RATE: 72 BPM | DIASTOLIC BLOOD PRESSURE: 72 MMHG | SYSTOLIC BLOOD PRESSURE: 120 MMHG

## 2025-03-11 DIAGNOSIS — M17.0 PRIMARY OSTEOARTHRITIS OF BOTH KNEES: ICD-10-CM

## 2025-03-11 PROCEDURE — 99213 OFFICE O/P EST LOW 20 MIN: CPT | Performed by: ANESTHESIOLOGY

## 2025-03-11 PROCEDURE — 1125F AMNT PAIN NOTED PAIN PRSNT: CPT | Performed by: ANESTHESIOLOGY

## 2025-03-11 PROCEDURE — 3078F DIAST BP <80 MM HG: CPT | Performed by: ANESTHESIOLOGY

## 2025-03-11 PROCEDURE — 1123F ACP DISCUSS/DSCN MKR DOCD: CPT | Performed by: ANESTHESIOLOGY

## 2025-03-11 PROCEDURE — 3074F SYST BP LT 130 MM HG: CPT | Performed by: ANESTHESIOLOGY

## 2025-03-11 RX ORDER — BACLOFEN 10 MG/1
10 TABLET ORAL 3 TIMES DAILY
Qty: 90 TABLET | Refills: 2 | Status: SHIPPED | OUTPATIENT
Start: 2025-03-11 | End: 2026-03-06

## 2025-03-11 RX ORDER — OXYCODONE HYDROCHLORIDE 10 MG/1
10 TABLET ORAL 4 TIMES DAILY PRN
Qty: 112 TABLET | Refills: 0 | Status: SHIPPED | OUTPATIENT
Start: 2025-04-25 | End: 2025-05-23

## 2025-03-11 RX ORDER — OXYCODONE HYDROCHLORIDE 10 MG/1
10 TABLET ORAL 4 TIMES DAILY PRN
Qty: 112 TABLET | Refills: 0 | Status: SHIPPED | OUTPATIENT
Start: 2025-05-23 | End: 2025-06-20

## 2025-03-11 RX ORDER — PREGABALIN 150 MG/1
150 CAPSULE ORAL 3 TIMES DAILY
Qty: 90 CAPSULE | Refills: 2 | Status: SHIPPED | OUTPATIENT
Start: 2025-03-17 | End: 2025-06-15

## 2025-03-11 RX ORDER — OXYCODONE HYDROCHLORIDE 10 MG/1
10 TABLET ORAL 4 TIMES DAILY PRN
Qty: 112 TABLET | Refills: 0 | Status: SHIPPED | OUTPATIENT
Start: 2025-03-28 | End: 2025-04-25

## 2025-03-11 ASSESSMENT — PAIN - FUNCTIONAL ASSESSMENT: PAIN_FUNCTIONAL_ASSESSMENT: 0-10

## 2025-03-11 ASSESSMENT — LIFESTYLE VARIABLES: TOTAL SCORE: 0

## 2025-03-11 ASSESSMENT — PAIN SCALES - GENERAL: PAINLEVEL_OUTOF10: 4

## 2025-03-11 ASSESSMENT — PAIN DESCRIPTION - DESCRIPTORS: DESCRIPTORS: DULL;ACHING;THROBBING

## 2025-03-11 NOTE — PROGRESS NOTES
"Subjective   Patient ID: Jayy Coles is a 64 y.o. male presenting with complaint of bilateral knee pain.  Patient is here today primarily for pain medication refill.       HPI:    Jayy Coles is a 64 y.o. male presenting with complaint of bilateral knee pain.  Patient is here today primarily for pain medication refill.  Patient's pain is mainly controlled by immediate release oxycodone 10 mg 4 times daily, pregabalin 150 mg 3 times daily, baclofen 10 mg 3 times daily.    Physical Therapy: {Physical therapy pain HPI:51698}  Other Conservative Measures he has tried: {pain-other modalities tried in the past:70524}  Classes of medications tried in the past: {Pain meds--medication classes tried in past:82775::\"Acetaminophen\",\"NSAIDs\"}    ***Add \".jwoarrs\" otherwise delete if no Narcotics  Last Urine Drug Screen:  No results found for this or any previous visit (from the past 8760 hours).  {ResultsAsExpected:02972}      Review of Systems   13-point ROS done and negative except for HPI.     Current Outpatient Medications   Medication Instructions   • albuterol 90 mcg/actuation inhaler 2 puffs, Every 4 hours PRN   • baclofen (LIORESAL) 10 mg, oral, 3 times daily   • blood-glucose sensor (FreeStyle Casandra 3 Sensor) device Apply one sensor to the skin every 14 days to test blood sugars   • cephalexin (KEFLEX) 500 mg, oral, 2 times daily   • empagliflozin (JARDIANCE) 25 mg, oral, Daily   • FreeStyle glucose monitoring (FreeStyle Lite Meter) kit 1 each   • FreeStyle Casandra 3 [place] misc Use as instructed to check blood sugar   • FreeStyle Lite Strips strip 4 times daily   • furosemide (LASIX) 80 mg, oral, 2 times daily   • HumaLOG KwikPen Insulin 100 unit/mL injection Use as directed with meals up to 110 units a day disp 105 ml 90 day supply   • insulin glargine (Toujeo Max U-300 SoloStar) 300 unit/mL (3 mL) injection Up to 100 units daily, Take as directed per insulin instructions.   • metoprolol tartrate (LOPRESSOR) 25 " "mg, oral, 2 times daily   • Mounjaro 5 mg, subcutaneous, Every 7 days   • Mounjaro 7.5 mg, subcutaneous, Once Weekly   • Mounjaro 10 mg, subcutaneous, Once Weekly   • naloxone (NARCAN) 4 mg, nasal, As needed   • nystatin (Mycostatin) cream APPLY AND RUB IN A THIN LAYER TOPICALLY TO THE AFFECTED AREA TWICE DAILY IN THE MORNING AND IN THE EVENING   • oxyCODONE (ROXICODONE) 10 mg, oral, 4 times daily PRN   • oxyCODONE (ROXICODONE) 10 mg, oral, 4 times daily PRN   • oxyCODONE (ROXICODONE) 10 mg, oral, 4 times daily PRN   • oxyCODONE (ROXICODONE) 10 mg, oral, Every 6 hours PRN   • pen needle, diabetic (BD Ultra-Fine Short Pen Needle) 31 gauge x 5/16\" needle Use as directed   • pen needle, diabetic 32 gauge x 5/32\" needle Use 4 times per day   • pregabalin (LYRICA) 150 mg, oral, 3 times daily   • rosuvastatin (CRESTOR) 10 mg, oral, Daily   • Symbicort 160-4.5 mcg/actuation inhaler 2 puffs, inhalation, 2 times daily       Past Medical History:   Diagnosis Date   • Candidiasis of skin and nail 10/01/2018    Cutaneous candidiasis   • CHF (congestive heart failure)    • Contact with and (suspected) exposure to covid-19 09/28/2021    Close exposure to COVID-19 virus   • Duodenitis without bleeding 02/23/2016    Duodenitis   • Encounter for other preprocedural examination 02/25/2021    Preoperative clearance   • Hyperlipidemia    • Hypertension    • Hypothyroidism    • Infection and inflammatory reaction due to other internal joint prosthesis, initial encounter (CMS-HCC) 01/28/2021    Infection of total knee replacement   • Laceration without foreign body, right lower leg, subsequent encounter 06/11/2018    Leg laceration, right, subsequent encounter   • Mixed hyperlipidemia    • Other specified complication of vascular prosthetic devices, implants and grafts, initial encounter (CMS-HCC) 12/22/2020    PICC line infiltration   • Personal history of other diseases of the circulatory system 12/15/2022    History of atrial " "fibrillation   • Personal history of other endocrine, nutritional and metabolic disease 04/30/2015    History of diabetes mellitus   • Personal history of other malignant neoplasm of large intestine     History of malignant neoplasm of colon   • Personal history of other malignant neoplasm of stomach     History of malignant neoplasm of stomach   • Personal history of other specified conditions 10/01/2018    History of dysuria   • Personal history of other specified conditions 04/30/2015    History of edema   • Primary hypertension    • Type 2 diabetes mellitus with diabetic polyneuropathy    • Type 2 diabetes mellitus with foot ulcer (CODE) 12/03/2021    Diabetic ulcer of toe of left foot associated with type 2 diabetes mellitus, limited to breakdown of skin   • Type 2 diabetes mellitus with hyperglycemia, with long-term current use of insulin    • Unspecified abdominal pain 07/07/2021    Abdominal cramping        Past Surgical History:   Procedure Laterality Date   • KNEE SURGERY  04/30/2015    Knee Surgery   • OTHER SURGICAL HISTORY  04/30/2015    Arthrotomy Of Knee With Medial Meniscectomy   • OTHER SURGICAL HISTORY  01/29/2021    Colon surgery   • TOTAL KNEE ARTHROPLASTY  04/30/2015    Total Knee Arthroplasty        Family History   Problem Relation Name Age of Onset   • Stroke Mother     • Dementia Mother     • Pneumonia Mother     • Heart disease Father     • Diabetes Father     • Cancer Father     • Arthritis Father     • Skin cancer Father     • Melanoma Father     • Gout Father     • Heart attack Father     • Other (Cardiac disorder) Father          Allergies   Allergen Reactions   • Topiramate Shortness of breath   • Terbinafine Unknown and Swelling   • Penicillins Rash and Other     \"red and hot\"        Objective     Vitals:    03/11/25 1131   BP: 120/72   Pulse: 72   Resp: 18   SpO2: 90%        Physical Exam  General: NAD, well groomed, well nourished  Eyes: Non-icteric sclera, EOMI  Ears, Nose, Mouth, " "and Throat: External ears and nose appear to be without deformity or rash. No lesions or masses noted. Hearing is grossly intact.   Neck: Trachea midline  Respiratory: Nonlabored breathing   Cardiovascular: {POD +3, +2, +1:56609} peripheral edema   Skin: No rashes or open lesions/ulcers identified on skin.    Back:   Palpation: ***No tenderness to palpation over lumbar paraspinous muscles.   Straight leg raise: {Pain exam SLR (Optional):01429}  KARTHIK Maneuver {DOES/DOES NOT:94715} reproduce pain {DESC; ON THE RIGHT/ON THE LEFT/BILAT:07799}    ***Hip: {Pain hip exam (Optional):37207::\"No pain over greater trochanters.\",\"Pain not reproduced with hip internal/external rotation. \"}    Neurologic:   Cranial nerves grossly intact.   Strength ***5/5 and symmetric plantar/dorsiflexion   Sensation: Normal to light touch throughout, ***pinprick *** intact throughout.  DTRs:{NEURO REFLEXES:17884}  Cheema: {absent/present:51455}  Clonus: {absent/present:78641}    Psychiatric: Alert, orientation to person, place, and time. Cooperative.    Imaging personally reviewed and independently interpreted: ***    Assessment/Plan   ***    Plan:  -***    {The patient has failed treatment with (Optional):34993}    {Pain Risks nd benefits discussed.:24388::\"We discussed  the risks, benefits and alternatives of the procedure including but not limited to: \",\"Lack of efficacy \",\"Transiently worsening pain \",\"Bleeding\",\"Infection \",\"Nerve Damage\"}    Follow up: {Pain follow up_when:03141}    The patient was invited to contact us back anytime with any questions or concerns and follow-up with us in the office as needed.     Diagnoses and all orders for this visit:  Primary osteoarthritis of both knees      This note was generated with the aid of dictation software, there may be typos despite my attempts at proofreading.    " Detail Level: Detailed

## 2025-03-11 NOTE — PROGRESS NOTES
Subjective   Patient ID: Jayy Coles is a 64 y.o. male presenting with complaint of bilateral knee pain.  Patient is here today primarily for pain medication refill.       HPI:   Jayy Coles is a 64 y.o. male presenting with complaint of bilateral knee pain.  Patient is here today primarily for pain medication refill.  Patient's pain is mainly controlled by immediate release oxycodone 10 mg 4 times daily, pregabalin 150 mg 3 times daily, baclofen 10 mg 3 times daily.  Patient says his pain is about 4 out of 10 in intensity when he takes his medications, and that this is a satisfactory level for him.  Patient was reportedly told that it is not recommended that he have any invasive procedures done on his bilateral lower extremities because of potential infection risk.    Review of Systems   13-point ROS done and negative except for HPI.     Current Outpatient Medications   Medication Instructions    albuterol 90 mcg/actuation inhaler 2 puffs, Every 4 hours PRN    baclofen (LIORESAL) 10 mg, oral, 3 times daily    blood-glucose sensor (FreeStyle Casandra 3 Sensor) device Apply one sensor to the skin every 14 days to test blood sugars    cephalexin (KEFLEX) 500 mg, oral, 2 times daily    empagliflozin (JARDIANCE) 25 mg, oral, Daily    FreeStyle glucose monitoring (FreeStyle Lite Meter) kit 1 each    FreeStyle Casandra 3 [place] misc Use as instructed to check blood sugar    FreeStyle Lite Strips strip 4 times daily    furosemide (LASIX) 80 mg, oral, 2 times daily    HumaLOG KwikPen Insulin 100 unit/mL injection Use as directed with meals up to 110 units a day disp 105 ml 90 day supply    insulin glargine (Toujeo Max U-300 SoloStar) 300 unit/mL (3 mL) injection Up to 100 units daily, Take as directed per insulin instructions.    metoprolol tartrate (LOPRESSOR) 25 mg, oral, 2 times daily    Mounjaro 5 mg, subcutaneous, Every 7 days    Mounjaro 7.5 mg, subcutaneous, Once Weekly    Mounjaro 10 mg, subcutaneous, Once Weekly     "naloxone (NARCAN) 4 mg, nasal, As needed    nystatin (Mycostatin) cream APPLY AND RUB IN A THIN LAYER TOPICALLY TO THE AFFECTED AREA TWICE DAILY IN THE MORNING AND IN THE EVENING    oxyCODONE (ROXICODONE) 10 mg, oral, Every 6 hours PRN    [START ON 3/28/2025] oxyCODONE (ROXICODONE) 10 mg, oral, 4 times daily PRN    [START ON 4/25/2025] oxyCODONE (ROXICODONE) 10 mg, oral, 4 times daily PRN    [START ON 5/23/2025] oxyCODONE (ROXICODONE) 10 mg, oral, 4 times daily PRN    pen needle, diabetic (BD Ultra-Fine Short Pen Needle) 31 gauge x 5/16\" needle Use as directed    pen needle, diabetic 32 gauge x 5/32\" needle Use 4 times per day    [START ON 3/17/2025] pregabalin (LYRICA) 150 mg, oral, 3 times daily    rosuvastatin (CRESTOR) 10 mg, oral, Daily    Symbicort 160-4.5 mcg/actuation inhaler 2 puffs, inhalation, 2 times daily       Past Medical History:   Diagnosis Date    Candidiasis of skin and nail 10/01/2018    Cutaneous candidiasis    CHF (congestive heart failure)     Contact with and (suspected) exposure to covid-19 09/28/2021    Close exposure to COVID-19 virus    Duodenitis without bleeding 02/23/2016    Duodenitis    Encounter for other preprocedural examination 02/25/2021    Preoperative clearance    Hyperlipidemia     Hypertension     Hypothyroidism     Infection and inflammatory reaction due to other internal joint prosthesis, initial encounter (CMS-HCC) 01/28/2021    Infection of total knee replacement    Laceration without foreign body, right lower leg, subsequent encounter 06/11/2018    Leg laceration, right, subsequent encounter    Mixed hyperlipidemia     Other specified complication of vascular prosthetic devices, implants and grafts, initial encounter (CMS-HCC) 12/22/2020    PICC line infiltration    Personal history of other diseases of the circulatory system 12/15/2022    History of atrial fibrillation    Personal history of other endocrine, nutritional and metabolic disease 04/30/2015    History of " "diabetes mellitus    Personal history of other malignant neoplasm of large intestine     History of malignant neoplasm of colon    Personal history of other malignant neoplasm of stomach     History of malignant neoplasm of stomach    Personal history of other specified conditions 10/01/2018    History of dysuria    Personal history of other specified conditions 04/30/2015    History of edema    Primary hypertension     Type 2 diabetes mellitus with diabetic polyneuropathy     Type 2 diabetes mellitus with foot ulcer (CODE) 12/03/2021    Diabetic ulcer of toe of left foot associated with type 2 diabetes mellitus, limited to breakdown of skin    Type 2 diabetes mellitus with hyperglycemia, with long-term current use of insulin     Unspecified abdominal pain 07/07/2021    Abdominal cramping        Past Surgical History:   Procedure Laterality Date    KNEE SURGERY  04/30/2015    Knee Surgery    OTHER SURGICAL HISTORY  04/30/2015    Arthrotomy Of Knee With Medial Meniscectomy    OTHER SURGICAL HISTORY  01/29/2021    Colon surgery    TOTAL KNEE ARTHROPLASTY  04/30/2015    Total Knee Arthroplasty        Family History   Problem Relation Name Age of Onset    Stroke Mother      Dementia Mother      Pneumonia Mother      Heart disease Father      Diabetes Father      Cancer Father      Arthritis Father      Skin cancer Father      Melanoma Father      Gout Father      Heart attack Father      Other (Cardiac disorder) Father          Allergies   Allergen Reactions    Topiramate Shortness of breath    Terbinafine Unknown and Swelling    Penicillins Rash and Other     \"red and hot\"        Objective     Vitals:    03/11/25 1131   BP: 120/72   Pulse: 72   Resp: 18   SpO2: 90%        Physical Exam  General: NAD, well groomed, well nourished  Eyes: Non-icteric sclera, EOMI  Ears, Nose, Mouth, and Throat: External ears and nose appear to be without deformity or rash. No lesions or masses noted. Hearing is grossly intact.   Neck: " Trachea midline  Respiratory: Nonlabored breathing   Cardiovascular: No noted significant abnormal peripheral edema   Skin: No rashes or open lesions/ulcers identified on skin.    Assessment/Plan   Jayy Coles is a 64 y.o. male presenting with complaint of bilateral knee pain.  Patient is here today primarily for pain medication refill.     PDMP score: 180  Avg MME/day: 52    Plan:  -Patient's pain medications to be refilled     Follow up: As needed     The patient was invited to contact us back anytime with any questions or concerns and follow-up with us in the office as needed.     Diagnoses and all orders for this visit:  Primary osteoarthritis of both knees  -     baclofen (Lioresal) 10 mg tablet; Take 1 tablet (10 mg) by mouth 3 times a day.  -     oxyCODONE (Roxicodone) 10 mg immediate release tablet; Take 1 tablet (10 mg) by mouth 4 times a day as needed for severe pain (7 - 10) for up to 28 days. Do not fill before March 28, 2025.  -     oxyCODONE (Roxicodone) 10 mg immediate release tablet; Take 1 tablet (10 mg) by mouth 4 times a day as needed for severe pain (7 - 10) for up to 28 days. Do not fill before April 25, 2025.  -     oxyCODONE (Roxicodone) 10 mg immediate release tablet; Take 1 tablet (10 mg) by mouth 4 times a day as needed for severe pain (7 - 10) for up to 28 days. Do not fill before May 23, 2025.  -     pregabalin (Lyrica) 150 mg capsule; Take 1 capsule (150 mg) by mouth 3 times a day. Do not fill before March 17, 2025.      This note was generated with the aid of dictation software, there may be typos despite my attempts at proofreading.

## 2025-03-17 SDOH — ECONOMIC STABILITY: FOOD INSECURITY: WITHIN THE PAST 12 MONTHS, THE FOOD YOU BOUGHT JUST DIDN'T LAST AND YOU DIDN'T HAVE MONEY TO GET MORE.: NEVER TRUE

## 2025-03-17 SDOH — ECONOMIC STABILITY: FOOD INSECURITY: WITHIN THE PAST 12 MONTHS, YOU WORRIED THAT YOUR FOOD WOULD RUN OUT BEFORE YOU GOT MONEY TO BUY MORE.: NEVER TRUE

## 2025-03-17 SDOH — ECONOMIC STABILITY: INCOME INSECURITY: IN THE LAST 12 MONTHS, WAS THERE A TIME WHEN YOU WERE NOT ABLE TO PAY THE MORTGAGE OR RENT ON TIME?: NO

## 2025-03-17 SDOH — ECONOMIC STABILITY: INCOME INSECURITY: HOW HARD IS IT FOR YOU TO PAY FOR THE VERY BASICS LIKE FOOD, HOUSING, MEDICAL CARE, AND HEATING?: NOT VERY HARD

## 2025-03-17 SDOH — ECONOMIC STABILITY: TRANSPORTATION INSECURITY
IN THE PAST 12 MONTHS, HAS LACK OF TRANSPORTATION KEPT YOU FROM MEETINGS, WORK, OR FROM GETTING THINGS NEEDED FOR DAILY LIVING?: NO

## 2025-03-17 SDOH — ECONOMIC STABILITY: TRANSPORTATION INSECURITY
IN THE PAST 12 MONTHS, HAS THE LACK OF TRANSPORTATION KEPT YOU FROM MEDICAL APPOINTMENTS OR FROM GETTING MEDICATIONS?: NO

## 2025-03-17 ASSESSMENT — SLEEP AND FATIGUE QUESTIONNAIRES
FATIGUE_INTERFERES_PHYSICAL_FUNCTIONING: 7 STRONGLY AGREE
FATIGUE_MOST_DISABILING_SYMPTOM: 7 STRONGLY AGREE
FATIGUE_INTERFERES_RESPONSIBILITIES: 7 STRONGLY AGREE
MY FATIGUE PREVENTS SUSTAINED PHYSICAL FUNCTIONING.: 7 STRONGLY AGREE
MY MOTIVATION IS LOWER WHEN I AM FATIGUED.: 7 STRONGLY AGREE
EXERCISE_BRINGS_ON_FATIGUE: 7 STRONGLY AGREE
MY FATIGUE PREVENTS SUSTAINED PHYSICAL FUNCTIONING.: 7 STRONGLY AGREE
FATIGUE_MOST_DISABILING_SYMPTOM: 7 STRONGLY AGREE
FATIGUE_CAUSES_FREQUENT_PROBLEMTS: 7 STRONGLY AGREE
AVERAGE_FSS_SCORE: 7
MY MOTIVATION IS LOWER WHEN I AM FATIGUED.: 7 STRONGLY AGREE
EASILY_FATIGUED: 7 STRONGLY AGREE
FATIGUE_INTERFERES_SOCIAL_LIFE: 7 STRONGLY AGREE
FATIGUE_INTERFERES_PHYSICAL_FUNCTIONING: 7 STRONGLY AGREE
EXERCISE_BRINGS_ON_FATIGUE: 7 STRONGLY AGREE
VISUAL ANALOGUE FATIGUE SCALE (VAFS): 0
FATIGUE_CAUSES_FREQUENT_PROBLEMTS: 7 STRONGLY AGREE
FATIGUE_INTERFERES_SOCIAL_LIFE: 7 STRONGLY AGREE
EASILY_FATIGUED: 7 STRONGLY AGREE
FATIGUE_INTERFERES_RESPONSIBILITIES: 7 STRONGLY AGREE

## 2025-03-17 ASSESSMENT — ANXIETY QUESTIONNAIRES
2. NOT BEING ABLE TO STOP OR CONTROL WORRYING: NEARLY EVERY DAY
2. NOT BEING ABLE TO STOP OR CONTROL WORRYING: NEARLY EVERY DAY
4. TROUBLE RELAXING: NEARLY EVERY DAY
1. FEELING NERVOUS, ANXIOUS, OR ON EDGE: MORE THAN HALF THE DAYS
IF YOU CHECKED OFF ANY PROBLEMS ON THIS QUESTIONNAIRE, HOW DIFFICULT HAVE THESE PROBLEMS MADE IT FOR YOU TO DO YOUR WORK, TAKE CARE OF THINGS AT HOME, OR GET ALONG WITH OTHER PEOPLE: VERY DIFFICULT
1. FEELING NERVOUS, ANXIOUS, OR ON EDGE: MORE THAN HALF THE DAYS
GAD7 TOTAL SCORE: 18
3. WORRYING TOO MUCH ABOUT DIFFERENT THINGS: NEARLY EVERY DAY
4. TROUBLE RELAXING: NEARLY EVERY DAY
6. BECOMING EASILY ANNOYED OR IRRITABLE: MORE THAN HALF THE DAYS
7. FEELING AFRAID AS IF SOMETHING AWFUL MIGHT HAPPEN: NEARLY EVERY DAY
5. BEING SO RESTLESS THAT IT IS HARD TO SIT STILL: MORE THAN HALF THE DAYS
5. BEING SO RESTLESS THAT IT IS HARD TO SIT STILL: MORE THAN HALF THE DAYS
3. WORRYING TOO MUCH ABOUT DIFFERENT THINGS: NEARLY EVERY DAY
IF YOU CHECKED OFF ANY PROBLEMS ON THIS QUESTIONNAIRE, HOW DIFFICULT HAVE THESE PROBLEMS MADE IT FOR YOU TO DO YOUR WORK, TAKE CARE OF THINGS AT HOME, OR GET ALONG WITH OTHER PEOPLE: VERY DIFFICULT
7. FEELING AFRAID AS IF SOMETHING AWFUL MIGHT HAPPEN: NEARLY EVERY DAY
6. BECOMING EASILY ANNOYED OR IRRITABLE: MORE THAN HALF THE DAYS

## 2025-03-17 ASSESSMENT — SOCIAL DETERMINANTS OF HEALTH (SDOH)
WITHIN THE LAST YEAR, HAVE YOU BEEN AFRAID OF YOUR PARTNER OR EX-PARTNER?: NO
WITHIN THE LAST YEAR, HAVE YOU BEEN HUMILIATED OR EMOTIONALLY ABUSED IN OTHER WAYS BY YOUR PARTNER OR EX-PARTNER?: PATIENT DECLINED
WITHIN THE LAST YEAR, HAVE TO BEEN RAPED OR FORCED TO HAVE ANY KIND OF SEXUAL ACTIVITY BY YOUR PARTNER OR EX-PARTNER?: NO
WITHIN THE LAST YEAR, HAVE YOU BEEN KICKED, HIT, SLAPPED, OR OTHERWISE PHYSICALLY HURT BY YOUR PARTNER OR EX-PARTNER?: NO

## 2025-03-17 ASSESSMENT — LIFESTYLE VARIABLES
AUDIT-C TOTAL SCORE: 0
HOW OFTEN DO YOU HAVE SIX OR MORE DRINKS ON ONE OCCASION: NEVER
HOW OFTEN DO YOU HAVE A DRINK CONTAINING ALCOHOL: NEVER
DO_YOU_DRINK?: NO DIFFERENCE
USE_ALCOHOL_TO_HELP_SLEEP: NO
SKIP TO QUESTIONS 9-10: 1
HOW MANY STANDARD DRINKS CONTAINING ALCOHOL DO YOU HAVE ON A TYPICAL DAY: PATIENT DOES NOT DRINK

## 2025-03-19 ENCOUNTER — APPOINTMENT (OUTPATIENT)
Dept: PRIMARY CARE | Facility: CLINIC | Age: 65
End: 2025-03-19
Payer: MEDICARE

## 2025-03-24 NOTE — PROGRESS NOTES
NPV Virtual The virtual visit conducted with Audio and Video.    Verbal consent was given for the following virtual visit, patient is currently located in [place]. All issues discussed and addressed below were done so without a physical examination. If it was felt the patient needed be seen in clinic in person they were directed there.    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 Valerio, Ortho Dr. Mosquera, Pain Management Dr. Valdez, Endocrinology Dr. Beltran, Pulmonary Dr. Vásquez, Cardiology Dr. Thomson, Neurology Dr. Brunner    Survey Scores: 03/2025  PHQ-9: 15   ERICK-7: 18   Sleep Wellness: 4   FSS average: 7   Modified Ecog average: 1.5   MOCA: 18/22 (03/2025)  Overall Health: 20     64 y.o. male with a h/o COVID-19 as noted above, DM2, anemia, ascending arotic aneurysm, asthma, CAD, GERD, HFpEF, Afib, colon ca, obesity, hypothyroidism, chronic tension headaches, RICCI, presents [facility] with c/o shortness of breath, poor sleep, chronic pain, brain fog, fatigue, headaches, smell and taste changes, rhinitis, dysphagia, constipation, numbness and tingling, mood changes, edema, dizziness, hair loss    First COVID illness was severe, no recollection of the first 5 days admitted  He has been struggling since then  Seems to be getting worse  Hair is falling out  Breathing has been terrible, he has spots on right lung, on supplemental  oxygen  SOB when walking even across the house  Following closely with Pulmonary, on 3L, wearing this when he feels SOB, no cough  Sometimes SOB at rest such as laying in the bed on one side or too reclined  Not sleeping well, that is at baseline for the past 20 years, not getting more than 3 hours of sleep at night  Ends up sleeping during the day because he is so tired  Cannot sleep with CPAP machine, mask is restrictive, went to classes at [employer] but that was not helpful  Has tried different masks without success either  Taking Lasix twice daily, wife is helping with medication compliance  No chest pain, no heart racing, blood pressures have been very good  Brain fog, forgetful, even forgot wife's name one day, word finding difficulties, difficulty with paying attention to TV shows  Difficulty with knees, feet and knees with significant pain, has had 7 operations on his left knee, right knee now major problem  Body aches more so in general since COVID illness, that is new  Hx of colon cancer, stomach cramping   Mass on back of brain  Terrible headaches since COVID, both sides of his head, advil and pressure helps sometimes, has not tried massage therapy  Headaches come and go, probably 3-5 times per week, sometimes they last the whole day  No vision changes, vision exam was stable  No hearing changes, no tinnitus  Most of the time has taste changes, food is tasting terribly  Working on weight loss, gained 75lbs since COVID, now on Mounjaro and that has helped him lose some weight  Watching what he eats, cannot exercise, cannot walk, had to get a wheelchair  Smell is like sulphur or aluminum  Has a runny nose nose, dripping, relates to one of his medications  No mouth sores, getting dry mouth talking, wakes up with dry mouth, no problems with dry eyes  Dysphagia, throat clogged up, has to get water and force it down, but even with liquids has difficulty swallowing, has not had that evaluated  No  acid reflux, no nausea or vomiting  In the past had terrible stomach cramping and vomiting, not the past 6-8 months  Hx of colon ca, constipated for which he takes medication that works intermittently, hard stools  For constipation, is taking stool softener as needed, hx of IBS-D, no longer taking colestipol  No bladder problems  Numbness and tingling in feet and fingers, fingers are new, for feet is taking pregabalin that is new  Last fall tripping over O2 cord, falling frequently, some of that is due to his knee  Struggling to walk due to his knee, has wheelchair and walker, using O2 when he is walking  Did pulmonary rehab two years ago, knee had him stop this  To get out of the house, he is mowing with riding mower at [employer] a couple times per week  Feels that he is lacking motivation  Trying to get healthy for his grand children, one is helping him with the lawn care  Feels depressed, never had in his whole life, some anxiety as well  Very few moments in his life that he is not in pain  Terrible swelling in his feet, icing them at night, in the morning swelling is gone but in the afternoon feet are swollen again  Wearing compression socks every day, to knee  Watching protein and salt intake, taking probiotics for his stomach  No rashes or sores, jock itch sometimes, cream itch works  No abnormal bleeding or bruising  Gets dizzy when he stands up after laying down for a little while  Has not tried aquatherapy since knee surgery  Has a pool but cannot go in it because of shortness of breath  Fatigue is terrible  Recently was given a prescription for a brace, hoping that will help with standing    full ROS completed and all negative unless noted in HPI     Relevant prior healthcare visits:  -03/2025 Pain Management for OA of both knees, refilled baclofen and oxycodone  -03/2025 Ortho knee injection  -02/2025 Endocrinology for DM2 management, increased mounjaro dose  -11/2024 Pulmonary continued symbicort for  asthma, continue supplemental oxygen, notes there is also a cardiac component, post-COVID lung changes are stable, will try nasal mask for severe RICCI due to burping  -07/2024 cardiology notes patient appears near compensated, weight decreasing with Ozempic, continue current medications including Lasix 100mg twice daily  -05/2024 Neurology recommends to try massage therapy for tension-type headache, MRI changes likely have nothing to do with headaches, MRI changes are stable, ? Neurosurgery consult, stop topiramate given side effects    Relevant prior diagnostic studies:  -10/2024 6MWT shows O2 yariel 93%, SBP dropped 20 points, walked 28% predicted with walker  -10/2024 PFTs shows moderately severe restriction  -05/2024 MRI brain shows mild degree of nonspecific white matter signal compatible with microangiopathy or sequela of migraine headaches, 2.5x1.5cm heterogeneous lesion within the suprasellar cistern corresponding to densely calcified lesion on prior CT 2010, given stability this is compatible with non aggressive lesion  -02/2024 CT chest shows mosaic ground-glass pattern in both lungs that is unchanged from 2022, moderate coronary calcifications  -10/2022 CT abdomen/pelvis shows no acute intra-abdominopelvic process  -10/2022 echocardiogram shows LVEF 55-60%, impaired relaxation pattern of LV diastolic filling    Relevant prior laboratory values, unremarkable unless noted:  -02/2025 HbA1c 8%  -08/2023 T4, C-peptide, TSH elevated, CBC/D, BNP, Mag, CMP    Exercise routine:  Diet:  Weight hx: pre-COVID-19 (Patient-Rptd) 275  lbs -> post-COVID-19 (Patient-Rptd) 307  lbs  Substance use: no tobacco use hx, 0 servings ETOH   Social:       Current Outpatient Medications:     albuterol 90 mcg/actuation inhaler, 2 puffs every 4 hours if needed for shortness of breath or wheezing (cough)., Disp: , Rfl:     baclofen (Lioresal) 10 mg tablet, Take 1 tablet (10 mg) by mouth 3 times a day., Disp: 90 tablet, Rfl: 2     blood-glucose sensor (FreeStyle Casandra 3 Sensor) device, Apply one sensor to the skin every 14 days to test blood sugars, Disp: 2 each, Rfl: 11    cephalexin (Keflex) 500 mg capsule, Take 1 capsule (500 mg) by mouth 2 times a day., Disp: 90 capsule, Rfl: 3    empagliflozin (Jardiance) 25 mg, Take 1 tablet (25 mg) by mouth once daily., Disp: 90 tablet, Rfl: 3    FreeStyle glucose monitoring (FreeStyle Lite Meter) kit, 1 each., Disp: , Rfl:     FreeStyle Casandra 3 [place] misc, Use as instructed to check blood sugar, Disp: 1 each, Rfl: 0    FreeStyle Lite Strips strip, 4 times a day., Disp: , Rfl:     furosemide (Lasix) 80 mg tablet, Take 1 tablet (80 mg) by mouth 2 times a day., Disp: 180 tablet, Rfl: 3    HumaLOG KwikPen Insulin 100 unit/mL injection, Use as directed with meals up to 110 units a day disp 105 ml 90 day supply, Disp: 105 mL, Rfl: 3    insulin glargine (Toujeo Max U-300 SoloStar) 300 unit/mL (3 mL) injection, Up to 100 units daily, Take as directed per insulin instructions., Disp: 100 mL, Rfl: 3    metoprolol tartrate (Lopressor) 25 mg tablet, Take 1 tablet (25 mg) by mouth 2 times a day., Disp: 180 tablet, Rfl: 3    Mounjaro 5 mg/0.5 mL pen injector, Inject 5 mg under the skin every 7 days., Disp: 6 mL, Rfl: 1    naloxone (Narcan) 4 mg/0.1 mL nasal spray, Administer 1 spray (4 mg) into affected nostril(s) if needed for opioid reversal or respiratory depression., Disp: 2 each, Rfl: 0    nystatin (Mycostatin) cream, APPLY AND RUB IN A THIN LAYER TOPICALLY TO THE AFFECTED AREA TWICE DAILY IN THE MORNING AND IN THE EVENING, Disp: , Rfl:     oxyCODONE (Roxicodone) 10 mg immediate release tablet, Take 1 tablet (10 mg) by mouth 4 times a day as needed for severe pain (7 - 10) for up to 28 days. Do not fill before March 28, 2025., Disp: 112 tablet, Rfl: 0    [START ON 4/25/2025] oxyCODONE (Roxicodone) 10 mg immediate release tablet, Take 1 tablet (10 mg) by mouth 4 times a day as needed for severe pain (7 - 10)  "for up to 28 days. Do not fill before April 25, 2025., Disp: 112 tablet, Rfl: 0    [START ON 5/23/2025] oxyCODONE (Roxicodone) 10 mg immediate release tablet, Take 1 tablet (10 mg) by mouth 4 times a day as needed for severe pain (7 - 10) for up to 28 days. Do not fill before May 23, 2025., Disp: 112 tablet, Rfl: 0    pen needle, diabetic (BD Ultra-Fine Short Pen Needle) 31 gauge x 5/16\" needle, Use as directed, Disp: , Rfl:     pen needle, diabetic 32 gauge x 5/32\" needle, Use 4 times per day, Disp: 400 each, Rfl: 3    pregabalin (Lyrica) 150 mg capsule, Take 1 capsule (150 mg) by mouth 3 times a day. Do not fill before March 17, 2025., Disp: 90 capsule, Rfl: 2    rosuvastatin (Crestor) 10 mg tablet, Take 1 tablet (10 mg) by mouth once daily., Disp: 90 tablet, Rfl: 3    Symbicort 160-4.5 mcg/actuation inhaler, Inhale 2 puffs 2 times a day., Disp: 1 g, Rfl: 11    tirzepatide (Mounjaro) 10 mg/0.5 mL pen injector, Inject 10 mg under the skin 1 (one) time per week., Disp: 2 mL, Rfl: 5    tirzepatide (Mounjaro) 7.5 mg/0.5 mL pen injector, Inject 7.5 mg under the skin 1 (one) time per week., Disp: 2 mL, Rfl: 0    fluticasone (Flonase) 50 mcg/actuation nasal spray, Administer 1 spray into each nostril once daily. Shake gently. Before first use, prime pump. After use, clean tip and replace cap., Disp: 16 g, Rfl: 1    oxyCODONE (Roxicodone) 10 mg immediate release tablet, Take 1 tablet (10 mg) by mouth every 6 hours if needed for severe pain (7 - 10) for up to 28 days., Disp: 112 tablet, Rfl: 0    polyethylene glycol (Glycolax, Miralax) 17 gram packet, Take 17 g by mouth once daily. Mix 1 cap (17g) into 8 ounces of fluid., Disp: 30 packet, Rfl: 2    Past Medical History:   Diagnosis Date    Candidiasis of skin and nail 10/01/2018    Cutaneous candidiasis    CHF (congestive heart failure)     Contact with and (suspected) exposure to covid-19 09/28/2021    Close exposure to COVID-19 virus    Duodenitis without bleeding " 02/23/2016    Duodenitis    Encounter for other preprocedural examination 02/25/2021    Preoperative clearance    Hyperlipidemia     Hypertension     Hypothyroidism     Infection and inflammatory reaction due to other internal joint prosthesis, initial encounter 01/28/2021    Infection of total knee replacement    Laceration without foreign body, right lower leg, subsequent encounter 06/11/2018    Leg laceration, right, subsequent encounter    Mixed hyperlipidemia     Other specified complication of vascular prosthetic devices, implants and grafts, initial encounter 12/22/2020    PICC line infiltration    Personal history of other diseases of the circulatory system 12/15/2022    History of atrial fibrillation    Personal history of other endocrine, nutritional and metabolic disease 04/30/2015    History of diabetes mellitus    Personal history of other malignant neoplasm of large intestine     History of malignant neoplasm of colon    Personal history of other malignant neoplasm of stomach     History of malignant neoplasm of stomach    Personal history of other specified conditions 10/01/2018    History of dysuria    Personal history of other specified conditions 04/30/2015    History of edema    Primary hypertension     Type 2 diabetes mellitus with diabetic polyneuropathy     Type 2 diabetes mellitus with foot ulcer (CODE) 12/03/2021    Diabetic ulcer of toe of left foot associated with type 2 diabetes mellitus, limited to breakdown of skin    Type 2 diabetes mellitus with hyperglycemia, with long-term current use of insulin     Unspecified abdominal pain 07/07/2021    Abdominal cramping       Past Surgical History:   Procedure Laterality Date    KNEE SURGERY  04/30/2015    Knee Surgery    OTHER SURGICAL HISTORY  04/30/2015    Arthrotomy Of Knee With Medial Meniscectomy    OTHER SURGICAL HISTORY  01/29/2021    Colon surgery    TOTAL KNEE ARTHROPLASTY  04/30/2015    Total Knee Arthroplasty       Family History  "  Problem Relation Name Age of Onset    Stroke Mother      Dementia Mother      Pneumonia Mother      Heart disease Father      Diabetes Father      Cancer Father      Arthritis Father      Skin cancer Father      Melanoma Father      Gout Father      Heart attack Father      Other (Cardiac disorder) Father         Objective   There were no vitals taken for this visit.    Physical Exam  Constitutional:       Comments: no acute distress, alert/conversational, appropriate affect, no focal neurological deficits noted via video appointment          Assessment/Plan   Problem List Items Addressed This Visit             ICD-10-CM       High    Long COVID - Primary U09.9     04/2025: shortness of breath, poor sleep, chronic pain, brain fog, fatigue, headaches, smell and taste changes, rhinitis, dysphagia, constipation, numbness and tingling, mood changes, edema, dizziness, hair loss  -comprehensive blood work, please have this drawn in the morning, fasting except for water   -continue close follow up with your specialists and their recommendations  -continue to work on optimizing your sleep apnea treatments as this is likely contributing to fatigue, brain fog, depression/anxiety, and headache.   -referral to Occupational Therapist Klarissa Wu for cognitive rehabilitation and fatigue management, please call [phone] to set up a virtual or in-person appointment at [facility]   -Miralax prescribed for constipation likely related to oxycodone  -swallow eval ordered  -smell retraining therapy for smell and taste changes, if no improvement we will refer to ENT   -use Flonase nose spray once daily to help with rhinitis and smell retraining therapy, prescription sent  -continue to use compression stockings during the day  -slowly increase activity as tolerated, we can consider PT referral in the future  -consider joining [facility] Psychology support group virtually  -additional recommendations provided under \"Patient " "Education\" section          Relevant Medications    fluticasone (Flonase) 50 mcg/actuation nasal spray    polyethylene glycol (Glycolax, Miralax) 17 gram packet    Other Relevant Orders    Referral to Occupational Therapy    Tryptase    Serum Protein Electrophoresis    Troponin I, High Sensitivity    Vitamin B1, Whole Blood    Vitamin B6    Cortisol AM    ALMAZ with Reflex to JOSSELINE    C-Reactive Protein    Ferritin    Folate    CBC and Auto Differential    D-Dimer, Non VTE    Rheumatoid Factor    Sedimentation Rate    TSH with reflex to Free T4 if abnormal    Iron and TIBC    Citrulline Antibody, IgG    B-Type Natriuretic Peptide    Creatine Kinase    Comprehensive Metabolic Panel    Vitamin B12    Vitamin D 25-Hydroxy,Total (for eval of Vitamin D levels)    Magnesium    Folate     Other Visit Diagnoses         Codes    Sinus complaint     R09.89    Relevant Medications    fluticasone (Flonase) 50 mcg/actuation nasal spray    Drug-induced constipation     K59.03    Relevant Medications    polyethylene glycol (Glycolax, Miralax) 17 gram packet    Dysphagia, unspecified type     R13.10    Relevant Orders    FL modified barium swallow study    Abnormal blood cell count     R79.9    Relevant Orders    Ferritin    Iron and TIBC    Dyspnea on exertion     R06.09    Relevant Orders    B-Type Natriuretic Peptide    BMI 45.0-49.9, adult (Multi)     Z68.42    Relevant Orders    Vitamin D 25-Hydroxy,Total (for eval of Vitamin D levels)            "

## 2025-03-25 ENCOUNTER — TELEMEDICINE CLINICAL SUPPORT (OUTPATIENT)
Dept: OTHER | Facility: CLINIC | Age: 65
End: 2025-03-25
Payer: MEDICARE

## 2025-03-25 DIAGNOSIS — Z86.16 PERSONAL HISTORY OF COVID-19: ICD-10-CM

## 2025-03-25 ASSESSMENT — MONTREAL COGNITIVE ASSESSMENT (MOCA)
11. FOR EACH PAIR OF WORDS, WHAT CATEGORY DO THEY BELONG TO (OUT OF 2): 2
9. REPEAT EACH SENTENCE: 2
7. [VIGILENCE] TAP WHEN HEARING DESIGNATED LETTER: 1
VISUOSPATIAL/EXECUTIVE SUBSCORE: 0
13. ORIENTATION SUBSCORE: 6
4. NAME EACH OF THE THREE ANIMALS SHOWN: 0
12. MEMORY INDEX SCORE: 1
8. SERIAL SUBTRACTION OF 7S: 3
5. MEMORY TRIALS: 0
WHAT LEVEL OF EDUCATION WAS ATTAINED: 1
6. READ LIST OF DIGITS [FORWARD/BACKWARD]: 2
10. [FLUENCY] NAME WORDS STARTING WITH DESIGNATED LETTER: 0
WHAT IS THE TOTAL SCORE (OUT OF 30): 18

## 2025-04-02 ENCOUNTER — TELEMEDICINE (OUTPATIENT)
Dept: OTHER | Facility: CLINIC | Age: 65
End: 2025-04-02
Payer: MEDICARE

## 2025-04-02 DIAGNOSIS — R09.89 SINUS COMPLAINT: ICD-10-CM

## 2025-04-02 DIAGNOSIS — K59.03 DRUG-INDUCED CONSTIPATION: ICD-10-CM

## 2025-04-02 DIAGNOSIS — U09.9 POST COVID-19 CONDITION, UNSPECIFIED: ICD-10-CM

## 2025-04-02 DIAGNOSIS — U09.9 LONG COVID: Primary | ICD-10-CM

## 2025-04-02 DIAGNOSIS — R06.09 DYSPNEA ON EXERTION: ICD-10-CM

## 2025-04-02 DIAGNOSIS — R13.10 DYSPHAGIA, UNSPECIFIED TYPE: ICD-10-CM

## 2025-04-02 DIAGNOSIS — R79.9 ABNORMAL BLOOD CELL COUNT: ICD-10-CM

## 2025-04-02 PROCEDURE — 99215 OFFICE O/P EST HI 40 MIN: CPT | Mod: 95 | Performed by: NURSE PRACTITIONER

## 2025-04-02 RX ORDER — POLYETHYLENE GLYCOL 3350 17 G/17G
17 POWDER, FOR SOLUTION ORAL DAILY
Qty: 30 PACKET | Refills: 2 | Status: SHIPPED | OUTPATIENT
Start: 2025-04-02 | End: 2025-07-01

## 2025-04-02 RX ORDER — FLUTICASONE PROPIONATE 50 MCG
1 SPRAY, SUSPENSION (ML) NASAL DAILY
Qty: 16 G | Refills: 1 | Status: SHIPPED | OUTPATIENT
Start: 2025-04-02 | End: 2025-07-01

## 2025-04-02 NOTE — ASSESSMENT & PLAN NOTE
"04/2025: shortness of breath, poor sleep, chronic pain, brain fog, fatigue, headaches, smell and taste changes, rhinitis, dysphagia, constipation, numbness and tingling, mood changes, edema, dizziness, hair loss  -comprehensive blood work, please have this drawn in the morning, fasting except for water   -continue close follow up with your specialists and their recommendations  -continue to work on optimizing your sleep apnea treatments as this is likely contributing to fatigue, brain fog, depression/anxiety, and headache.   -referral to Occupational Therapist Klarissa Wu for cognitive rehabilitation and fatigue management, please call [phone] to set up a virtual or in-person appointment at [facility]   -Miralax prescribed for constipation likely related to oxycodone  -swallow eval ordered  -smell retraining therapy for smell and taste changes, if no improvement we will refer to ENT   -use Flonase nose spray once daily to help with rhinitis and smell retraining therapy, prescription sent  -continue to use compression stockings during the day  -slowly increase activity as tolerated, we can consider PT referral in the future  -consider joining [facility] Psychology support group virtually  -additional recommendations provided under \"Patient Education\" section   "

## 2025-04-02 NOTE — PATIENT INSTRUCTIONS
It was my pleasure seeing you in the [facility] today.  We will focus on addressing the following symptoms discussed today: shortness of breath, poor sleep, chronic pain, brain fog, fatigue, headaches, smell and taste changes, rhinitis, dysphagia, constipation, numbness and tingling, mood changes, edema, dizziness, hair loss    My recommendations are as follows:  -comprehensive blood work, please have this drawn in the morning, fasting except for water   -continue close follow up with your specialists and their recommendations  -continue to work on optimizing your sleep apnea treatments as this is likely contributing to fatigue, brain fog, depression/anxiety, and headache.   -referral to Occupational Therapist Klarissa Wu for cognitive rehabilitation and fatigue management, please call [phone] to set [facility] for [facility] likely related to oxycodone  -swallow eval ordered  -smell retraining therapy for smell and taste changes, if no improvement we will refer to ENT   -use Flonase nose spray once daily to help with rhinitis and smell retraining therapy, prescription sent  -continue to use compression stockings during the day  -slowly increase activity as tolerated, we can consider PT referral in the future  -consider joining Long COVID Psychology support group virtually    I recommend conservative measures to help decrease inflammation. Prioritize sleep. Avoid dairy, added sugar, processed foods. Increase fiber intake through vegetables, fruit, beans, nuts, legumes, seeds, whole grains. engage in daily gentle physical activity such as walking. Spend time outdoors. Focus on stress reduction, meditations and/or mindfulness can help with this. Try Epsom baths and turmeric supplements.     Tips to help improve brain fog and fatigue:  --avoid drinking Alcohol while recovering from Long COVID  --Focus on eating whole foods to help  support your gut and immune system. Aim to eat 30 different plants per week (vegetables, fruits, beans, nuts, legumes, seeds, whole grains, herbs, spices). Avoid processed foods and beverages. Eliminate added sugars, artificial sweeteners, processed oils, artificial dyes.  --ensure to practice 30 minutes of exercise 7 days per week to keep BNDGF (brain derived neurotrophic growth factor) elevated as this will help in the regeneration of neurons, you may split exercise time up into 5 minute increments if this is better tolerated.   --slowly increase your activity by no more than 10% per week, rest when you feel tired  --utilize pacing techniques to manage fatigue, schedule rest times throughout the day so you do not run out of energy, more information to be found on this here: http://www.[email or web address]/health-info-site/Documents/post_covid-19_fatigue.pdf  --use the “attention beam” strategy to help you focus on some things while ignoring others. Imagine a flashlight beam illuminating the task that you are trying to focus on while leaving everything else in the dark.  --minimize external distractions to help with attention. For example, turn off the TV, radio, music, heater, and other electrical equipment, and close the window to screen out traffic noise. Complete important or difficult tasks in a quiet room if possible. Switch off mobile phones, switch off automatic email notifications. Use ear plugs if necessary or noise-cancelling headphones. Reduce visual distractions by clearing off your work-space, sit opposite to a window. Make sure lighting in the workspace is adequate.  --minimize internal distractions to help with attention. Thoughts, feelings, and physical sensations such as hunger or pain can be distracting. When you notice your attention beam is directed toward a thought or sensation, gently direct your attention back to the central focal point. You can also practice mindfulness and/or meditation to help reduce  "internal distractions  --games that can be tried to help improve memory and attention are Brain HQ and N-Back games  --mindfulness and meditation can be learned with the smartphone apps “Unwinding Anxiety” and “Headspace”  --Review the  Health Talk on Managing Fatigue and Thinking Changes after COVID-19 here: https://www.hospitals.org/Health-Talks/articles/2022/05/managing-fatigue-and-thinking-changes-after-covid-19  --You can also find many helpful tips and tricks in this book: \"The Long COVID self-help guide, practical ways to manage symptoms\" by [facility] Specialists from the [facility] [place]  --additional apps, books, and podcasts for patients suffering from Long COVID can be found at https://[email or web address]/public-health/long-covid/long-covid-apps-books-podcasts/     To help improve sleep:  --try Melatonin children's liquid drops 1-2mg underneath your tongue 30 minutes before you go to bed  --go to bed at the same time each night and get up at the same time each morning, including the weekend  --goal of 7-9 hours of uninterrupted sleep per night  --make sure your bedroom is quiet, dark, relaxing, and at a comfortable temperature  --remove electronic devices, such as TVs, computers, and smart phones, from your bedroom  --avoid caffeine after the morning and large meals before bed  --drink plenty of water throughout the day but avoid drinking fluids two hours before bed  --expose yourself to bright light in the morning  --engage in a calming bed-time routine of warm bath/shower, gratitude journal, guided meditation, etc.  --do not lay awake in bed for more than 20 minutes, get up and engage in a soothing activity such as reading a boring book until you feel sleepy     General headache recommendations:  -avoid common triggers which include red wine, dark beer, aged cheese, nuts, onions, chocolate, aspartame, processed meats and nitrates, excessive caffeine, caffeine withdrawal, fasting, " skipping meals, barometric pressure change, bright light, poor air quality, odors  -avoid overuse of OTC medications such as Ibuprofen, Naproxen, Excedrin, etc. as this can lead to rebound headaches  -maintain a stable and consistent sleep schedule, even on weekends  -stay well-hydrated with non-caffeinated beverages  -avoid skipping r delaying meals  -aim for 30 minutes of movement per day, find something you enjoy so it doesn't feel like a chore  -Mind-Body and Stress Managements tools include acupuncture, chiropractic care, yoga, meditation, psychotherapy     We will send a message in Wild[employer] or call you with the results of your tests.  Further recommendations will follow based on testing results and your symptoms.   Please return to [facility] in 3 months, call [phone] or send a message through your Wild[employer] kapil if needed.    If you are interested in joining a clinical trial, look into the following resources:  https://clinicaltrials.gov/  [email or web address].recovercovid.org/  [email or web address]-[email or web address]

## 2025-04-10 ENCOUNTER — OFFICE VISIT (OUTPATIENT)
Dept: CARDIOLOGY | Facility: CLINIC | Age: 65
End: 2025-04-10
Payer: MEDICARE

## 2025-04-10 VITALS
DIASTOLIC BLOOD PRESSURE: 76 MMHG | WEIGHT: 315 LBS | HEART RATE: 70 BPM | OXYGEN SATURATION: 92 % | SYSTOLIC BLOOD PRESSURE: 138 MMHG | BODY MASS INDEX: 46.67 KG/M2

## 2025-04-10 DIAGNOSIS — I25.10 CORONARY ARTERY CALCIFICATION SEEN ON CT SCAN: Primary | ICD-10-CM

## 2025-04-10 DIAGNOSIS — I70.0 ATHEROSCLEROSIS OF AORTA (CMS-HCC): ICD-10-CM

## 2025-04-10 DIAGNOSIS — I50.30 HEART FAILURE WITH PRESERVED LEFT VENTRICULAR FUNCTION (HFPEF): ICD-10-CM

## 2025-04-10 DIAGNOSIS — N40.0 BENIGN PROSTATIC HYPERPLASIA, UNSPECIFIED WHETHER LOWER URINARY TRACT SYMPTOMS PRESENT: ICD-10-CM

## 2025-04-10 DIAGNOSIS — Z86.79 HISTORY OF ATRIAL FIBRILLATION: ICD-10-CM

## 2025-04-10 LAB
ATRIAL RATE: 72 BPM
P AXIS: 41 DEGREES
P OFFSET: 165 MS
P ONSET: 109 MS
PR INTERVAL: 202 MS
Q ONSET: 210 MS
QRS COUNT: 12 BEATS
QRS DURATION: 104 MS
QT INTERVAL: 420 MS
QTC CALCULATION(BAZETT): 459 MS
QTC FREDERICIA: 446 MS
R AXIS: -47 DEGREES
T AXIS: 27 DEGREES
T OFFSET: 420 MS
VENTRICULAR RATE: 72 BPM

## 2025-04-10 PROCEDURE — 99214 OFFICE O/P EST MOD 30 MIN: CPT | Performed by: INTERNAL MEDICINE

## 2025-04-10 PROCEDURE — 3075F SYST BP GE 130 - 139MM HG: CPT | Performed by: INTERNAL MEDICINE

## 2025-04-10 PROCEDURE — 3052F HG A1C>EQUAL 8.0%<EQUAL 9.0%: CPT | Performed by: INTERNAL MEDICINE

## 2025-04-10 PROCEDURE — 93005 ELECTROCARDIOGRAM TRACING: CPT | Performed by: INTERNAL MEDICINE

## 2025-04-10 PROCEDURE — 3078F DIAST BP <80 MM HG: CPT | Performed by: INTERNAL MEDICINE

## 2025-04-10 PROCEDURE — 1159F MED LIST DOCD IN RCRD: CPT | Performed by: INTERNAL MEDICINE

## 2025-04-10 PROCEDURE — 1036F TOBACCO NON-USER: CPT | Performed by: INTERNAL MEDICINE

## 2025-04-10 PROCEDURE — 1123F ACP DISCUSS/DSCN MKR DOCD: CPT | Performed by: INTERNAL MEDICINE

## 2025-04-10 RX ORDER — FUROSEMIDE 20 MG/1
20 TABLET ORAL 2 TIMES DAILY PRN
Qty: 180 TABLET | Refills: 1 | Status: SHIPPED | OUTPATIENT
Start: 2025-04-10 | End: 2026-04-10

## 2025-04-10 RX ORDER — FINASTERIDE 5 MG/1
5 TABLET, FILM COATED ORAL DAILY
Qty: 90 TABLET | Refills: 3 | Status: SHIPPED | OUTPATIENT
Start: 2025-04-10 | End: 2026-04-10

## 2025-04-10 ASSESSMENT — ENCOUNTER SYMPTOMS
NAUSEA: 0
MEMORY LOSS: 0
DYSURIA: 0
DIARRHEA: 0
CONSTIPATION: 0
HEADACHES: 0
WHEEZING: 0
FALLS: 0
HEMOPTYSIS: 0
MYALGIAS: 0
ABDOMINAL PAIN: 0
ALTERED MENTAL STATUS: 0
DEPRESSION: 0
CHILLS: 0
COUGH: 0
BLOATING: 0
FEVER: 0
VOMITING: 0
HEMATURIA: 0

## 2025-04-10 NOTE — PATIENT INSTRUCTIONS
Add Finasteride 5mg 1x/day for prostate and hair    Can take extra Furosemide 20mg as needed for swelling, short of breath of weight gain

## 2025-04-10 NOTE — PROGRESS NOTES
Chief Complaint   Patient presents with    Atrial Fibrillation    Heart Failure       HPI  65 yo WM w/ h/o AFIB x1 (10/21 during COVID), HFpEF, mild CAC, athero of Ao, ?mild AsAo aneurysm, DM, IBS, RICCI (intol CPAP) now here for cardiology f/u.   He has long h/o occ  pinpoint CP x days that prev resolved w/ chiropractic manipulation. No other CP. No dyspnea at rest. +ch PERSAUD, improved w/ Lasix. No orthopnea/PND. No palps. +occ brief LH on standing up. No syncope. No cough. +ch LE edema, improved w/ Lasix. +hair loss. +nocturia.  ECG 12/20: ST (109), [facility]  ECG 10/21: SR (92), LVH  ECG 10/21: AFIB (125)  ECG 10/21: AFIB (70), LBH  ECG 10/22: SR (74), LAFB  ECG 4/25: SR (720, LAFB  Echo 10/20: EF 55-60%, mild LAE, mild TR, PASP 47  Echo 10/22: TDS, EF 55-60%, DD, ?nl LA, valves ok  CXR 3/23: no further airspace dz, some gen int prom remains (?CILD)  CTA chest 6/21: no PE  CT chest 12/21: mild cor calc, nl heart size, no peric eff, min athero of Ao, no aneurysm  CT chest 12/22: AsAo 4.2cm, athero of Ao + branches, stable CM, no peric eff, mild prom PA, stable faint GGOs  CT chest 2/24: mod CAC, nl Ao/PA size  PFT 3/22: restriction, mod red DLCO  CT ab 10/22: athero of Ao + branches, no AAA, nl IVC  LE US 12/20: no DVT     Review of Systems   Constitutional: Negative for chills, fever and malaise/fatigue.   HENT:  Negative for hearing loss.    Eyes:  Negative for visual disturbance.   Respiratory:  Negative for cough, hemoptysis and wheezing.    Skin:  Negative for rash.   Musculoskeletal:  Negative for falls and myalgias.   Gastrointestinal:  Negative for bloating, abdominal pain, constipation, diarrhea, dysphagia, nausea and vomiting.   Genitourinary:  Positive for nocturia. Negative for dysuria and hematuria.   Neurological:  Negative for headaches.   Psychiatric/Behavioral:  Negative for altered mental status, depression and memory loss.       Social History     Tobacco Use    Smoking status: Never     Passive exposure:  "Never    Smokeless tobacco: Never   Substance Use Topics    Alcohol use: Yes     Comment: rare      Family History   Problem Relation Name Age of Onset    Stroke Mother      Dementia Mother      Pneumonia Mother      Heart disease Father      Diabetes Father      Cancer Father      Arthritis Father      Skin cancer Father      Melanoma Father      Gout Father      Heart attack Father      Other (Cardiac disorder) Father        Allergies   Allergen Reactions    Topiramate Shortness of breath    Terbinafine Unknown and Swelling    Penicillins Rash and Other     \"red and hot\"      Current Outpatient Medications   Medication Instructions    albuterol 90 mcg/actuation inhaler 2 puffs, Every 4 hours PRN    baclofen (LIORESAL) 10 mg, oral, 3 times daily    blood-glucose sensor (FreeStyle Casandra 3 Sensor) device Apply one sensor to the skin every 14 days to test blood sugars    cephalexin (KEFLEX) 500 mg, oral, 2 times daily    empagliflozin (JARDIANCE) 25 mg, oral, Daily    fluticasone (Flonase) 50 mcg/actuation nasal spray 1 spray, Each Nostril, Daily, Shake gently. Before first use, prime pump. After use, clean tip and replace cap.    FreeStyle glucose monitoring (FreeStyle Lite Meter) kit 1 each    FreeStyle Casandra 3 [place] misc Use as instructed to check blood sugar    FreeStyle Lite Strips strip 4 times daily    furosemide (LASIX) 80 mg, oral, 2 times daily    HumaLOG KwikPen Insulin 100 unit/mL injection Use as directed with meals up to 110 units a day disp 105 ml 90 day supply    insulin glargine (Toujeo Max U-300 SoloStar) 300 unit/mL (3 mL) injection Up to 100 units daily, Take as directed per insulin instructions.    metoprolol tartrate (LOPRESSOR) 25 mg, oral, 2 times daily    Mounjaro 5 mg, subcutaneous, Every 7 days    Mounjaro 7.5 mg, subcutaneous, Once Weekly    Mounjaro 10 mg, subcutaneous, Once Weekly    naloxone (NARCAN) 4 mg, nasal, As needed    nystatin (Mycostatin) cream APPLY AND RUB IN A THIN LAYER " "TOPICALLY TO THE AFFECTED AREA TWICE DAILY IN THE MORNING AND IN THE EVENING    oxyCODONE (ROXICODONE) 10 mg, oral, Every 6 hours PRN    oxyCODONE (ROXICODONE) 10 mg, oral, 4 times daily PRN    [START ON 4/25/2025] oxyCODONE (ROXICODONE) 10 mg, oral, 4 times daily PRN    [START ON 5/23/2025] oxyCODONE (ROXICODONE) 10 mg, oral, 4 times daily PRN    pen needle, diabetic (BD Ultra-Fine Short Pen Needle) 31 gauge x 5/16\" needle Use as directed    pen needle, diabetic 32 gauge x 5/32\" needle Use 4 times per day    polyethylene glycol (GLYCOLAX, MIRALAX) 17 g, oral, Daily, Mix 1 cap (17g) into 8 ounces of fluid.    pregabalin (LYRICA) 150 mg, oral, 3 times daily    rosuvastatin (CRESTOR) 10 mg, oral, Daily    Symbicort 160-4.5 mcg/actuation inhaler 2 puffs, inhalation, 2 times daily       Vitals:    04/10/25 0844   BP: 138/76   Pulse: 70   SpO2: 92%      Physical Exam  Constitutional:       Appearance: Normal appearance.   HENT:      Head: Normocephalic and atraumatic.      Nose: Nose normal.   Neck:      Vascular: No carotid bruit.   Cardiovascular:      Rate and Rhythm: Normal rate and regular rhythm.      Heart sounds: No murmur heard.  Pulmonary:      Effort: Pulmonary effort is normal.      Breath sounds: Normal breath sounds.   Abdominal:      Palpations: Abdomen is soft.      Tenderness: There is no abdominal tenderness.   Musculoskeletal:      Right lower leg: Edema present.      Left lower leg: Edema present.      Comments: Tr LE edema   Skin:     General: Skin is warm and dry.   Neurological:      General: No focal deficit present.      Mental Status: He is alert.   Psychiatric:         Mood and Affect: Mood normal.         Judgment: Judgment normal.       Results/Data  8/23 Cr 1.01, K 3.9, Mg 2.31, LDL 92, HDL 44, , Chol 205, HGB 17.3, , TSH 4.03,BNP 38  11/22 Cr 1.02, K 4.1, MG 2.28, BNP 24  10/22 Cr 0.8, K 4.1, LFT nl, LDL 77, HDL 39, , Chol 192, HGB 15.5,   10/21   2/21 LDL " 94, HDL 41, , Chol 162      Assessment/Plan   65 yo WM w/ h/o AFIB x1 (10/21 during COVID), HFpEF, mild CAC, athero of Ao, ?mild AsAo aneurysm, DM, IBS, RICCI (intol CPAP). Doing fair. Appears near compensated.   -continue ASA 81 qd (with food)  -continue Metoprolol 25 bid  -continue Lasix 80 bid + extra 20 bid prn  -consider statin; LDL 92 on Colestipol (using for IBS) and he already has many pains  -continue Jardiance 25 every day  -add Finasteride 5 every day for hair loss and nocturia  -f/u 9 months (earlier if needed)     Marcos Thomson MD

## 2025-04-11 DIAGNOSIS — E55.9 VITAMIN D DEFICIENCY: ICD-10-CM

## 2025-04-11 DIAGNOSIS — R06.02 SHORTNESS OF BREATH: Primary | ICD-10-CM

## 2025-04-11 DIAGNOSIS — R79.89 D-DIMER, ELEVATED: ICD-10-CM

## 2025-04-11 RX ORDER — CHOLECALCIFEROL (VITAMIN D3) 1250 MCG
1.25 TABLET ORAL
Qty: 12 TABLET | Refills: 0 | Status: SHIPPED | OUTPATIENT
Start: 2025-04-11

## 2025-04-12 ENCOUNTER — HOSPITAL ENCOUNTER (OUTPATIENT)
Dept: RADIOLOGY | Facility: HOSPITAL | Age: 65
Discharge: HOME | End: 2025-04-12
Payer: MEDICARE

## 2025-04-12 DIAGNOSIS — R06.02 SHORTNESS OF BREATH: ICD-10-CM

## 2025-04-12 DIAGNOSIS — R79.89 D-DIMER, ELEVATED: ICD-10-CM

## 2025-04-12 PROCEDURE — 71275 CT ANGIOGRAPHY CHEST: CPT

## 2025-04-12 PROCEDURE — 2550000001 HC RX 255 CONTRASTS: Performed by: NURSE PRACTITIONER

## 2025-04-12 RX ADMIN — IOHEXOL 75 ML: 350 INJECTION, SOLUTION INTRAVENOUS at 14:47

## 2025-04-13 LAB
25(OH)D3+25(OH)D2 SERPL-MCNC: 20 NG/ML (ref 30–100)
ALBUMIN SERPL-MCNC: 4 G/DL (ref 3.6–5.1)
ALP SERPL-CCNC: 120 U/L (ref 35–144)
ALT SERPL-CCNC: 22 U/L (ref 9–46)
ANA SER QL IF: NEGATIVE
ANION GAP SERPL CALCULATED.4IONS-SCNC: 13 MMOL/L (CALC) (ref 7–17)
AST SERPL-CCNC: 21 U/L (ref 10–35)
BASOPHILS # BLD AUTO: 62 CELLS/UL (ref 0–200)
BASOPHILS NFR BLD AUTO: 0.9 %
BILIRUB SERPL-MCNC: 0.4 MG/DL (ref 0.2–1.2)
BNP SERPL-MCNC: 30 PG/ML
BUN SERPL-MCNC: 16 MG/DL (ref 7–25)
CALCIUM SERPL-MCNC: 8.9 MG/DL (ref 8.6–10.3)
CCP IGG SERPL-ACNC: <16 UNITS
CHLORIDE SERPL-SCNC: 103 MMOL/L (ref 98–110)
CK SERPL-CCNC: 115 U/L (ref 22–308)
CO2 SERPL-SCNC: 24 MMOL/L (ref 20–32)
CORTIS AM PEAK SERPL-MCNC: 4.6 MCG/DL
CREAT SERPL-MCNC: 1.16 MG/DL (ref 0.7–1.35)
CRP SERPL-MCNC: 12.3 MG/L
D DIMER PPP FEU-MCNC: 1.03 MCG/ML FEU
EGFRCR SERPLBLD CKD-EPI 2021: 69 ML/MIN/1.73M2
EOSINOPHIL # BLD AUTO: 138 CELLS/UL (ref 15–500)
EOSINOPHIL NFR BLD AUTO: 2 %
ERYTHROCYTE [DISTWIDTH] IN BLOOD BY AUTOMATED COUNT: 15.4 % (ref 11–15)
ERYTHROCYTE [SEDIMENTATION RATE] IN BLOOD BY WESTERGREN METHOD: 14 MM/H
FERRITIN SERPL-MCNC: 193 NG/ML (ref 24–380)
GLUCOSE SERPL-MCNC: 237 MG/DL (ref 65–99)
HCT VFR BLD AUTO: 48.5 % (ref 38.5–50)
HGB BLD-MCNC: 15.9 G/DL (ref 13.2–17.1)
IRON SATN MFR SERPL: 28 % (CALC) (ref 20–48)
IRON SERPL-MCNC: 74 MCG/DL (ref 50–180)
LYMPHOCYTES # BLD AUTO: 2325 CELLS/UL (ref 850–3900)
LYMPHOCYTES NFR BLD AUTO: 33.7 %
MAGNESIUM SERPL-MCNC: 2.4 MG/DL (ref 1.5–2.5)
MCH RBC QN AUTO: 28.9 PG (ref 27–33)
MCHC RBC AUTO-ENTMCNC: 32.8 G/DL (ref 32–36)
MCV RBC AUTO: 88 FL (ref 80–100)
MONOCYTES # BLD AUTO: 842 CELLS/UL (ref 200–950)
MONOCYTES NFR BLD AUTO: 12.2 %
NEUTROPHILS # BLD AUTO: 3533 CELLS/UL (ref 1500–7800)
NEUTROPHILS NFR BLD AUTO: 51.2 %
PLATELET # BLD AUTO: 192 THOUSAND/UL (ref 140–400)
PMV BLD REES-ECKER: 12.4 FL (ref 7.5–12.5)
POTASSIUM SERPL-SCNC: 3.8 MMOL/L (ref 3.5–5.3)
PROT SERPL-MCNC: 6.7 G/DL (ref 6.1–8.1)
RBC # BLD AUTO: 5.51 MILLION/UL (ref 4.2–5.8)
RHEUMATOID FACT SERPL-ACNC: <10 IU/ML
SODIUM SERPL-SCNC: 140 MMOL/L (ref 135–146)
T4 FREE SERPL-MCNC: 0.9 NG/DL (ref 0.8–1.8)
TIBC SERPL-MCNC: 268 MCG/DL (CALC) (ref 250–425)
TROPONIN I SERPL-MCNC: 8 NG/L
TRYPTASE SERPL-MCNC: 6 MCG/L
TSH SERPL-ACNC: 5.09 MIU/L (ref 0.4–4.5)
VIT B1 BLD-SCNC: NORMAL NMOL/L
WBC # BLD AUTO: 6.9 THOUSAND/UL (ref 3.8–10.8)

## 2025-04-19 LAB
25(OH)D3+25(OH)D2 SERPL-MCNC: 20 NG/ML (ref 30–100)
ALBUMIN SERPL-MCNC: 4 G/DL (ref 3.6–5.1)
ALP SERPL-CCNC: 120 U/L (ref 35–144)
ALT SERPL-CCNC: 22 U/L (ref 9–46)
ANA SER QL IF: NEGATIVE
ANION GAP SERPL CALCULATED.4IONS-SCNC: 13 MMOL/L (CALC) (ref 7–17)
AST SERPL-CCNC: 21 U/L (ref 10–35)
BASOPHILS # BLD AUTO: 62 CELLS/UL (ref 0–200)
BASOPHILS NFR BLD AUTO: 0.9 %
BILIRUB SERPL-MCNC: 0.4 MG/DL (ref 0.2–1.2)
BNP SERPL-MCNC: 30 PG/ML
BUN SERPL-MCNC: 16 MG/DL (ref 7–25)
CALCIUM SERPL-MCNC: 8.9 MG/DL (ref 8.6–10.3)
CCP IGG SERPL-ACNC: <16 UNITS
CHLORIDE SERPL-SCNC: 103 MMOL/L (ref 98–110)
CK SERPL-CCNC: 115 U/L (ref 22–308)
CO2 SERPL-SCNC: 24 MMOL/L (ref 20–32)
CORTIS AM PEAK SERPL-MCNC: 4.6 MCG/DL
CREAT SERPL-MCNC: 1.16 MG/DL (ref 0.7–1.35)
CRP SERPL-MCNC: 12.3 MG/L
D DIMER PPP FEU-MCNC: 1.03 MCG/ML FEU
EGFRCR SERPLBLD CKD-EPI 2021: 69 ML/MIN/1.73M2
EOSINOPHIL # BLD AUTO: 138 CELLS/UL (ref 15–500)
EOSINOPHIL NFR BLD AUTO: 2 %
ERYTHROCYTE [DISTWIDTH] IN BLOOD BY AUTOMATED COUNT: 15.4 % (ref 11–15)
ERYTHROCYTE [SEDIMENTATION RATE] IN BLOOD BY WESTERGREN METHOD: 14 MM/H
FERRITIN SERPL-MCNC: 193 NG/ML (ref 24–380)
GLUCOSE SERPL-MCNC: 237 MG/DL (ref 65–99)
HCT VFR BLD AUTO: 48.5 % (ref 38.5–50)
HGB BLD-MCNC: 15.9 G/DL (ref 13.2–17.1)
IRON SATN MFR SERPL: 28 % (CALC) (ref 20–48)
IRON SERPL-MCNC: 74 MCG/DL (ref 50–180)
LYMPHOCYTES # BLD AUTO: 2325 CELLS/UL (ref 850–3900)
LYMPHOCYTES NFR BLD AUTO: 33.7 %
MAGNESIUM SERPL-MCNC: 2.4 MG/DL (ref 1.5–2.5)
MCH RBC QN AUTO: 28.9 PG (ref 27–33)
MCHC RBC AUTO-ENTMCNC: 32.8 G/DL (ref 32–36)
MCV RBC AUTO: 88 FL (ref 80–100)
MONOCYTES # BLD AUTO: 842 CELLS/UL (ref 200–950)
MONOCYTES NFR BLD AUTO: 12.2 %
NEUTROPHILS # BLD AUTO: 3533 CELLS/UL (ref 1500–7800)
NEUTROPHILS NFR BLD AUTO: 51.2 %
PLATELET # BLD AUTO: 192 THOUSAND/UL (ref 140–400)
PMV BLD REES-ECKER: 12.4 FL (ref 7.5–12.5)
POTASSIUM SERPL-SCNC: 3.8 MMOL/L (ref 3.5–5.3)
PROT SERPL-MCNC: 6.7 G/DL (ref 6.1–8.1)
RBC # BLD AUTO: 5.51 MILLION/UL (ref 4.2–5.8)
RHEUMATOID FACT SERPL-ACNC: <10 IU/ML
SODIUM SERPL-SCNC: 140 MMOL/L (ref 135–146)
T4 FREE SERPL-MCNC: 0.9 NG/DL (ref 0.8–1.8)
TIBC SERPL-MCNC: 268 MCG/DL (CALC) (ref 250–425)
TROPONIN I SERPL-MCNC: 8 NG/L
TRYPTASE SERPL-MCNC: 6 MCG/L
TSH SERPL-ACNC: 5.09 MIU/L (ref 0.4–4.5)
VIT B1 BLD-SCNC: 172 NMOL/L (ref 78–185)
WBC # BLD AUTO: 6.9 THOUSAND/UL (ref 3.8–10.8)

## 2025-04-23 ENCOUNTER — HOSPITAL ENCOUNTER (OUTPATIENT)
Dept: RADIOLOGY | Facility: HOSPITAL | Age: 65
Discharge: HOME | End: 2025-04-23
Payer: MEDICARE

## 2025-04-23 DIAGNOSIS — R13.10 DYSPHAGIA, UNSPECIFIED TYPE: ICD-10-CM

## 2025-04-23 PROCEDURE — 74230 X-RAY XM SWLNG FUNCJ C+: CPT

## 2025-04-23 PROCEDURE — 92611 MOTION FLUOROSCOPY/SWALLOW: CPT | Mod: GN

## 2025-04-23 PROCEDURE — 2500000005 HC RX 250 GENERAL PHARMACY W/O HCPCS: Performed by: NURSE PRACTITIONER

## 2025-04-23 RX ADMIN — BARIUM SULFATE 5 ML: 400 SUSPENSION ORAL at 14:10

## 2025-04-23 RX ADMIN — BARIUM SULFATE 10 ML: 400 PASTE ORAL at 14:10

## 2025-04-23 RX ADMIN — BARIUM SULFATE 700 MG: 700 TABLET ORAL at 14:08

## 2025-04-23 RX ADMIN — BARIUM SULFATE 85 ML: 400 SUSPENSION ORAL at 14:09

## 2025-04-23 RX ADMIN — BARIUM SULFATE 90 ML: 0.81 POWDER, FOR SUSPENSION ORAL at 14:09

## 2025-04-23 NOTE — PROGRESS NOTES
"Speech-Language Pathology    SLP Adult Outpatient Modified Barium Swallow Study    Patient Name: Jayy Coles  MRN: [id number]  : 1960  Today's Date: 25     Time Calculation  Start Time: 1345  Stop Time: 1445  Time Calculation (min): 60 min       Modified Barium Swallow Study completed. Informed verbal consent obtained prior to completion of exam. Trials of thin, nectar/mildly thick liquid, puree, regular solids and a 13 mm barium tablet with thin liquid were given.   A 1.9 cm or .75 inch (outer diameter) ring was placed on the chin in the lateral view and on the left side of the neck in the A-P view in order to complete objective measurements during swallowing.   The anatomic structures and function of the oropharynx, larynx, hypopharynx and cervical esophagus were evaluated.    SLP: Elisha Monroe M.A. CCC-SLP  Contact info:   [employer]  Office phone: [phone], Option 2  [email or web address]    Reason for Referral: Pt referred by CNP from [facility] due to reported sensation of \"lump\" in his throat.  Per most recent progress note from referring provider:   \"Dysphagia, throat clogged up, has to get water and force it down, but even with liquids has difficulty swallowing, has not had that evaluated. No acid reflux, no nausea or vomiting.\"    Patient Hx: CHF, HTN, hyperlipidemia, hypothyroidism, DM 2, malignant neoplasm of colon, long covid    Respiratory Status: Oxygen via nasal canula  Current diet: Regular/thin liquids    Pain:  Pain Scale: 0-10  Ratin    General Visit Information: Pt seated upright in transport wheelchair for study. Pt was able to use cups, straws, and spoons independently.  Pt held emesis bag for portion of study due to feeling nauseous from ingestion of barium.  Pt given option to discontinue study, but chose to proceed.    DIET: Per the results of today's MBSS, the following is recommended:     - Regular (IDDSI Level 7), extra " moisture when possible  - Thin liquids (IDDSI Level 0)      Recommended Method of Medication Intake:    Whole with liquid    STRATEGIES:  - Small bites  - Small, single sips  - Alternate consistencies  - Add moisture to dry foods  - Upright for all PO intake    SLP PLAN:  Skilled SLP Services: No further skilled SLP intervention is warranted for dysphagia at this time.  Discussed POC: Patient  Discussed Risks/Benefits: Yes  Patient/Caregiver Agreeable: Yes    Education Provided: Results and recommendations per MBSS, as well as POC, were reviewed with pt and his wife at this time. SLP provided a printed list of recommended strategies for pt to follow at home.  Verbal understanding and agreement demonstrated.     Additional consult suggested: No new disciplines.    Repeat Study/ Discharge Plan: Not necessary unless symptoms worsen.     Mechanics of the Swallow Summary:    ORAL PHASE:  Lip Closure - No labial escape/anterior loss of bolus   Tongue Control During Bolus Hold - Cohesive bolus between tongue to palatal seal   Bolus prep/mastication - Timely and efficient mastication skills   Bolus transport/lingual motion - Delayed initiation of tongue motion for A-P movement of the bolus   Oral residue - Trace residue lining oral structures     PHARYNGEAL PHASE:  Initiation of pharyngeal swallow - Bolus head at posterior angle of ramus   Soft palate elevation - No bolus between soft palate/pharyngeal wall   Laryngeal elevation - Complete superior movement of thyroid cartilage with contact of arytenoids to epiglottic petiole   Anterior hyoid excursion - Complete anterior movement   Epiglottic movement - Complete inversion    Laryngeal vestibule closure - Complete - no air/contrast in laryngeal vestibule   Pharyngeal stripping wave - Complete  Pharyngeal contraction (A/P view) - Complete  Pharyngoesophageal segment opening - Partial distension/partial duration with partial obstruction of flow of bolus   Tongue base  retraction - No bolus between tongue base and posterior pharyngeal wall   Pharyngeal residue - Complete pharyngeal clearance     ESOPHAGEAL PHASE:  Esophageal clearance - Esophageal retention with retrograde flow below the pharyngoesophageal segment noted with 20 mL bolus thin liquids in A-P view.      SLP Impressions with Severity Rating:   Pt presents with mild pharyngeal dysphagia upon completion of modified barium swallow study this date.   Swallowing physiology is detailed above.   Impairments most impacting swallowing safety and efficiency are related to presence of cervical osteophyte at C5 that slightly protrudes forward, impacting the space available for ease of bolus flow.  Although all boluses within controlled limits of test were seen to pass through this area, this slight impingement could cause difficulty with larger boluses and a more rapid rate of intake.  Pt had to put forth extra effort to propel thicker boluses (pudding and cookie).  Patient demonstrated transient laryngeal penetration with thin liquids.   No further penetration was observed for any other consistency, and no aspiration was visualized during study.   Patient demonstrated trace to mild oral and pharyngeal residue that was reduced with additional spontaneous swallows.    *Of note: The A-P bolus follow-through is not intended to be utilized as a diagnostic assessment of the esophagus, rather a tool to observe the biomechanical aspects of the swallow continuum and to inform the need for further evaluation by medical specialists, as applicable.     OUTCOME MEASURES:  Functional Oral Intake Scale  Functional Oral Intake Scale: Level 7 total oral diet with no restrictions     Eating Assessment Tool (EAT-10)   0=No problem, 1=Mild problem, 2=Mild to moderate problem, 3=Moderate problem, 4=Severe problem    EAT 10  My swallowing problem has caused me to lose weight.: 0  My swallowing problem interferes with my ability to go out for meals.:  0  Swallowing liquids takes extra effort.: 3  Swallowing solids takes extra effort.: 3  Swallowing pills takes extra effort.: 3  Swallowing is painful: 4  The pleasure of eating is affected by my swallowing.: 2  When I swallow food sticks in my throat.: 3  I cough when I eat.: 0  Swallowing is stressful: 4  EAT-10 TOTAL SCORE:: 22    A total score of 3 or above may indicate difficulty with swallowing safely and/or efficiently    Rosenbek's Penetration Aspiration Scale    Thin Liquids: 2. PENETRATION that CLEARS - contrast enter airway, above vocal cords, no residue  Nectar Thick Liquids: 1. NO ASPIRATION & NO PENETRATION - no aspiration, contrast does not enter airway  Puree: 1. NO ASPIRATION & NO PENETRATION - no aspiration, contrast does not enter airway  Solids: 1. NO ASPIRATION & NO PENETRATION - no aspiration, contrast does not enter airway

## 2025-05-21 ENCOUNTER — OFFICE VISIT (OUTPATIENT)
Dept: PULMONOLOGY | Facility: CLINIC | Age: 65
End: 2025-05-21
Payer: MEDICARE

## 2025-05-21 VITALS
SYSTOLIC BLOOD PRESSURE: 111 MMHG | OXYGEN SATURATION: 90 % | DIASTOLIC BLOOD PRESSURE: 70 MMHG | WEIGHT: 313.9 LBS | RESPIRATION RATE: 16 BRPM | BODY MASS INDEX: 46.35 KG/M2 | HEART RATE: 73 BPM

## 2025-05-21 DIAGNOSIS — G47.30 BREATHING-RELATED SLEEP DISORDER: Primary | ICD-10-CM

## 2025-05-21 DIAGNOSIS — U09.9 LONG COVID: ICD-10-CM

## 2025-05-21 DIAGNOSIS — J45.20 MILD INTERMITTENT ASTHMA WITHOUT COMPLICATION (HHS-HCC): ICD-10-CM

## 2025-05-21 DIAGNOSIS — Z99.81 ON HOME OXYGEN THERAPY: ICD-10-CM

## 2025-05-21 PROCEDURE — 1160F RVW MEDS BY RX/DR IN RCRD: CPT | Performed by: INTERNAL MEDICINE

## 2025-05-21 PROCEDURE — 1036F TOBACCO NON-USER: CPT | Performed by: INTERNAL MEDICINE

## 2025-05-21 PROCEDURE — 99213 OFFICE O/P EST LOW 20 MIN: CPT | Performed by: INTERNAL MEDICINE

## 2025-05-21 PROCEDURE — 3052F HG A1C>EQUAL 8.0%<EQUAL 9.0%: CPT | Performed by: INTERNAL MEDICINE

## 2025-05-21 PROCEDURE — 3074F SYST BP LT 130 MM HG: CPT | Performed by: INTERNAL MEDICINE

## 2025-05-21 PROCEDURE — 1159F MED LIST DOCD IN RCRD: CPT | Performed by: INTERNAL MEDICINE

## 2025-05-21 PROCEDURE — 1126F AMNT PAIN NOTED NONE PRSNT: CPT | Performed by: INTERNAL MEDICINE

## 2025-05-21 PROCEDURE — 3078F DIAST BP <80 MM HG: CPT | Performed by: INTERNAL MEDICINE

## 2025-05-21 ASSESSMENT — ENCOUNTER SYMPTOMS
SLEEPING PROBLEMS DURING THE LAST MONTH: 1
COUGH: 0
DEPRESSION: 0
CHILLS: 0
FEVER: 0

## 2025-05-21 ASSESSMENT — COLUMBIA-SUICIDE SEVERITY RATING SCALE - C-SSRS
1. IN THE PAST MONTH, HAVE YOU WISHED YOU WERE DEAD OR WISHED YOU COULD GO TO SLEEP AND NOT WAKE UP?: NO
2. HAVE YOU ACTUALLY HAD ANY THOUGHTS OF KILLING YOURSELF?: NO
6. HAVE YOU EVER DONE ANYTHING, STARTED TO DO ANYTHING, OR PREPARED TO DO ANYTHING TO END YOUR LIFE?: NO

## 2025-05-21 ASSESSMENT — PATIENT HEALTH QUESTIONNAIRE - PHQ9
SUM OF ALL RESPONSES TO PHQ9 QUESTIONS 1 AND 2: 0
2. FEELING DOWN, DEPRESSED OR HOPELESS: NOT AT ALL
1. LITTLE INTEREST OR PLEASURE IN DOING THINGS: NOT AT ALL

## 2025-05-21 ASSESSMENT — PAIN SCALES - GENERAL: PAINLEVEL_OUTOF10: 0-NO PAIN

## 2025-05-21 NOTE — ASSESSMENT & PLAN NOTE
Severe RICCI - could not tolerate CPAP and had it sent back.  Pt currently on Mounjaro so hopefully weight loss will help his RICCI.

## 2025-05-21 NOTE — PROGRESS NOTES
Subjective   Patient ID: Jayy Coles is a 64 y.o. male who presents for Lung Eval.  h/o asthma (+methacholine 2012), mod restriction 10/2024 previously admitted w/ COVID here for f/u.  3L when he uses it. Completed WI. CT-chest w/ stable GGOs.  Thinks breathing is the same.  Received nasal mask for CPAP but did not help his ability to tolerate it.  Now on Monjauro.  No fevers, chills.  Some cough.  On Symbicort. Rarely uses rescue.  ET is limited by knee pain.  Had his CPAP sent back.     Sleeping Problem  Pertinent negatives include no chills, coughing, fever or rash.       Review of Systems   Constitutional:  Negative for chills and fever.   Respiratory:  Negative for cough.    Skin:  Negative for rash.       Objective   Physical Exam  Vitals reviewed.   Constitutional:       Appearance: Normal appearance. He is obese.   HENT:      Head: Normocephalic and atraumatic.   Eyes:      Extraocular Movements: Extraocular movements intact.   Cardiovascular:      Rate and Rhythm: Normal rate and regular rhythm.      Heart sounds: Normal heart sounds.   Pulmonary:      Effort: Pulmonary effort is normal.      Breath sounds: Normal breath sounds.   Abdominal:      Palpations: Abdomen is soft.      Tenderness: There is no abdominal tenderness.   Musculoskeletal:      Cervical back: Normal range of motion.   Skin:     General: Skin is warm.   Neurological:      General: No focal deficit present.      Mental Status: He is alert and oriented to person, place, and time. Mental status is at baseline.   Psychiatric:         Mood and Affect: Mood normal.         Behavior: Behavior normal.         Assessment/Plan   Problem List Items Addressed This Visit       Asthma    Mod restriction 10/2024. Cont Symbicort 160mcg. Check RAST.         Relevant Orders    Respiratory Allergy Profile IgE    On home oxygen therapy    Cont 3L as needed.  Also some cardiac component.  Cont diuretics.         Long COVID    GGOs likely related to post  COVID inflammation.  Stable on most recent imaging.         Breathing-related sleep disorder - Primary    Severe RICCI - could not tolerate CPAP and had it sent back.  Pt currently on Mounjaro so hopefully weight loss will help his RICCI.          RTC in 6 months.    Time Spent  Prep time on day of patient encounter: 5 minutes  Time spent directly with patient, family or caregiver: 10 minutes  Additional Time Spent on Patient Care Activities: 0 minutes  Documentation Time: 5 minutes  Other Time Spent: 0 minutes  Total: 20 minutes        Dusty Vásquez MD 05/21/25 3:31 PM

## 2025-06-02 DIAGNOSIS — U09.9 LONG COVID: ICD-10-CM

## 2025-06-02 DIAGNOSIS — R09.89 SINUS COMPLAINT: ICD-10-CM

## 2025-06-02 RX ORDER — FLUTICASONE PROPIONATE 50 MCG
SPRAY, SUSPENSION (ML) NASAL
Qty: 16 G | Refills: 1 | Status: SHIPPED | OUTPATIENT
Start: 2025-06-02

## 2025-06-16 ENCOUNTER — OFFICE VISIT (OUTPATIENT)
Dept: PAIN MEDICINE | Facility: CLINIC | Age: 65
End: 2025-06-16
Payer: MEDICARE

## 2025-06-16 VITALS
DIASTOLIC BLOOD PRESSURE: 62 MMHG | RESPIRATION RATE: 17 BRPM | SYSTOLIC BLOOD PRESSURE: 98 MMHG | HEART RATE: 75 BPM | OXYGEN SATURATION: 92 %

## 2025-06-16 DIAGNOSIS — M17.0 PRIMARY OSTEOARTHRITIS OF BOTH KNEES: ICD-10-CM

## 2025-06-16 DIAGNOSIS — Z79.891 ENCOUNTER FOR LONG-TERM USE OF OPIATE ANALGESIC: ICD-10-CM

## 2025-06-16 PROCEDURE — 3074F SYST BP LT 130 MM HG: CPT | Performed by: ANESTHESIOLOGY

## 2025-06-16 PROCEDURE — 99214 OFFICE O/P EST MOD 30 MIN: CPT | Performed by: ANESTHESIOLOGY

## 2025-06-16 PROCEDURE — 3052F HG A1C>EQUAL 8.0%<EQUAL 9.0%: CPT | Performed by: ANESTHESIOLOGY

## 2025-06-16 PROCEDURE — G2211 COMPLEX E/M VISIT ADD ON: HCPCS | Performed by: ANESTHESIOLOGY

## 2025-06-16 PROCEDURE — 3078F DIAST BP <80 MM HG: CPT | Performed by: ANESTHESIOLOGY

## 2025-06-16 PROCEDURE — 1125F AMNT PAIN NOTED PAIN PRSNT: CPT | Performed by: ANESTHESIOLOGY

## 2025-06-16 RX ORDER — NALOXONE HYDROCHLORIDE 4 MG/.1ML
1 SPRAY NASAL AS NEEDED
Qty: 2 EACH | Refills: 0 | Status: SHIPPED | OUTPATIENT
Start: 2025-06-16

## 2025-06-16 RX ORDER — OXYCODONE HYDROCHLORIDE 10 MG/1
10 TABLET ORAL 4 TIMES DAILY PRN
Qty: 112 TABLET | Refills: 0 | Status: SHIPPED | OUTPATIENT
Start: 2025-06-27 | End: 2025-07-25

## 2025-06-16 RX ORDER — OXYCODONE HYDROCHLORIDE 10 MG/1
10 TABLET ORAL EVERY 6 HOURS PRN
Qty: 112 TABLET | Refills: 0 | Status: SHIPPED | OUTPATIENT
Start: 2025-08-22 | End: 2025-09-19

## 2025-06-16 RX ORDER — BACLOFEN 10 MG/1
10 TABLET ORAL 3 TIMES DAILY
Qty: 90 TABLET | Refills: 2 | Status: SHIPPED | OUTPATIENT
Start: 2025-06-16 | End: 2025-09-14

## 2025-06-16 RX ORDER — PREGABALIN 150 MG/1
150 CAPSULE ORAL 3 TIMES DAILY
Qty: 90 CAPSULE | Refills: 2 | Status: SHIPPED | OUTPATIENT
Start: 2025-06-16 | End: 2025-09-14

## 2025-06-16 RX ORDER — OXYCODONE HYDROCHLORIDE 10 MG/1
10 TABLET ORAL 4 TIMES DAILY PRN
Qty: 112 TABLET | Refills: 0 | Status: SHIPPED | OUTPATIENT
Start: 2025-07-25 | End: 2025-08-22

## 2025-06-16 ASSESSMENT — PAIN SCALES - GENERAL: PAINLEVEL_OUTOF10: 4

## 2025-06-16 NOTE — PROGRESS NOTES
Subjective   Patient ID: Jayy Coles is a 64 y.o. male with history of bilateral knee osteoarthritis who presents for follow up of bilateral knee pain and medication refill.      HPI:   Currently, pain is sharp in quality, and patient states it is most pronounced in the bilateral knee area, R>L. Multiple surgeries on the L knee including replacement c/b infections and subsequent surgeries. Patient states R knee also needs replacement, but surgeon does not want to operate given hx of infection on the L. Has been wearing brace on the R knee for the past 2-3 months.  Receives corticosteroid injections into the R knee every 3 months from ortho. The pain does impact the patients ability to complete tasks and activities that they were previously able to complete. Patient is currently on 63 MME per day with Lyrica 150 3 times daily, IR oxycodone 10 mg 4 times daily, baclofen 10 mg 3 times daily. Patient reports that he is able to manage his ADLs and independently, his pain relief with medications is 100%.  Patient states that pain without medication is 7-9/10, with medication 3-4/10.  Patient states that his pain is stable on his current medication regimen and does not want to make any changes at this time.     I have personally reviewed the [place] report for Jayy Coles I have considered the risks of abuse, dependence, addiction and diversion    Controlled Substance Agreement:  Reviewed Controlled Substance Agreement including but not limited to the benefits, risks, and alternatives to treatment with a Controlled Substance medication(s).         Review of Systems   13-point ROS done and negative except for HPI.     Current Outpatient Medications   Medication Instructions    albuterol 90 mcg/actuation inhaler 2 puffs, Every 4 hours PRN    baclofen (LIORESAL) 10 mg, oral, 3 times daily    blood-glucose sensor (FreeStyle Casandra 3 Sensor) device Apply one sensor to the skin every 14 days to test blood sugars    cephalexin  "(KEFLEX) 500 mg, oral, 2 times daily    cholecalciferol (VITAMIN D-3) 1.25 mg, oral, Every 7 days    empagliflozin (JARDIANCE) 25 mg, oral, Daily    finasteride (PROSCAR) 5 mg, oral, Daily, Do not crush, chew, or split.    fluticasone (Flonase) 50 mcg/actuation nasal spray ADMINISTER 1 SPRAY IN EACH NOSTRIL ONCE DAILY    FreeStyle glucose monitoring (FreeStyle Lite Meter) kit 1 each    FreeStyle Casandra 3 [place] misc Use as instructed to check blood sugar    FreeStyle Lite Strips strip 4 times daily    furosemide (LASIX) 80 mg, oral, 2 times daily    furosemide (LASIX) 20 mg, oral, 2 times daily PRN    HumaLOG KwikPen Insulin 100 unit/mL injection Use as directed with meals up to 110 units a day disp 105 ml 90 day supply    insulin glargine (Toujeo Max U-300 SoloStar) 300 unit/mL (3 mL) injection Up to 100 units daily, Take as directed per insulin instructions.    metoprolol tartrate (LOPRESSOR) 25 mg, oral, 2 times daily    Mounjaro 5 mg, subcutaneous, Every 7 days    Mounjaro 7.5 mg, subcutaneous, Once Weekly    Mounjaro 10 mg, subcutaneous, Once Weekly    naloxone (NARCAN) 4 mg, nasal, As needed    nystatin (Mycostatin) cream APPLY AND RUB IN A THIN LAYER TOPICALLY TO THE AFFECTED AREA TWICE DAILY IN THE MORNING AND IN THE EVENING    oxyCODONE (ROXICODONE) 10 mg, oral, Every 6 hours PRN    oxyCODONE (ROXICODONE) 10 mg, oral, 4 times daily PRN    oxyCODONE (ROXICODONE) 10 mg, oral, 4 times daily PRN    oxyCODONE (ROXICODONE) 10 mg, oral, 4 times daily PRN    pen needle, diabetic (BD Ultra-Fine Short Pen Needle) 31 gauge x 5/16\" needle Use as directed    pen needle, diabetic 32 gauge x 5/32\" needle Use 4 times per day    polyethylene glycol (GLYCOLAX, MIRALAX) 17 g, oral, Daily, Mix 1 cap (17g) into 8 ounces of fluid.    pregabalin (LYRICA) 150 mg, oral, 3 times daily    rosuvastatin (CRESTOR) 10 mg, oral, Daily    Symbicort 160-4.5 mcg/actuation inhaler 2 puffs, inhalation, 2 times daily       Medical History[1] "     Surgical History[2]     Family History[3]     RX Allergies[4]     Objective     Last Recorded Vitals  BP 98/62 (BP Location: Right arm, Patient Position: Sitting, BP Cuff Size: Adult)   Pulse 75   Resp 17   SpO2 92%     Physical Exam  General: NAD, well groomed, well nourished  Eyes: Non-icteric sclera, EOMI  Ears, Nose, Mouth, and Throat: External ears and nose appear to be without deformity or rash. No lesions or masses noted. Hearing is grossly intact.   Neck: Trachea midline  Respiratory: Nonlabored breathing   Cardiovascular: no peripheral edema   Skin: No rashes or open lesions/ulcers identified on skin.    Knee: brace over R knee. L knee with well-healed vertical incisional scar. No visible erythema or warmth. No joint swelling.           Relevant Results  Current Outpatient Medications   Medication Instructions    albuterol 90 mcg/actuation inhaler 2 puffs, Every 4 hours PRN    baclofen (LIORESAL) 10 mg, oral, 3 times daily    blood-glucose sensor (FreeStyle Casandra 3 Sensor) device Apply one sensor to the skin every 14 days to test blood sugars    cephalexin (KEFLEX) 500 mg, oral, 2 times daily    cholecalciferol (VITAMIN D-3) 1.25 mg, oral, Every 7 days    empagliflozin (JARDIANCE) 25 mg, oral, Daily    finasteride (PROSCAR) 5 mg, oral, Daily, Do not crush, chew, or split.    fluticasone (Flonase) 50 mcg/actuation nasal spray ADMINISTER 1 SPRAY IN EACH NOSTRIL ONCE DAILY    FreeStyle glucose monitoring (FreeStyle Lite Meter) kit 1 each    FreeStyle Casandra 3 [place] misc Use as instructed to check blood sugar    FreeStyle Lite Strips strip 4 times daily    furosemide (LASIX) 80 mg, oral, 2 times daily    furosemide (LASIX) 20 mg, oral, 2 times daily PRN    HumaLOG KwikPen Insulin 100 unit/mL injection Use as directed with meals up to 110 units a day disp 105 ml 90 day supply    insulin glargine (Toujeo Max U-300 SoloStar) 300 unit/mL (3 mL) injection Up to 100 units daily, Take as directed per insulin  "instructions.    metoprolol tartrate (LOPRESSOR) 25 mg, oral, 2 times daily    Mounjaro 5 mg, subcutaneous, Every 7 days    Mounjaro 7.5 mg, subcutaneous, Once Weekly    Mounjaro 10 mg, subcutaneous, Once Weekly    naloxone (NARCAN) 4 mg, nasal, As needed    nystatin (Mycostatin) cream APPLY AND RUB IN A THIN LAYER TOPICALLY TO THE AFFECTED AREA TWICE DAILY IN THE MORNING AND IN THE EVENING    oxyCODONE (ROXICODONE) 10 mg, oral, Every 6 hours PRN    oxyCODONE (ROXICODONE) 10 mg, oral, 4 times daily PRN    oxyCODONE (ROXICODONE) 10 mg, oral, 4 times daily PRN    oxyCODONE (ROXICODONE) 10 mg, oral, 4 times daily PRN    pen needle, diabetic (BD Ultra-Fine Short Pen Needle) 31 gauge x 5/16\" needle Use as directed    pen needle, diabetic 32 gauge x 5/32\" needle Use 4 times per day    polyethylene glycol (GLYCOLAX, MIRALAX) 17 g, oral, Daily, Mix 1 cap (17g) into 8 ounces of fluid.    pregabalin (LYRICA) 150 mg, oral, 3 times daily    rosuvastatin (CRESTOR) 10 mg, oral, Daily    Symbicort 160-4.5 mcg/actuation inhaler 2 puffs, inhalation, 2 times daily       No results found for this or any previous visit from the past 1000 days.     No image results found.       1. Primary osteoarthritis of both knees        2. Encounter for long-term use of opiate analgesic             Assessment/Plan   Jayy Coles is a 64 y.o. male with history of bilateral knee osteoarthritis who presents for follow up of bilateral knee pain and medication refill. Patient is stable on current pain medication. Denies any side effects. Not a surgical candidate at this time. Will refill current medications. Discussed importance of maintaining adequate muscle strength to offload weightbearing on the knees with low impact aerobic exercise. Discussed at home aqua therapy as patient has a pool.     Plan:  - Refill Lyrica 150 3 times daily, IR oxycodone 10 mg 4 times daily, baclofen 10 mg 3 times daily.     Follow up: In 3 months    The patient was invited " to contact us back anytime with any questions or concerns and follow-up with us in the office as needed.     Diagnoses and all orders for this visit:  Primary osteoarthritis of both knees  Encounter for long-term use of opiate analgesic      This note was generated with the aid of dictation software, there may be typos despite my attempts at proofreading.     Patient seen and plan of care discussed with Dr. José Miguel Vo MD  Anesthesiology PGY2          [1]   Past Medical History:  Diagnosis Date    Candidiasis of skin and nail 10/01/2018    Cutaneous candidiasis    CHF (congestive heart failure)     Contact with and (suspected) exposure to covid-19 09/28/2021    Close exposure to COVID-19 virus    Duodenitis without bleeding 02/23/2016    Duodenitis    Encounter for other preprocedural examination 02/25/2021    Preoperative clearance    Hyperlipidemia     Hypertension     Hypothyroidism     Infection and inflammatory reaction due to other internal joint prosthesis, initial encounter 01/28/2021    Infection of total knee replacement    Laceration without foreign body, right lower leg, subsequent encounter 06/11/2018    Leg laceration, right, subsequent encounter    Mixed hyperlipidemia     Other specified complication of vascular prosthetic devices, implants and grafts, initial encounter 12/22/2020    PICC line infiltration    Personal history of other diseases of the circulatory system 12/15/2022    History of atrial fibrillation    Personal history of other endocrine, nutritional and metabolic disease 04/30/2015    History of diabetes mellitus    Personal history of other malignant neoplasm of large intestine     History of malignant neoplasm of colon    Personal history of other malignant neoplasm of stomach     History of malignant neoplasm of stomach    Personal history of other specified conditions 10/01/2018    History of dysuria    Personal history of other specified conditions 04/30/2015     "History of edema    Primary hypertension     Type 2 diabetes mellitus with diabetic polyneuropathy     Type 2 diabetes mellitus with foot ulcer (CODE) 12/03/2021    Diabetic ulcer of toe of left foot associated with type 2 diabetes mellitus, limited to breakdown of skin    Type 2 diabetes mellitus with hyperglycemia, with long-term current use of insulin     Unspecified abdominal pain 07/07/2021    Abdominal cramping   [2]   Past Surgical History:  Procedure Laterality Date    KNEE SURGERY  04/30/2015    Knee Surgery    OTHER SURGICAL HISTORY  04/30/2015    Arthrotomy Of Knee With Medial Meniscectomy    OTHER SURGICAL HISTORY  01/29/2021    Colon surgery    TOTAL KNEE ARTHROPLASTY  04/30/2015    Total Knee Arthroplasty   [3]   Family History  Problem Relation Name Age of Onset    Stroke Mother      Dementia Mother      Pneumonia Mother      Heart disease Father      Diabetes Father      Cancer Father      Arthritis Father      Skin cancer Father      Melanoma Father      Gout Father      Heart attack Father      Other (Cardiac disorder) Father     [4]   Allergies  Allergen Reactions    Topiramate Shortness of breath    Terbinafine Unknown and Swelling    Penicillins Rash and Other     \"red and hot\"     "

## 2025-06-26 DIAGNOSIS — Z79.4 TYPE 2 DIABETES MELLITUS WITH DIABETIC POLYNEUROPATHY, WITH LONG-TERM CURRENT USE OF INSULIN: ICD-10-CM

## 2025-06-26 DIAGNOSIS — E11.42 TYPE 2 DIABETES MELLITUS WITH DIABETIC POLYNEUROPATHY, WITH LONG-TERM CURRENT USE OF INSULIN: ICD-10-CM

## 2025-06-27 NOTE — PROGRESS NOTES
[facility] The virtual visit conducted with Audio and Video.    Verbal consent was given for the following virtual visit, patient is currently located in [place]. All issues discussed and addressed below were done so without a physical examination. If it was felt the patient needed be seen in clinic in person they were directed there.     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 Valerio, Ortho Dr. Mosquera, Pain Management Dr. Valdez, Endocrinology Dr. Beltran, Pulmonary Dr. Vásquez, Cardiology Dr. Thomson, Neurology Dr. Brunner    Survey Scores: 03/2025  PHQ-9: 15   ERICK-7: 18   Sleep Wellness: 4   FSS average: 7   Modified Ecog average: 1.5   MOCA: 18/22 (03/2025)  Overall Health: 20     64 y.o. male with a h/o COVID-19 as noted above, DM2, anemia, ascending arotic aneurysm, asthma, CAD, GERD, HFpEF, Afib, colon ca, obesity, hypothyroidism, chronic tension headaches, RICCI, [facility] with c/o shortness of breath, poor sleep, chronic pain, brain fog, fatigue, headaches, smell and taste changes, rhinitis, dysphagia, constipation, numbness and tingling, mood changes, edema, dizziness, hair loss    Has been worse cmpared to April  Knee pain has worsened, good knee is as bad as the bad knee  Has not been luisana to walk due to pain and breathing problems, not able to walk as far as he used to without O2  O2 helps quite a bit  Taste seems  to have back a little bit  Hair is falling out more and more, finasteride has not yet helped  Has not seen PCP to address thyroid function test  Constipation continues, going only once per week, then going a lot and stools are very hard, very painful at times  Taking stool softeners, not taking Miralax  Wife manages his medications, he has a hard time remembering  Memory continues to be poor, short-term memory more so, forgot his wife's name the other day, word finding difficulties  Sleep is still very poor, not more than 4 hours at a time  Not currently treating RICCI, does not tolerate CPAP, has lost 8lbs on Mounjaro so far  Oral appliance has not been explored  Blood sugars have been well controlled  Headaches are miserable, neurology recommends massage therapy but that is not working, resting helps  Heating pad helps his neck  Almost has pool open, plans to go in there, his problem is breathing  Completed Vitamin D supplement course    Relevant prior healthcare visits:  -06/2025 Pain Management for OA of both knees, not a surgical candidate, recommends exercise, refilled baclofen and Lyrica and oxycodone  -05/2025 Pulmonary continues symbicort for moderate asthma, check RAST, continue 3L O2 as needed, notes cardiac component to hypoxia and recommends to continue diuretics, stable GGOs, could not tolerate CPAP for severe RICCI and now hoping to lose weight on Mounjaro  -04/2025 Cardiology for Afib, HFpEF, CAC. Appears near compensated, continued current medications with extra Lasix as needed, add finasteride for hair loss and nocturia  -03/2025 Ortho knee injection  -02/2025 Endocrinology for DM2 management, increased mounjaro dose  -05/2024 Neurology recommends to try massage therapy for tension-type headache, MRI changes likely have nothing to do with headaches, MRI changes are stable, ? Neurosurgery consult, stop topiramate given side effects    Relevant prior diagnostic studies:  -04/2025 CTA chest shows no PE to  the proximal segmental level in light of suboptimal contrast bolus timing, mosaic attenuation of bilateral lungs compatible with small airway disease  -04/2025 MBSE normal  -10/2024 6MWT shows O2 yariel 93%, SBP dropped 20 points, walked 28% predicted with walker  -10/2024 PFTs shows moderately severe restriction  -05/2024 MRI brain shows mild degree of nonspecific white matter signal compatible with microangiopathy or sequela of migraine headaches, 2.5x1.5cm heterogeneous lesion within the suprasellar cistern corresponding to densely calcified lesion on prior CT 2010, given stability this is compatible with non aggressive lesion  -02/2024 CT chest shows mosaic ground-glass pattern in both lungs that is unchanged from 2022, moderate coronary calcifications  -10/2022 CT abdomen/pelvis shows no acute intra-abdominopelvic process  -10/2022 echocardiogram shows LVEF 55-60%, impaired relaxation pattern of LV diastolic filling    Relevant prior laboratory values, unremarkable unless noted:  -04/2025 Mag, vitamin D 20, CMP, CK, Bnp, CCP, iron, TSH elevated, ESR, RF, D-Dimer 1.03, CBC/D, Ferritin, Crp 12, ALMAZ, am cortisol, Vitamin B1, Troponin, tryptase,   -02/2025 HbA1c 8%  -08/2023 T4, C-peptide, TSH elevated, CBC/D, BNP, Mag, CMP    Exercise routine:  Diet:  Weight hx: pre-COVID-19 (Patient-Rptd) 275  lbs -> post-COVID-19 (Patient-Rptd) 307  lbs  Substance use: no tobacco use hx, 0 servings ETOH   Social:     Current Medications[1]    Medical History[2]    Surgical History[3]    Family History[4]    Objective   There were no vitals taken for this visit.    Physical Exam  Constitutional:       Comments: no acute distress, alert/conversational, appropriate affect, no focal neurological deficits noted via video appointment          Assessment/Plan   Problem List Items Addressed This Visit           ICD-10-CM       High    Long COVID - Primary U09.9    07/2025:  shortness of breath, poor sleep, chronic pain, brain fog,  "fatigue, headaches, smell and taste changes, rhinitis, dysphagia, constipation, numbness and tingling, mood changes, edema, dizziness, hair loss   -updated bloodwork orders placed: Vitamin D, Vitamin B12, Vitamin B6, Folate  -discuss elevated thyroid stimulation test with your endocrinologist at upcoming visit  -start Miralax one cap full per day  -discuss option for oral appliance with your dentist at upcoming visit  -follow up on prior referral to Occupational Therapist Klarissa Wu for cognitive rehabilitation and fatigue management, please call [phone] to set up a virtual or in-person appointment at [facility]   -[facility] walking     04/2025: shortness of breath, poor sleep, chronic pain, brain fog, fatigue, headaches, smell and taste changes, rhinitis, dysphagia, constipation, numbness and tingling, mood changes, edema, dizziness, hair loss  -comprehensive blood work, please have this drawn in the morning, fasting except for water   -continue close follow up with your specialists and their recommendations  -continue to work on optimizing your sleep apnea treatments as this is likely contributing to fatigue, brain fog, depression/anxiety, and headache.   -referral to Occupational Therapist Klarissa Wu for cognitive rehabilitation and fatigue management, please call [phone] to set up a virtual or in-person appointment at [facility] prescribed for constipation likely related to oxycodone  -swallow eval ordered  -smell retraining therapy for smell and taste changes, if no improvement we will refer to ENT   -use Flonase nose spray once daily to help with rhinitis and smell retraining therapy, prescription sent  -continue to use compression stockings during the day  -slowly increase activity as tolerated, we can consider PT referral in the future  -consider joining [facility] support group virtually  -additional recommendations provided under \"Patient Education\" section "   -> Vitamin D supplement, rule out PE, address TSH with PCP, anti-inflammatory measures         Relevant Orders    Vitamin D 25-Hydroxy,Total (for eval of Vitamin D levels)    Vitamin B12    Vitamin B6    Folate     Other Visit Diagnoses         Codes      Vitamin D deficiency     E55.9    Relevant Orders    Vitamin D 25-Hydroxy,Total (for eval of Vitamin D levels)                 [1]   Current Outpatient Medications:     albuterol 90 mcg/actuation inhaler, 2 puffs every 4 hours if needed for shortness of breath or wheezing (cough)., Disp: , Rfl:     baclofen (Lioresal) 10 mg tablet, Take 1 tablet (10 mg) by mouth 3 times a day., Disp: 90 tablet, Rfl: 2    blood-glucose sensor (FreeStyle Casandra 3 Sensor) device, Apply one sensor to the skin every 14 days to test blood sugars, Disp: 2 each, Rfl: 11    cephalexin (Keflex) 500 mg capsule, Take 1 capsule (500 mg) by mouth 2 times a day., Disp: 90 capsule, Rfl: 6    cholecalciferol (Vitamin D-3) 1.25 mg (50,000 units) tablet, Take 1.25 mg by mouth every 7 days., Disp: 12 tablet, Rfl: 0    empagliflozin (Jardiance) 25 mg tablet, Take 1 tablet (25 mg) by mouth once daily., Disp: 90 tablet, Rfl: 3    finasteride (Proscar) 5 mg tablet, Take 1 tablet (5 mg) by mouth once daily. Do not crush, chew, or split., Disp: 90 tablet, Rfl: 3    fluticasone (Flonase) 50 mcg/actuation nasal spray, ADMINISTER 1 SPRAY IN EACH NOSTRIL ONCE DAILY, Disp: 16 g, Rfl: 1    FreeStyle glucose monitoring (FreeStyle Lite Meter) kit, 1 each., Disp: , Rfl:     FreeStyle Casandra 3 Hardin misc, Use as instructed to check blood sugar, Disp: 1 each, Rfl: 0    FreeStyle Lite Strips strip, 4 times a day., Disp: , Rfl:     furosemide (Lasix) 20 mg tablet, Take 1 tablet (20 mg) by mouth 2 times a day as needed (swelling, short of breath, weight gain)., Disp: 180 tablet, Rfl: 1    furosemide (Lasix) 80 mg tablet, Take 1 tablet (80 mg) by mouth 2 times a day., Disp: 180 tablet, Rfl: 3    HumaLOG KwikPen Insulin  "100 unit/mL injection, Use as directed with meals up to 110 units a day disp 105 ml 90 day supply, Disp: 105 mL, Rfl: 3    insulin glargine (Toujeo Max U-300 SoloStar) 300 unit/mL (3 mL) injection, Up to 100 units daily, Take as directed per insulin instructions., Disp: 100 mL, Rfl: 3    metoprolol tartrate (Lopressor) 25 mg tablet, Take 1 tablet (25 mg) by mouth 2 times a day., Disp: 180 tablet, Rfl: 3    Mounjaro 5 mg/0.5 mL pen injector, Inject 5 mg under the skin every 7 days., Disp: 6 mL, Rfl: 1    naloxone (Narcan) 4 mg/0.1 mL nasal spray, Administer 1 spray (4 mg) into affected nostril(s) if needed for opioid reversal or respiratory depression., Disp: 2 each, Rfl: 0    nystatin (Mycostatin) cream, APPLY AND RUB IN A THIN LAYER TOPICALLY TO THE AFFECTED AREA TWICE DAILY IN THE MORNING AND IN THE EVENING, Disp: , Rfl:     oxyCODONE (Roxicodone) 10 mg immediate release tablet, Take 1 tablet (10 mg) by mouth 4 times a day as needed for moderate pain (4 - 6) for up to 28 days. Do not fill before June 27, 2025., Disp: 112 tablet, Rfl: 0    [START ON 7/25/2025] oxyCODONE (Roxicodone) 10 mg immediate release tablet, Take 1 tablet (10 mg) by mouth 4 times a day as needed for moderate pain (4 - 6) for up to 28 days. Do not fill before July 25, 2025., Disp: 112 tablet, Rfl: 0    [START ON 8/22/2025] oxyCODONE (Roxicodone) 10 mg immediate release tablet, Take 1 tablet (10 mg) by mouth every 6 hours if needed for moderate pain (4 - 6) for up to 28 days. Do not fill before August 22, 2025., Disp: 112 tablet, Rfl: 0    pen needle, diabetic (BD Ultra-Fine Short Pen Needle) 31 gauge x 5/16\" needle, Use as directed, Disp: , Rfl:     pen needle, diabetic 32 gauge x 5/32\" needle, Use 4 times per day, Disp: 400 each, Rfl: 3    pregabalin (Lyrica) 150 mg capsule, Take 1 capsule (150 mg) by mouth 3 times a day., Disp: 90 capsule, Rfl: 2    rosuvastatin (Crestor) 10 mg tablet, Take 1 tablet (10 mg) by mouth once daily., Disp: 90 " tablet, Rfl: 3    Symbicort 160-4.5 mcg/actuation inhaler, Inhale 2 puffs 2 times a day., Disp: 1 g, Rfl: 11    tirzepatide (Mounjaro) 10 mg/0.5 mL pen injector, Inject 10 mg under the skin 1 (one) time per week., Disp: 2 mL, Rfl: 5    tirzepatide (Mounjaro) 7.5 mg/0.5 mL pen injector, Inject 7.5 mg under the skin 1 (one) time per week., Disp: 2 mL, Rfl: 0    oxyCODONE (Roxicodone) 10 mg immediate release tablet, Take 1 tablet (10 mg) by mouth 4 times a day as needed for severe pain (7 - 10) for up to 28 days. Do not fill before May 23, 2025., Disp: 112 tablet, Rfl: 0  No current facility-administered medications for this visit.  [2]   Past Medical History:  Diagnosis Date    Cancer (Multi)     Candidiasis of skin and nail 10/01/2018    Cutaneous candidiasis    CHF (congestive heart failure)     Chronic pain disorder     Cluster headache     Contact with and (suspected) exposure to covid-19 09/28/2021    Close exposure to COVID-19 virus    Duodenitis without bleeding 02/23/2016    Duodenitis    Encounter for other preprocedural examination 02/25/2021    Preoperative clearance    Hyperlipidemia     Hypertension     Hypothyroidism     Infection and inflammatory reaction due to other internal joint prosthesis, initial encounter 01/28/2021    Infection of total knee replacement    Joint pain     Laceration without foreign body, right lower leg, subsequent encounter 06/11/2018    Leg laceration, right, subsequent encounter    Mixed hyperlipidemia     Other specified complication of vascular prosthetic devices, implants and grafts, initial encounter 12/22/2020    PICC line infiltration    Personal history of other diseases of the circulatory system 12/15/2022    History of atrial fibrillation    Personal history of other endocrine, nutritional and metabolic disease 04/30/2015    History of diabetes mellitus    Personal history of other malignant neoplasm of large intestine     History of malignant neoplasm of colon     Personal history of other malignant neoplasm of stomach     History of malignant neoplasm of stomach    Personal history of other specified conditions 10/01/2018    History of dysuria    Personal history of other specified conditions 04/30/2015    History of edema    Primary hypertension     Shingles     Type 2 diabetes mellitus with diabetic polyneuropathy     Type 2 diabetes mellitus with foot ulcer (CODE) 12/03/2021    Diabetic ulcer of toe of left foot associated with type 2 diabetes mellitus, limited to breakdown of skin    Type 2 diabetes mellitus with hyperglycemia, with long-term current use of insulin     Unspecified abdominal pain 07/07/2021    Abdominal cramping   [3]   Past Surgical History:  Procedure Laterality Date    JOINT REPLACEMENT      KNEE SURGERY  04/30/2015    Knee Surgery    ORTHOPEDIC SURGERY      OTHER SURGICAL HISTORY  04/30/2015    Arthrotomy Of Knee With Medial Meniscectomy    OTHER SURGICAL HISTORY  01/29/2021    Colon surgery    TOTAL KNEE ARTHROPLASTY  04/30/2015    Total Knee Arthroplasty   [4]   Family History  Problem Relation Name Age of Onset    Stroke Mother      Dementia Mother      Pneumonia Mother      Heart disease Father      Diabetes Father      Cancer Father      Arthritis Father      Skin cancer Father      Melanoma Father      Gout Father      Heart attack Father      Other (Cardiac disorder) Father      Alzheimer's disease Mother O

## 2025-06-27 NOTE — ASSESSMENT & PLAN NOTE
07/2025:  shortness of breath, poor sleep, chronic pain, brain fog, fatigue, headaches, smell and taste changes, rhinitis, dysphagia, constipation, numbness and tingling, mood changes, edema, dizziness, hair loss   -updated bloodwork orders placed: Vitamin D, Vitamin B12, Vitamin B6, Folate  -discuss elevated thyroid stimulation test with your endocrinologist at upcoming visit  -start Miralax one cap full per day  -discuss option for oral appliance with your dentist at upcoming visit  -follow up on prior referral to Occupational Therapist Klarissa Wu for cognitive rehabilitation and fatigue management, please call [phone] to set up a virtual or in-person appointment at [facility]   -[facility] walking     04/2025: shortness of breath, poor sleep, chronic pain, brain fog, fatigue, headaches, smell and taste changes, rhinitis, dysphagia, constipation, numbness and tingling, mood changes, edema, dizziness, hair loss  -comprehensive blood work, please have this drawn in the morning, fasting except for water   -continue close follow up with your specialists and their recommendations  -continue to work on optimizing your sleep apnea treatments as this is likely contributing to fatigue, brain fog, depression/anxiety, and headache.   -referral to Occupational Therapist Klarissa Wu for cognitive rehabilitation and fatigue management, please call [phone] to set [facility] likely related to oxycodone  -swallow eval ordered  -smell retraining therapy for smell and taste changes, if no improvement we will refer to ENT   -use Flonase nose spray once daily to help with rhinitis and smell retraining therapy, prescription sent  -continue to use compression stockings during the day  -slowly increase activity as tolerated, we can consider PT referral in the future  -consider joining [facility] Psychology support group  "virtually  -additional recommendations provided under \"Patient Education\" section   -> Vitamin D supplement, rule out PE, address TSH with PCP, anti-inflammatory measures  "

## 2025-07-02 ENCOUNTER — TELEPHONE (OUTPATIENT)
Dept: ORTHOPEDIC SURGERY | Facility: CLINIC | Age: 65
End: 2025-07-02
Payer: MEDICARE

## 2025-07-02 DIAGNOSIS — Z96.652 STATUS POST REVISION OF TOTAL REPLACEMENT OF LEFT KNEE: ICD-10-CM

## 2025-07-02 NOTE — TELEPHONE ENCOUNTER
Patients pharmacy called stating the patient asked for refills of his cephalexin (Keflex) 500 mg capsule, do you want to continue this medication?

## 2025-07-03 ENCOUNTER — APPOINTMENT (OUTPATIENT)
Dept: ORTHOPEDIC SURGERY | Facility: CLINIC | Age: 65
End: 2025-07-03
Payer: MEDICARE

## 2025-07-03 ENCOUNTER — TELEMEDICINE (OUTPATIENT)
Dept: OTHER | Facility: CLINIC | Age: 65
End: 2025-07-03
Payer: MEDICARE

## 2025-07-03 DIAGNOSIS — U09.9 LONG COVID: Primary | ICD-10-CM

## 2025-07-03 DIAGNOSIS — M25.561 ACUTE PAIN OF RIGHT KNEE: Primary | ICD-10-CM

## 2025-07-03 DIAGNOSIS — E55.9 VITAMIN D DEFICIENCY: ICD-10-CM

## 2025-07-03 PROCEDURE — 1159F MED LIST DOCD IN RCRD: CPT | Performed by: ORTHOPAEDIC SURGERY

## 2025-07-03 PROCEDURE — 1159F MED LIST DOCD IN RCRD: CPT | Performed by: NURSE PRACTITIONER

## 2025-07-03 PROCEDURE — 99214 OFFICE O/P EST MOD 30 MIN: CPT | Performed by: ORTHOPAEDIC SURGERY

## 2025-07-03 PROCEDURE — G2211 COMPLEX E/M VISIT ADD ON: HCPCS | Performed by: NURSE PRACTITIONER

## 2025-07-03 PROCEDURE — 20610 DRAIN/INJ JOINT/BURSA W/O US: CPT | Performed by: ORTHOPAEDIC SURGERY

## 2025-07-03 PROCEDURE — 1036F TOBACCO NON-USER: CPT | Performed by: ORTHOPAEDIC SURGERY

## 2025-07-03 PROCEDURE — 3052F HG A1C>EQUAL 8.0%<EQUAL 9.0%: CPT | Performed by: NURSE PRACTITIONER

## 2025-07-03 PROCEDURE — 99213 OFFICE O/P EST LOW 20 MIN: CPT | Performed by: NURSE PRACTITIONER

## 2025-07-03 PROCEDURE — 1160F RVW MEDS BY RX/DR IN RCRD: CPT | Performed by: ORTHOPAEDIC SURGERY

## 2025-07-03 PROCEDURE — 1160F RVW MEDS BY RX/DR IN RCRD: CPT | Performed by: NURSE PRACTITIONER

## 2025-07-03 PROCEDURE — 1125F AMNT PAIN NOTED PAIN PRSNT: CPT | Performed by: ORTHOPAEDIC SURGERY

## 2025-07-03 PROCEDURE — 1036F TOBACCO NON-USER: CPT | Performed by: NURSE PRACTITIONER

## 2025-07-03 PROCEDURE — 3052F HG A1C>EQUAL 8.0%<EQUAL 9.0%: CPT | Performed by: ORTHOPAEDIC SURGERY

## 2025-07-03 RX ORDER — TRIAMCINOLONE ACETONIDE 40 MG/ML
1 INJECTION, SUSPENSION INTRA-ARTICULAR; INTRAMUSCULAR
Status: COMPLETED | OUTPATIENT
Start: 2025-07-03 | End: 2025-07-03

## 2025-07-03 RX ORDER — CEPHALEXIN 500 MG/1
500 CAPSULE ORAL 2 TIMES DAILY
Qty: 90 CAPSULE | Refills: 6 | Status: SHIPPED | OUTPATIENT
Start: 2025-07-03

## 2025-07-03 RX ADMIN — TRIAMCINOLONE ACETONIDE 1 ML: 40 INJECTION, SUSPENSION INTRA-ARTICULAR; INTRAMUSCULAR at 08:45

## 2025-07-03 ASSESSMENT — PAIN SCALES - GENERAL: PAINLEVEL_OUTOF10: 7

## 2025-07-03 ASSESSMENT — PAIN - FUNCTIONAL ASSESSMENT: PAIN_FUNCTIONAL_ASSESSMENT: 0-10

## 2025-07-03 NOTE — PROGRESS NOTES
This is a consultation from Dr. Ivonne Valerio, GEOVANNI-CNP for   Chief Complaint   Patient presents with    Right Knee - Pain       This is a 64 y.o. male who presents for follow-up for his right knee.  Patient has right knee arthritis, he had cortisone injection about 3 months ago.  Still helping him, he gets relief for about 6 to 8 weeks.  Since then has had return of his symptoms estimations pain, sharp pain over the medial knee worse with walking with rest.  He is often using a walker now to help him.    Physical Exam    There has been no interval change in this patient's past medical, surgical, medications, allergies, family history or social history since the most recent visit to a provider within our department. 14 point review of systems was performed, reviewed, and negative except for pertinent positives documented in the history of present illness.     Constitutional: well developed, well nourished male in no acute distress  Psychiatric: normal mood, appropriate affect  Eyes: sclera anicteric  HENT: normocephalic/atraumatic  CV: regular rate and rhythm   Respiratory: non labored breathing  Integumentary: no rash  Neurological: moves all extremities    Right knee exam: skin intact no lacerations or abrations. no effusion.  Tender medial joint line. negative log roll negative patellar grind. ROM 0-120. stable to varus and valgus stress at 0 and 30 degrees. negative lachman negative posterior drawer negative vianca. 5/5 ehl/fhl/gs/ta. silt s/s/sp/dp/t. 2+ dp/pt        Imaging:  Xrays were ordered by me, they were reviewed and independently interpreted by me today, they show severe right knee arthritis    L Inj/Asp: R knee on 7/3/2025 8:45 AM  Indications: pain and joint swelling  Details: 22 G needle, anterolateral approach  Medications: 1 mL triamcinolone acetonide 40 mg/mL    Discussion:  I discussed the conservative treatment options for knee osteoarthritis including but not limited to physical therapy,  oral NSAIDS, activity and lifestyle modification, and corticosteroid injections. Pt has elected to undergo a cortisone injection today. I have explained the risk and benefits of an injection including the possibility of joint infection, bleeding, damage to cartilage, allergic reaction. Patient verbalized understanding and gave verbal consent wishes to proceed with a intra-articular cortisone injection for their knee.    Procedure:  After discussing the risk and benefits of the procedure, we proceeded with an intra-articular right knee injection. We discussed the risks and benefits and potential morbidity related to the treatment, and to the prescription medication administered in the injection    With the patient's informed verbal consent, the right knee was prepped in standard sterile fashion with Chlorhexidine. The skin was then anesthetized with ethyl chloride spray and cleaned again with Chlorhexidine. The knee was then apirated/injected with a prefilled 20-gauge syringe of 40 mg Kenalog + 4 ml Lidocaine using the lateral approach without complications.  The patient tolerated this well and felt immediate initial relief of symptoms. A bandaid was applied and the patient ambulated out of the clinic on ther own accord without difficulty. Patient was instructed to avoid physical activity for 24-48 hours to prevent the knees from swelling and may ice the knees as tolerated. Patient should contact the office if any signs of of infection appear: redness, fever, chills, drainage, swelling or warmth to the knees.  Pt understands that the injections can be repeated no sooner than 3 months.    Procedure, treatment alternatives, risks and benefits explained, specific risks discussed. Consent was given by the patient. Immediately prior to procedure a time out was called to verify the correct patient, procedure, equipment, support staff and site/side marked as required. Patient was prepped and draped in the usual sterile  "fashion.             Impression/Plan: This is a 64 y.o. male with right knee arthritis.  I had an in depth discussion with the patient regarding treatment options for arthritis and their relative risks and benefits. We reviewed surgical and nonsurgical option for treatment. Treatments include anti inflammatory medications, physical therapy, weight loss, activity modification, use of assistive devices, injection therapies. We discussed current prescriptions and risks and benefits of continuation of prescription medication as apporpriate. We discussed that arthritis is often progressive over time, an in end stage arthritis surgical interventions can be considered, including arthroplasty. All questions were answered and the patient voiced their understanding.  Continue injections we will see him back as needed    BMI Readings from Last 1 Encounters:   05/21/25 46.35 kg/m²      Lab Results   Component Value Date    CREATININE 1.16 04/10/2025     Tobacco Use: Low Risk  (7/3/2025)    Patient History     Smoking Tobacco Use: Never     Smokeless Tobacco Use: Never     Passive Exposure: Never      Computed MELD 3.0 unavailable. One or more values for this score either were not found within the given timeframe or did not fit some other criterion.  Computed MELD-Na unavailable. One or more values for this score either were not found within the given timeframe or did not fit some other criterion.       Lab Results   Component Value Date    HGBA1C 8.0 (A) 02/18/2025     No results found for: \"STAPHMRSASCR\"  "

## 2025-07-03 NOTE — PATIENT INSTRUCTIONS
It was my pleasure seeing you in [facility] today.  We will focus on addressing the following symptoms discussed today: shortness of breath, poor sleep, chronic pain, brain fog, fatigue, headaches, smell and taste changes, rhinitis, dysphagia, constipation, numbness and tingling, mood changes, edema, dizziness, hair loss     My recommendations are as follows:  -updated bloodwork orders placed: Vitamin D, Vitamin B12, Vitamin B6, Folate  -discuss elevated thyroid stimulation test with your endocrinologist at upcoming visit  -start Miralax one cap full per day  -discuss option for oral appliance with your dentist at upcoming visit  -follow up on prior referral to Occupational Therapist Klarissa Wu for cognitive rehabilitation and fatigue management, please call [phone] to set up a virtual or in-person appointment at [facility]   -[place] pool walking     Tips to help improve brain fog and fatigue:  --avoid drinking Alcohol while 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 Aim to eat 30 different plants per week (vegetables, fruits, beans, nuts, legumes, seeds, whole grains, herbs, spices). Avoid processed foods and beverages. Eliminate added sugars, artificial sweeteners, processed oils, artificial dyes.  --ensure to practice 30 minutes of exercise 7 days per week to keep BNDGF (brain derived neurotrophic growth factor) elevated as this will help in the regeneration of neurons, you may split exercise time up into 5 minute increments if this is better tolerated.   --slowly increase your activity by no more than 10% per week, rest when you feel tired  --utilize pacing techniques to manage fatigue, schedule rest times throughout the day so you do not run out of energy, more information to be found on this here: http://[email or web address].ca/health-info-site/Documents/post_covid-19_fatigue.pdf  --use the “attention beam” strategy to help you focus on  "some things while ignoring others. Imagine a flashlight beam illuminating the task that you are trying to focus on while leaving everything else in the dark.  --minimize external distractions to help with attention. For example, turn off the TV, radio, music, heater, and other electrical equipment, and close the window to screen out traffic noise. Complete important or difficult tasks in a quiet room if possible. Switch off mobile phones, switch off automatic email notifications. Use ear plugs if necessary or noise-cancelling headphones. Reduce visual distractions by clearing off your work-space, sit opposite to a window. Make sure lighting in the workspace is adequate.  --minimize internal distractions to help with attention. Thoughts, feelings, and physical sensations such as hunger or pain can be distracting. When you notice your attention beam is directed toward a thought or sensation, gently direct your attention back to the central focal point. You can also practice mindfulness and/or meditation to help reduce internal distractions  --games that can be tried to help improve memory and attention are [employer] HQ and N-Back games  --mindfulness and meditation can be learned with the smartphone apps “Unwinding Anxiety” and “Headspace”  --Review the  Health Talk on Managing Fatigue and Thinking Changes after COVID-19 here: https://[email or web address]/Health-Talks/articles/2022/05/managing-fatigue-and-thinking-changes-after-covid-19  --You can also find many helpful tips and tricks in this book: \"The Long COVID self-help guide, practical ways to manage symptoms\" by [employer] [facility]  --additional apps, books, and podcasts for patients suffering from Long COVID can be found at [email or web address]/public-health/long-covid/long-covid-apps-books-podcasts/     To help improve sleep:  --try Melatonin children's liquid drops 1-2mg underneath your tongue 30 minutes before " you go to bed  --go to bed at the same time each night and get up at the same time each morning, including the weekend  --goal of 7-9 hours of uninterrupted sleep per night  --make sure your bedroom is quiet, dark, relaxing, and at a comfortable temperature  --remove electronic devices, such as TVs, computers, and smart phones, from your bedroom  --avoid caffeine after the morning and large meals before bed  --drink plenty of water throughout the day but avoid drinking fluids two hours before bed  --expose yourself to bright light in the morning  --engage in a calming bed-time routine of warm bath/shower, gratitude journal, guided meditation, etc.  --do not lay awake in bed for more than 20 minutes, get up and engage in a soothing activity such as reading a boring book until you feel sleepy     General headache recommendations:  -avoid common triggers which include red wine, dark beer, aged cheese, nuts, onions, chocolate, aspartame, processed meats and nitrates, excessive caffeine, caffeine withdrawal, fasting, skipping meals, barometric pressure change, bright light, poor air quality, odors  -avoid overuse of OTC medications such as Ibuprofen, Naproxen, Excedrin, etc. as this can lead to rebound headaches  -maintain a stable and consistent sleep schedule, even on weekends  -stay well-hydrated with non-caffeinated beverages  -avoid skipping r delaying meals  -aim for 30 minutes of movement per day, find something you enjoy so it doesn't feel like a chore  -Mind-Body and Stress Managements tools include acupuncture, chiropractic care, yoga, meditation, psychotherapy     We will send a message in [employer] or call you with the results of your tests.  Further recommendations will follow based on testing results and your symptoms.   Please return to [facility] in 3 months, call [phone] or send a message through your [employer] kapil if needed.    If you are interested in joining a clinical trial, look into the  following resources:  https://clinicaltrials.gov/  [email or web address]  [email or web address]

## 2025-07-28 ENCOUNTER — PATIENT MESSAGE (OUTPATIENT)
Dept: OTHER | Facility: CLINIC | Age: 65
End: 2025-07-28
Payer: MEDICARE

## 2025-07-28 DIAGNOSIS — R43.0 ANOSMIA: ICD-10-CM

## 2025-07-28 DIAGNOSIS — R20.2 NUMBNESS AND TINGLING: ICD-10-CM

## 2025-07-28 DIAGNOSIS — U09.9 LONG COVID: Primary | ICD-10-CM

## 2025-07-28 DIAGNOSIS — R41.89 COGNITIVE CHANGES: ICD-10-CM

## 2025-07-28 DIAGNOSIS — R51.9 NONINTRACTABLE HEADACHE, UNSPECIFIED CHRONICITY PATTERN, UNSPECIFIED HEADACHE TYPE: ICD-10-CM

## 2025-07-28 DIAGNOSIS — R79.9 ABNORMAL BLOOD CELL COUNT: ICD-10-CM

## 2025-07-28 DIAGNOSIS — R20.0 NUMBNESS AND TINGLING: ICD-10-CM

## 2025-07-28 DIAGNOSIS — R53.83 FATIGUE, UNSPECIFIED TYPE: ICD-10-CM

## 2025-07-29 LAB — 25(OH)D3+25(OH)D2 SERPL-MCNC: 56 NG/ML (ref 30–100)

## 2025-08-01 DIAGNOSIS — I10 PRIMARY HYPERTENSION: ICD-10-CM

## 2025-08-01 RX ORDER — METOPROLOL TARTRATE 25 MG/1
25 TABLET, FILM COATED ORAL 2 TIMES DAILY
Qty: 180 TABLET | Refills: 3 | Status: SHIPPED | OUTPATIENT
Start: 2025-08-01

## 2025-08-06 LAB
FOLATE SERPL-MCNC: 5.1 NG/ML
PYRIDOXAL PHOS SERPL-MCNC: NORMAL UG/L
VIT B12 SERPL-MCNC: 187 PG/ML (ref 200–1100)

## 2025-08-08 ENCOUNTER — TELEPHONE (OUTPATIENT)
Facility: CLINIC | Age: 65
End: 2025-08-08
Payer: MEDICARE

## 2025-08-12 ENCOUNTER — APPOINTMENT (OUTPATIENT)
Dept: ENDOCRINOLOGY | Facility: CLINIC | Age: 65
End: 2025-08-12
Payer: MEDICARE

## 2025-08-12 VITALS
DIASTOLIC BLOOD PRESSURE: 64 MMHG | WEIGHT: 311 LBS | SYSTOLIC BLOOD PRESSURE: 110 MMHG | BODY MASS INDEX: 45.93 KG/M2 | HEART RATE: 67 BPM

## 2025-08-12 DIAGNOSIS — I10 PRIMARY HYPERTENSION: ICD-10-CM

## 2025-08-12 DIAGNOSIS — Z79.4 TYPE 2 DIABETES MELLITUS WITH HYPERGLYCEMIA, WITH LONG-TERM CURRENT USE OF INSULIN: Primary | ICD-10-CM

## 2025-08-12 DIAGNOSIS — E78.2 MIXED HYPERLIPIDEMIA: ICD-10-CM

## 2025-08-12 DIAGNOSIS — E11.65 TYPE 2 DIABETES MELLITUS WITH HYPERGLYCEMIA, WITH LONG-TERM CURRENT USE OF INSULIN: Primary | ICD-10-CM

## 2025-08-12 LAB
FOLATE SERPL-MCNC: 5.1 NG/ML
POC HEMOGLOBIN A1C: 7.5 % (ref 4.2–6.5)
PYRIDOXAL PHOS SERPL-MCNC: 9.1 NG/ML (ref 2.1–21.7)
VIT B12 SERPL-MCNC: 187 PG/ML (ref 200–1100)

## 2025-08-12 PROCEDURE — 83036 HEMOGLOBIN GLYCOSYLATED A1C: CPT | Performed by: PHARMACIST

## 2025-08-12 PROCEDURE — 99214 OFFICE O/P EST MOD 30 MIN: CPT | Performed by: PHARMACIST

## 2025-08-12 ASSESSMENT — PATIENT HEALTH QUESTIONNAIRE - PHQ9
2. FEELING DOWN, DEPRESSED OR HOPELESS: NOT AT ALL
2. FEELING DOWN, DEPRESSED OR HOPELESS: SEVERAL DAYS
1. LITTLE INTEREST OR PLEASURE IN DOING THINGS: NOT AT ALL
SUM OF ALL RESPONSES TO PHQ9 QUESTIONS 1 & 2: 0

## 2025-08-12 ASSESSMENT — ENCOUNTER SYMPTOMS
OCCASIONAL FEELINGS OF UNSTEADINESS: 1
LOSS OF SENSATION IN FEET: 1
DEPRESSION: 1

## 2025-08-12 ASSESSMENT — PAIN SCALES - GENERAL: PAINLEVEL_OUTOF10: 8

## 2025-09-10 ENCOUNTER — APPOINTMENT (OUTPATIENT)
Dept: PRIMARY CARE | Facility: CLINIC | Age: 65
End: 2025-09-10
Payer: MEDICARE

## 2026-02-18 ENCOUNTER — APPOINTMENT (OUTPATIENT)
Facility: CLINIC | Age: 66
End: 2026-02-18
Payer: MEDICARE